# Patient Record
Sex: MALE | Race: WHITE
[De-identification: names, ages, dates, MRNs, and addresses within clinical notes are randomized per-mention and may not be internally consistent; named-entity substitution may affect disease eponyms.]

---

## 2017-03-06 ENCOUNTER — MYAURORA ACCOUNT LINK (OUTPATIENT)
Dept: OTHER | Age: 77
End: 2017-03-06

## 2017-03-06 ENCOUNTER — PRIOR ORIGINAL RECORDS (OUTPATIENT)
Dept: OTHER | Age: 77
End: 2017-03-06

## 2017-05-31 ENCOUNTER — PRIOR ORIGINAL RECORDS (OUTPATIENT)
Dept: OTHER | Age: 77
End: 2017-05-31

## 2017-06-29 ENCOUNTER — PRIOR ORIGINAL RECORDS (OUTPATIENT)
Dept: OTHER | Age: 77
End: 2017-06-29

## 2017-06-30 ENCOUNTER — LAB ENCOUNTER (OUTPATIENT)
Dept: LAB | Facility: HOSPITAL | Age: 77
End: 2017-06-30
Attending: INTERNAL MEDICINE
Payer: MEDICARE

## 2017-06-30 ENCOUNTER — PRIOR ORIGINAL RECORDS (OUTPATIENT)
Dept: OTHER | Age: 77
End: 2017-06-30

## 2017-06-30 DIAGNOSIS — E78.2 MIXED HYPERLIPIDEMIA: Primary | ICD-10-CM

## 2017-06-30 LAB
ALBUMIN SERPL BCP-MCNC: 4.2 G/DL (ref 3.5–4.8)
ALBUMIN/GLOB SERPL: 1.6 {RATIO} (ref 1–2)
ALP SERPL-CCNC: 56 U/L (ref 32–100)
ALT SERPL-CCNC: 20 U/L (ref 17–63)
ANION GAP SERPL CALC-SCNC: 8 MMOL/L (ref 0–18)
AST SERPL-CCNC: 24 U/L (ref 15–41)
BASOPHILS # BLD: 0 K/UL (ref 0–0.2)
BASOPHILS NFR BLD: 1 %
BILIRUB SERPL-MCNC: 1 MG/DL (ref 0.3–1.2)
BUN SERPL-MCNC: 35 MG/DL (ref 8–20)
BUN/CREAT SERPL: 26.5 (ref 10–20)
CALCIUM SERPL-MCNC: 9.6 MG/DL (ref 8.5–10.5)
CHLORIDE SERPL-SCNC: 107 MMOL/L (ref 95–110)
CHOLEST SERPL-MCNC: 191 MG/DL (ref 110–200)
CO2 SERPL-SCNC: 26 MMOL/L (ref 22–32)
CREAT SERPL-MCNC: 1.32 MG/DL (ref 0.5–1.5)
EOSINOPHIL # BLD: 0.1 K/UL (ref 0–0.7)
EOSINOPHIL NFR BLD: 3 %
ERYTHROCYTE [DISTWIDTH] IN BLOOD BY AUTOMATED COUNT: 13 % (ref 11–15)
GLOBULIN PLAS-MCNC: 2.7 G/DL (ref 2.5–3.7)
GLUCOSE SERPL-MCNC: 105 MG/DL (ref 70–99)
HCT VFR BLD AUTO: 41 % (ref 41–52)
HDLC SERPL-MCNC: 87 MG/DL
HGB BLD-MCNC: 14 G/DL (ref 13.5–17.5)
LDLC SERPL CALC-MCNC: 94 MG/DL (ref 0–99)
LYMPHOCYTES # BLD: 0.6 K/UL (ref 1–4)
LYMPHOCYTES NFR BLD: 16 %
MCH RBC QN AUTO: 33.3 PG (ref 27–32)
MCHC RBC AUTO-ENTMCNC: 34.1 G/DL (ref 32–37)
MCV RBC AUTO: 97.8 FL (ref 80–100)
MONOCYTES # BLD: 0.5 K/UL (ref 0–1)
MONOCYTES NFR BLD: 14 %
NEUTROPHILS # BLD AUTO: 2.5 K/UL (ref 1.8–7.7)
NEUTROPHILS NFR BLD: 66 %
NONHDLC SERPL-MCNC: 104 MG/DL
OSMOLALITY UR CALC.SUM OF ELEC: 300 MOSM/KG (ref 275–295)
PLATELET # BLD AUTO: 159 K/UL (ref 140–400)
PMV BLD AUTO: 9.2 FL (ref 7.4–10.3)
POTASSIUM SERPL-SCNC: 4.9 MMOL/L (ref 3.3–5.1)
PROT SERPL-MCNC: 6.9 G/DL (ref 5.9–8.4)
RBC # BLD AUTO: 4.2 M/UL (ref 4.5–5.9)
SODIUM SERPL-SCNC: 141 MMOL/L (ref 136–144)
TRIGL SERPL-MCNC: 52 MG/DL (ref 1–149)
WBC # BLD AUTO: 3.9 K/UL (ref 4–11)

## 2017-06-30 PROCEDURE — 85025 COMPLETE CBC W/AUTO DIFF WBC: CPT

## 2017-06-30 PROCEDURE — 80053 COMPREHEN METABOLIC PANEL: CPT

## 2017-06-30 PROCEDURE — 36415 COLL VENOUS BLD VENIPUNCTURE: CPT

## 2017-06-30 PROCEDURE — 80061 LIPID PANEL: CPT

## 2017-07-07 LAB
ALBUMIN: 4.2 G/DL
ALKALINE PHOSPHATATE(ALK PHOS): 56 IU/L
ALT (SGPT): 20 U/L
AST (SGOT): 24 U/L
BILIRUBIN TOTAL: 1 MG/DL
BUN: 35 MG/DL
CALCIUM: 9.6 MG/DL
CHLORIDE: 107 MEQ/L
CHOLESTEROL, TOTAL: 191 MG/DL
CREATININE, SERUM: 1.32 MG/DL
GLOBULIN: 2.7 G/DL
GLUCOSE: 105 MG/DL
GLUCOSE: 105 MG/DL
HDL CHOLESTEROL: 87 MG/DL
HEMATOCRIT: 41 %
HEMOGLOBIN: 14 G/DL
LDL CHOLESTEROL: 94 MG/DL
PLATELETS: 159 K/UL
POTASSIUM, SERUM: 4.9 MEQ/L
PROTEIN, TOTAL: 6.9 G/DL
RED BLOOD COUNT: 4.2 X 10-6/U
SGOT (AST): 24 IU/L
SGPT (ALT): 20 IU/L
SODIUM: 141 MEQ/L
TRIGLYCERIDES: 52 MG/DL
WHITE BLOOD COUNT: 3.9 X 10-3/U

## 2017-10-02 ENCOUNTER — OFFICE VISIT (OUTPATIENT)
Dept: OPTOMETRY | Facility: CLINIC | Age: 77
End: 2017-10-02

## 2017-10-02 DIAGNOSIS — H52.4 MYOPIA WITH PRESBYOPIA OF BOTH EYES: Primary | ICD-10-CM

## 2017-10-02 DIAGNOSIS — H52.13 MYOPIA WITH PRESBYOPIA OF BOTH EYES: Primary | ICD-10-CM

## 2017-10-02 PROCEDURE — 99211 OFF/OP EST MAY X REQ PHY/QHP: CPT | Performed by: OPTOMETRIST

## 2017-10-02 PROCEDURE — 92015 DETERMINE REFRACTIVE STATE: CPT | Performed by: OPTOMETRIST

## 2017-10-02 RX ORDER — APIXABAN 5 MG/1
5 TABLET, FILM COATED ORAL 2 TIMES DAILY
Refills: 5 | COMMUNITY
Start: 2017-09-17

## 2017-10-02 NOTE — ASSESSMENT & PLAN NOTE
New glasses RX given to update as needed. Patient will see TERRY for his annual eye exam in December.  He wants to get new glasses before his DMV test.

## 2017-10-02 NOTE — PATIENT INSTRUCTIONS
Myopia with presbyopia of both eyes  New glasses RX given to update as needed. Patient will see TERRY for his annual eye exam in December.  He wants to get new glasses before his DMV test.

## 2017-10-02 NOTE — PROGRESS NOTES
Lazaro Berger is a 69year old male. HPI:     HPI     Patient is in for a refraction only. He is due to see RJM  In  for his annual eye exam but wants to make sure he can pass his driving test and if not wants new glasses.  Patient gets tired re by mouth. Disp:  Rfl:    tamsulosin HCl (FLOMAX) 0.4 MG Oral Cap Take 0.4 mg by mouth daily. Disp:  Rfl:    aspirin (ASPIR-81) 81 MG Oral Tab EC Take 81 mg by mouth daily.    Disp:  Rfl:        Allergies:    Meperidine Hcl  [De*    Swelling  Penicillin G

## 2017-10-02 NOTE — PROGRESS NOTES
Jess Wolfe is a 69year old male. HPI:     HPI     Patient is in for a refraction only. He is due to see RJM  In  for his annual eye exam but wants to make sure he can pass his driving test and if not wants new glasses.  Patient gets tired re by mouth. Disp:  Rfl:    tamsulosin HCl (FLOMAX) 0.4 MG Oral Cap Take 0.4 mg by mouth daily. Disp:  Rfl:    aspirin (ASPIR-81) 81 MG Oral Tab EC Take 81 mg by mouth daily.    Disp:  Rfl:        Allergies:    Meperidine Hcl  [De*    Swelling  Penicillin G

## 2017-10-03 RX ORDER — HYDROCHLOROTHIAZIDE 25 MG/1
TABLET ORAL
Qty: 90 TABLET | Refills: 0 | Status: SHIPPED | OUTPATIENT
Start: 2017-10-03 | End: 2017-11-01

## 2017-10-03 RX ORDER — LISINOPRIL 10 MG/1
TABLET ORAL
Qty: 90 TABLET | Refills: 0 | Status: SHIPPED | OUTPATIENT
Start: 2017-10-03 | End: 2017-11-01 | Stop reason: DRUGHIGH

## 2017-10-03 RX ORDER — ATORVASTATIN CALCIUM 20 MG/1
TABLET, FILM COATED ORAL
Qty: 90 TABLET | Refills: 0 | Status: SHIPPED | OUTPATIENT
Start: 2017-10-03 | End: 2017-11-01

## 2017-10-31 PROBLEM — N18.30 CHRONIC KIDNEY DISEASE, STAGE 3 (HCC): Status: ACTIVE | Noted: 2017-10-31

## 2017-10-31 PROBLEM — I70.0 AORTIC ATHEROSCLEROSIS (HCC): Status: ACTIVE | Noted: 2017-10-31

## 2017-10-31 PROBLEM — I70.0 AORTIC ATHEROSCLEROSIS: Status: ACTIVE | Noted: 2017-10-31

## 2017-11-01 ENCOUNTER — OFFICE VISIT (OUTPATIENT)
Dept: INTERNAL MEDICINE CLINIC | Facility: CLINIC | Age: 77
End: 2017-11-01

## 2017-11-01 VITALS
TEMPERATURE: 99 F | SYSTOLIC BLOOD PRESSURE: 146 MMHG | HEIGHT: 69 IN | BODY MASS INDEX: 31.31 KG/M2 | DIASTOLIC BLOOD PRESSURE: 72 MMHG | WEIGHT: 211.38 LBS | HEART RATE: 96 BPM

## 2017-11-01 DIAGNOSIS — Z00.00 ENCOUNTER FOR ANNUAL HEALTH EXAMINATION: ICD-10-CM

## 2017-11-01 DIAGNOSIS — E78.00 HYPERCHOLESTEROLEMIA: ICD-10-CM

## 2017-11-01 DIAGNOSIS — N40.0 BENIGN PROSTATIC HYPERPLASIA WITHOUT LOWER URINARY TRACT SYMPTOMS: ICD-10-CM

## 2017-11-01 DIAGNOSIS — N18.30 CHRONIC KIDNEY DISEASE, STAGE 3 (HCC): ICD-10-CM

## 2017-11-01 DIAGNOSIS — I25.10 ASHD (ARTERIOSCLEROTIC HEART DISEASE): ICD-10-CM

## 2017-11-01 DIAGNOSIS — I70.0 AORTIC ATHEROSCLEROSIS (HCC): ICD-10-CM

## 2017-11-01 DIAGNOSIS — Z00.00 ANNUAL PHYSICAL EXAM: Primary | ICD-10-CM

## 2017-11-01 DIAGNOSIS — I10 ESSENTIAL HYPERTENSION: ICD-10-CM

## 2017-11-01 DIAGNOSIS — I44.1 SECOND DEGREE AV BLOCK: ICD-10-CM

## 2017-11-01 DIAGNOSIS — I48.0 PAROXYSMAL ATRIAL FIBRILLATION (HCC): ICD-10-CM

## 2017-11-01 PROBLEM — H52.4 MYOPIA WITH PRESBYOPIA OF BOTH EYES: Status: RESOLVED | Noted: 2017-10-02 | Resolved: 2017-11-01

## 2017-11-01 PROBLEM — H52.13 MYOPIA WITH PRESBYOPIA OF BOTH EYES: Status: RESOLVED | Noted: 2017-10-02 | Resolved: 2017-11-01

## 2017-11-01 PROCEDURE — G0009 ADMIN PNEUMOCOCCAL VACCINE: HCPCS | Performed by: INTERNAL MEDICINE

## 2017-11-01 PROCEDURE — 90732 PPSV23 VACC 2 YRS+ SUBQ/IM: CPT | Performed by: INTERNAL MEDICINE

## 2017-11-01 PROCEDURE — G0439 PPPS, SUBSEQ VISIT: HCPCS | Performed by: INTERNAL MEDICINE

## 2017-11-01 RX ORDER — LISINOPRIL 10 MG/1
10 TABLET ORAL
Qty: 90 TABLET | Refills: 2 | Status: CANCELLED | OUTPATIENT
Start: 2017-11-01

## 2017-11-01 RX ORDER — ATORVASTATIN CALCIUM 20 MG/1
20 TABLET, FILM COATED ORAL
Qty: 90 TABLET | Refills: 3 | Status: SHIPPED | OUTPATIENT
Start: 2017-11-01 | End: 2018-11-17

## 2017-11-01 RX ORDER — HYDROCHLOROTHIAZIDE 25 MG/1
25 TABLET ORAL
Qty: 90 TABLET | Refills: 3 | Status: SHIPPED | OUTPATIENT
Start: 2017-11-01 | End: 2018-07-16

## 2017-11-01 RX ORDER — LISINOPRIL 20 MG/1
20 TABLET ORAL DAILY
Qty: 90 TABLET | Refills: 3 | Status: SHIPPED | OUTPATIENT
Start: 2017-11-01 | End: 2018-11-17

## 2017-11-01 NOTE — PATIENT INSTRUCTIONS
Please increase lisinopril to 20 mg daily. Please continue your other medications. You received a Pneumovax today. Please get a high-dose flu shot soon, and a Zostavax soon as well.   Continue to eat healthy and exercise regularly, with hopefully some we screening covered service except at the Welcome to Medicare Visit    Abdominal aortic aneurysm screening (once between ages 73-68)  No results found for this or any previous visit.  Limited to patients who meet one of the following criteria:   • Men who are (PNEUMOVAX)    Please get once after your 65th birthday    Hepatitis B for Moderate/High Risk No orders found for this or any previous visit.  Medium/high risk factors:   End-stage renal disease   Hemophiliacs who received Factor VIII or IX concentrates   C

## 2017-11-01 NOTE — PROGRESS NOTES
HPI:   Jorge Mccullough is a 69year old male who presents this afternoon for a Medicare Subsequent Annual Wellness visit (Pt already had Initial Annual Wellness). He feels well today, and he has no specific issues for discussion.   He continues to follow Results  Component Value Date   WBC 3.9 (L) 06/30/2017   HGB 14.0 06/30/2017    06/30/2017        ALLERGIES:   He is allergic to meperidine hcl  [demerol] and penicillin g.     CURRENT MEDICATIONS:     Outpatient Prescriptions Marked as Taking for th or rectal bleeding  : Chronic urinary frequency which he attributes to fluid intake.   No dysuria or hematuria  MUSCULOSKELETAL: No leg swelling  NEURO: No headaches      EXAM:   /72   Pulse 96   Temp 98.7 °F (37.1 °C) (Oral)   Ht 5' 9\" (1.753 m) 20/40   Both Eyes Visual Acuity: Corrected Both Eyes Chart Acuity: 20/30   Able To Tolerate Visual Acuity: Yes        EXAM:   GENERAL: Pleasant male appearing well in no distress  /72   Pulse 96   Temp 98.7 °F (37.1 °C) (Oral)   Ht 5' 9\" (1.753 m) weight loss. Return visit in 1 month for blood pressure check.     ASHD (arteriosclerotic heart disease)  Asymptomatic, with regular Cardiology follow-up    Paroxysmal atrial fibrillation (HCC)  On Eliquis, with Cardiology follow-up    Second degree AV blo current health state?: Good    How do you maintain positive mental well-being?: Social Interaction; Visiting Friends; Visiting Family    If you are a male age 38-65 or a female age 47-67, do you take aspirin?: No    Have you had any immunizations at another SERVICES  INDICATIONS AND SCHEDULE Internal Lab or Procedure External Lab or Procedure   Diabetes Screening      HbgA1C   Annually GLYCOHEMOGLOBIN (HgA1c) (L) (%)   Date Value   05/07/2013 5.6       No flowsheet data found.     Fasting Blood Sugar (FSB)Neisha digoxin diuretics, anticonvulsants.)    Potassium  Annually Potassium (mmol/L)   Date Value   06/30/2017 4.9     POTASSIUM (P) (mmol/L)   Date Value   09/11/2015 4.5    No flowsheet data found.     Creatinine  Annually Creatinine (mg/dL)   Date Value   06/3

## 2017-11-05 ENCOUNTER — LAB ENCOUNTER (OUTPATIENT)
Dept: LAB | Facility: HOSPITAL | Age: 77
End: 2017-11-05
Attending: UROLOGY
Payer: MEDICARE

## 2017-11-05 DIAGNOSIS — R97.20 ELEVATED PROSTATE SPECIFIC ANTIGEN (PSA): Primary | ICD-10-CM

## 2017-11-05 PROCEDURE — 84153 ASSAY OF PSA TOTAL: CPT

## 2017-11-05 PROCEDURE — 36415 COLL VENOUS BLD VENIPUNCTURE: CPT

## 2017-12-01 ENCOUNTER — OFFICE VISIT (OUTPATIENT)
Dept: INTERNAL MEDICINE CLINIC | Facility: CLINIC | Age: 77
End: 2017-12-01

## 2017-12-01 VITALS
DIASTOLIC BLOOD PRESSURE: 58 MMHG | BODY MASS INDEX: 31.7 KG/M2 | HEIGHT: 69 IN | SYSTOLIC BLOOD PRESSURE: 138 MMHG | HEART RATE: 66 BPM | WEIGHT: 214 LBS

## 2017-12-01 DIAGNOSIS — I10 ESSENTIAL HYPERTENSION: Primary | ICD-10-CM

## 2017-12-01 PROCEDURE — G0463 HOSPITAL OUTPT CLINIC VISIT: HCPCS | Performed by: INTERNAL MEDICINE

## 2017-12-01 PROCEDURE — 99213 OFFICE O/P EST LOW 20 MIN: CPT | Performed by: INTERNAL MEDICINE

## 2017-12-06 ENCOUNTER — OFFICE VISIT (OUTPATIENT)
Dept: OPHTHALMOLOGY | Facility: CLINIC | Age: 77
End: 2017-12-06

## 2017-12-06 DIAGNOSIS — H40.003 GLAUCOMA SUSPECT, BILATERAL: Primary | ICD-10-CM

## 2017-12-06 DIAGNOSIS — H25.13 AGE-RELATED NUCLEAR CATARACT OF BOTH EYES: ICD-10-CM

## 2017-12-06 DIAGNOSIS — H43.393 VITREOUS FLOATERS OF BOTH EYES: ICD-10-CM

## 2017-12-06 PROCEDURE — 92014 COMPRE OPH EXAM EST PT 1/>: CPT | Performed by: OPHTHALMOLOGY

## 2017-12-06 PROCEDURE — 92250 FUNDUS PHOTOGRAPHY W/I&R: CPT | Performed by: OPHTHALMOLOGY

## 2017-12-06 NOTE — ASSESSMENT & PLAN NOTE
Discussed with patient that he is a glaucoma suspect based on increased cupping of the optic nerves in both eyes. Retinal photos taken today to document optic nerves. Glaucoma diagnostic testing ordered.   Will not start medication, but will continue to o

## 2017-12-06 NOTE — ASSESSMENT & PLAN NOTE
Discussed early cataracts with patient. No treatment recommended at this time. Continue same glasses.

## 2017-12-06 NOTE — PROGRESS NOTES
Lazaro Berger is a 69year old male. HPI:     HPI     Patient is here for an eye exam and photos. Patient saw Dr. Bernarda Quesada on 10/2/17 and got new glasses.   He is happy with his vision from the new glasses, but notes that it takes longer to focus at [facility] by mouth daily. Disp: 90 tablet Rfl: 3   ELIQUIS 5 MG Oral Tab 5 mg 2 (two) times daily. Disp:  Rfl: 5   finasteride 5 MG Oral Tab Take 5 mg by mouth daily. Disp:  Rfl:    Vitamin D3 (VITAMIN D3) 1000 UNITS Oral Tab Take  by mouth.  Disp:  Rfl:    sher glaucoma suspect based on increased cupping of the optic nerves in both eyes. Retinal photos taken today to document optic nerves. Glaucoma diagnostic testing ordered. Will not start medication, but will continue to observe.   Patient verbalized Prince Callahan

## 2017-12-06 NOTE — PATIENT INSTRUCTIONS
Glaucoma suspect, bilateral  Discussed with patient that he is a glaucoma suspect based on increased cupping of the optic nerves in both eyes. Retinal photos taken today to document optic nerves. Glaucoma diagnostic testing ordered.   Will not start medic

## 2017-12-19 ENCOUNTER — PRIOR ORIGINAL RECORDS (OUTPATIENT)
Dept: OTHER | Age: 77
End: 2017-12-19

## 2017-12-19 ENCOUNTER — LAB ENCOUNTER (OUTPATIENT)
Dept: LAB | Facility: HOSPITAL | Age: 77
End: 2017-12-19
Attending: INTERNAL MEDICINE
Payer: MEDICARE

## 2017-12-19 DIAGNOSIS — I10 ESSENTIAL HYPERTENSION, MALIGNANT: Primary | ICD-10-CM

## 2017-12-19 PROCEDURE — 80048 BASIC METABOLIC PNL TOTAL CA: CPT

## 2017-12-19 PROCEDURE — 85025 COMPLETE CBC W/AUTO DIFF WBC: CPT

## 2017-12-19 PROCEDURE — 36415 COLL VENOUS BLD VENIPUNCTURE: CPT

## 2017-12-20 ENCOUNTER — OFFICE VISIT (OUTPATIENT)
Dept: OPHTHALMOLOGY | Facility: CLINIC | Age: 77
End: 2017-12-20

## 2017-12-20 DIAGNOSIS — H40.003 GLAUCOMA SUSPECT, BILATERAL: ICD-10-CM

## 2017-12-20 LAB
BUN: 32 MG/DL
CALCIUM: 9.9 MG/DL
CHLORIDE: 105 MEQ/L
CREATININE, SERUM: 1.3 MG/DL
GLUCOSE: 90 MG/DL
HEMATOCRIT: 41.8 %
HEMOGLOBIN: 14.1 G/DL
PLATELETS: 163 K/UL
POTASSIUM, SERUM: 4.6 MEQ/L
RED BLOOD COUNT: 4.25 X 10-6/U
SODIUM: 143 MEQ/L
WHITE BLOOD COUNT: 3.9 X 10-3/U

## 2017-12-20 PROCEDURE — 92133 CPTRZD OPH DX IMG PST SGM ON: CPT | Performed by: OPHTHALMOLOGY

## 2017-12-20 PROCEDURE — 92083 EXTENDED VISUAL FIELD XM: CPT | Performed by: OPHTHALMOLOGY

## 2017-12-20 RX ORDER — LATANOPROST 50 UG/ML
1 SOLUTION/ DROPS OPHTHALMIC NIGHTLY
Qty: 1 BOTTLE | Refills: 11 | Status: SHIPPED | OUTPATIENT
Start: 2017-12-20 | End: 2018-11-12

## 2017-12-20 NOTE — PROGRESS NOTES
Donna Ibarra is a 69year old male.     HPI:     HPI     Patient is here for a VF and OCT with no MD.      Last edited by Agnieszka Fisher on 12/20/2017 11:08 AM. (History)        Patient History:  Past Medical History:   Diagnosis Date   • Aortic atherosc UNITS Oral Tab Take  by mouth. Disp:  Rfl:    tamsulosin HCl (FLOMAX) 0.4 MG Oral Cap Take 0.4 mg by mouth daily.    Disp:  Rfl:        Allergies:    Meperidine Hcl  [De*    Swelling  Penicillin G            Rash    ROS:       PHYSICAL EXAM:      Not record

## 2017-12-20 NOTE — TELEPHONE ENCOUNTER
KK spoke to pt to go over test results. Information mailed to pt. Pt will start Latanoprost OS qhs and return on April 10 for IOP check.

## 2017-12-20 NOTE — TELEPHONE ENCOUNTER
LM for pt to call me back to go over new Dx of glaucoma OS. Pt will start Latanoprost OS qhs and return in April 2018 for IOP check. Waiting for call back. Latanoprost drops sent to TERRY to sign off on.

## 2018-01-03 ENCOUNTER — MYAURORA ACCOUNT LINK (OUTPATIENT)
Dept: OTHER | Age: 78
End: 2018-01-03

## 2018-03-12 ENCOUNTER — MYAURORA ACCOUNT LINK (OUTPATIENT)
Dept: OTHER | Age: 78
End: 2018-03-12

## 2018-03-12 ENCOUNTER — PRIOR ORIGINAL RECORDS (OUTPATIENT)
Dept: OTHER | Age: 78
End: 2018-03-12

## 2018-04-10 ENCOUNTER — OFFICE VISIT (OUTPATIENT)
Dept: OPHTHALMOLOGY | Facility: CLINIC | Age: 78
End: 2018-04-10

## 2018-04-10 DIAGNOSIS — H40.1121 PRIMARY OPEN-ANGLE GLAUCOMA, LEFT EYE, MILD STAGE: Primary | ICD-10-CM

## 2018-04-10 PROCEDURE — 99213 OFFICE O/P EST LOW 20 MIN: CPT | Performed by: OPHTHALMOLOGY

## 2018-04-10 PROCEDURE — G0463 HOSPITAL OUTPT CLINIC VISIT: HCPCS | Performed by: OPHTHALMOLOGY

## 2018-04-10 NOTE — ASSESSMENT & PLAN NOTE
IOP is improved. Continue Latanoprost at bedtime in the left eye only. Will have patient back in 4 months for a pressure check.

## 2018-04-10 NOTE — PROGRESS NOTES
Socorro Camacho is a 69year old male. HPI:     HPI     Pt is here for an IOP check since he started Latanoprost qhs OS in 12/2017. Pt has been very compliant with his eye drops. Patient denies any side effects from the drops.      Last edited by Caroline Lynn, hydrochlorothiazide 25 MG Oral Tab Take 1 tablet (25 mg total) by mouth once daily. Disp: 90 tablet Rfl: 3   lisinopril 20 MG Oral Tab Take 1 tablet (20 mg total) by mouth daily. Disp: 90 tablet Rfl: 3   ELIQUIS 5 MG Oral Tab 5 mg 2 (two) times daily. No orders of the defined types were placed in this encounter. Meds This Visit:    No prescriptions requested or ordered in this encounter     Follow up instructions:  Return in about 4 months (around 8/10/2018) for IOP check.     4/10/2018  Scr

## 2018-06-26 ENCOUNTER — PRIOR ORIGINAL RECORDS (OUTPATIENT)
Dept: OTHER | Age: 78
End: 2018-06-26

## 2018-07-12 ENCOUNTER — LAB ENCOUNTER (OUTPATIENT)
Dept: LAB | Facility: HOSPITAL | Age: 78
End: 2018-07-12
Attending: INTERNAL MEDICINE
Payer: MEDICARE

## 2018-07-12 ENCOUNTER — PRIOR ORIGINAL RECORDS (OUTPATIENT)
Dept: OTHER | Age: 78
End: 2018-07-12

## 2018-07-12 DIAGNOSIS — I10 HYPERTENSION: ICD-10-CM

## 2018-07-12 DIAGNOSIS — E78.2 MIXED HYPERLIPIDEMIA: Primary | ICD-10-CM

## 2018-07-12 LAB
ALBUMIN SERPL BCP-MCNC: 4 G/DL (ref 3.5–4.8)
ALBUMIN/GLOB SERPL: 1.4 {RATIO} (ref 1–2)
ALP SERPL-CCNC: 53 U/L (ref 32–100)
ALT SERPL-CCNC: 19 U/L (ref 17–63)
ANION GAP SERPL CALC-SCNC: 9 MMOL/L (ref 0–18)
AST SERPL-CCNC: 23 U/L (ref 15–41)
BASOPHILS # BLD: 0 K/UL (ref 0–0.2)
BASOPHILS NFR BLD: 1 %
BILIRUB SERPL-MCNC: 1.1 MG/DL (ref 0.3–1.2)
BUN SERPL-MCNC: 38 MG/DL (ref 8–20)
BUN/CREAT SERPL: 23.9 (ref 10–20)
CALCIUM SERPL-MCNC: 9.7 MG/DL (ref 8.5–10.5)
CHLORIDE SERPL-SCNC: 106 MMOL/L (ref 95–110)
CHOLEST SERPL-MCNC: 166 MG/DL (ref 110–200)
CO2 SERPL-SCNC: 25 MMOL/L (ref 22–32)
CREAT SERPL-MCNC: 1.59 MG/DL (ref 0.5–1.5)
EOSINOPHIL # BLD: 0.2 K/UL (ref 0–0.7)
EOSINOPHIL NFR BLD: 5 %
ERYTHROCYTE [DISTWIDTH] IN BLOOD BY AUTOMATED COUNT: 12.3 % (ref 11–15)
GLOBULIN PLAS-MCNC: 2.8 G/DL (ref 2.5–3.7)
GLUCOSE SERPL-MCNC: 116 MG/DL (ref 70–99)
HCT VFR BLD AUTO: 41.6 % (ref 41–52)
HDLC SERPL-MCNC: 75 MG/DL
HGB BLD-MCNC: 13.9 G/DL (ref 13.5–17.5)
LDLC SERPL CALC-MCNC: 77 MG/DL (ref 0–99)
LYMPHOCYTES # BLD: 1 K/UL (ref 1–4)
LYMPHOCYTES NFR BLD: 26 %
MCH RBC QN AUTO: 33 PG (ref 27–32)
MCHC RBC AUTO-ENTMCNC: 33.5 G/DL (ref 32–37)
MCV RBC AUTO: 98.4 FL (ref 80–100)
MONOCYTES # BLD: 0.6 K/UL (ref 0–1)
MONOCYTES NFR BLD: 16 %
NEUTROPHILS # BLD AUTO: 2 K/UL (ref 1.8–7.7)
NEUTROPHILS NFR BLD: 53 %
NONHDLC SERPL-MCNC: 91 MG/DL
OSMOLALITY UR CALC.SUM OF ELEC: 300 MOSM/KG (ref 275–295)
PATIENT FASTING: YES
PLATELET # BLD AUTO: 176 K/UL (ref 140–400)
PMV BLD AUTO: 9.6 FL (ref 7.4–10.3)
POTASSIUM SERPL-SCNC: 5.4 MMOL/L (ref 3.3–5.1)
PROT SERPL-MCNC: 6.8 G/DL (ref 5.9–8.4)
RBC # BLD AUTO: 4.23 M/UL (ref 4.5–5.9)
SODIUM SERPL-SCNC: 140 MMOL/L (ref 136–144)
TRIGL SERPL-MCNC: 69 MG/DL (ref 1–149)
WBC # BLD AUTO: 3.8 K/UL (ref 4–11)

## 2018-07-12 PROCEDURE — 85025 COMPLETE CBC W/AUTO DIFF WBC: CPT

## 2018-07-12 PROCEDURE — 80053 COMPREHEN METABOLIC PANEL: CPT

## 2018-07-12 PROCEDURE — 80061 LIPID PANEL: CPT

## 2018-07-12 PROCEDURE — 36415 COLL VENOUS BLD VENIPUNCTURE: CPT

## 2018-07-13 ENCOUNTER — TELEPHONE (OUTPATIENT)
Dept: INTERNAL MEDICINE CLINIC | Facility: CLINIC | Age: 78
End: 2018-07-13

## 2018-07-13 ENCOUNTER — PRIOR ORIGINAL RECORDS (OUTPATIENT)
Dept: OTHER | Age: 78
End: 2018-07-13

## 2018-07-13 LAB
ALBUMIN: 4 G/DL
ALKALINE PHOSPHATATE(ALK PHOS): 53 IU/L
ALT (SGPT): 19 U/L
AST (SGOT): 23 U/L
BILIRUBIN TOTAL: 1.1 MG/DL
BUN: 38 MG/DL
CALCIUM: 9.7 MG/DL
CHLORIDE: 106 MEQ/L
CHOLESTEROL, TOTAL: 166 MG/DL
CREATININE, SERUM: 1.59 MG/DL
GLOBULIN: 2.8 G/DL
GLUCOSE: 116 MG/DL
GLUCOSE: 116 MG/DL
HDL CHOLESTEROL: 75 MG/DL
HEMATOCRIT: 41.6 %
HEMOGLOBIN: 13.9 G/DL
LDL CHOLESTEROL: 77 MG/DL
PLATELETS: 176 K/UL
POTASSIUM, SERUM: 5.4 MEQ/L
PROTEIN, TOTAL: 6.8 G/DL
RED BLOOD COUNT: 4.23 X 10-6/U
SGOT (AST): 23 IU/L
SGPT (ALT): 19 IU/L
SODIUM: 140 MEQ/L
TRIGLYCERIDES: 69 MG/DL
WHITE BLOOD COUNT: 3.8 X 10-3/U

## 2018-07-13 NOTE — TELEPHONE ENCOUNTER
Results noted. Please call patient. He should schedule an appointment with me as I have not seen him since December.

## 2018-07-13 NOTE — TELEPHONE ENCOUNTER
Landy/Dr Boyer's office/Cardiology said pt had abnormal lab results  Requesting Dr Dilip Snyder check results in [address] epic

## 2018-07-14 NOTE — TELEPHONE ENCOUNTER
Appointment scheduled for Monday 7/16/18 1pm, Dr Mario SPENCER, patient did ask if he should continue his medications, not on potassium, did advise not to stop unless contacted by doctor to do so, verbalized understanding.     Advised patient of Dr Robert Bansal

## 2018-07-14 NOTE — TELEPHONE ENCOUNTER
Recommend he continue current medications for now.   Will address medications further at his upcoming visit

## 2018-07-16 ENCOUNTER — OFFICE VISIT (OUTPATIENT)
Dept: INTERNAL MEDICINE CLINIC | Facility: CLINIC | Age: 78
End: 2018-07-16

## 2018-07-16 VITALS
BODY MASS INDEX: 32.29 KG/M2 | DIASTOLIC BLOOD PRESSURE: 56 MMHG | HEIGHT: 69 IN | TEMPERATURE: 98 F | WEIGHT: 218 LBS | SYSTOLIC BLOOD PRESSURE: 124 MMHG | HEART RATE: 71 BPM

## 2018-07-16 DIAGNOSIS — N18.30 CHRONIC KIDNEY DISEASE, STAGE 3 (HCC): Primary | ICD-10-CM

## 2018-07-16 PROCEDURE — G0463 HOSPITAL OUTPT CLINIC VISIT: HCPCS | Performed by: INTERNAL MEDICINE

## 2018-07-16 PROCEDURE — 99213 OFFICE O/P EST LOW 20 MIN: CPT | Performed by: INTERNAL MEDICINE

## 2018-07-16 NOTE — PROGRESS NOTES
Devorah Ambrose is a 69year old male. Patient presents with: Follow - Up  Lab Results: 7/12 [facility]    HPI:   Mr. Norma Dickerson presents this afternoon to discuss recent abnormal test results. Earlier this month he had blood tests ordered routinely by Dr. Megan Roberts. disease, stage 3 ([facility] [address]    • Essential hypertension    • Glaucoma 12/20/2017    Started Latanoprost OS qhs due to OCT thinning OS   • Hypercholesterolemia    • Paroxysmal atrial fibrillation (HCC)    • Second degree AV block     Status post pacemaker placem

## 2018-07-16 NOTE — PATIENT INSTRUCTIONS
Please stop hydrochlorothiazide. Schedule a kidney ultrasound. Repeat blood tests in 2 weeks. Return visit in 1 month.

## 2018-07-17 ENCOUNTER — MYAURORA ACCOUNT LINK (OUTPATIENT)
Dept: OTHER | Age: 78
End: 2018-07-17

## 2018-07-18 ENCOUNTER — MYAURORA ACCOUNT LINK (OUTPATIENT)
Dept: OTHER | Age: 78
End: 2018-07-18

## 2018-07-27 ENCOUNTER — HOSPITAL ENCOUNTER (OUTPATIENT)
Dept: ULTRASOUND IMAGING | Facility: HOSPITAL | Age: 78
Discharge: HOME | End: 2018-07-27
Attending: INTERNAL MEDICINE
Payer: MEDICARE

## 2018-07-27 ENCOUNTER — PRIOR ORIGINAL RECORDS (OUTPATIENT)
Dept: OTHER | Age: 78
End: 2018-07-27

## 2018-07-27 DIAGNOSIS — N18.30 CHRONIC KIDNEY DISEASE, STAGE 3 (HCC): ICD-10-CM

## 2018-07-27 PROCEDURE — 76775 US EXAM ABDO BACK WALL LIM: CPT | Performed by: INTERNAL MEDICINE

## 2018-07-30 ENCOUNTER — OFFICE VISIT (OUTPATIENT)
Dept: INTERNAL MEDICINE CLINIC | Facility: CLINIC | Age: 78
End: 2018-07-30
Payer: MEDICARE

## 2018-07-30 ENCOUNTER — APPOINTMENT (OUTPATIENT)
Dept: LAB | Facility: HOSPITAL | Age: 78
End: 2018-07-30
Attending: INTERNAL MEDICINE
Payer: MEDICARE

## 2018-07-30 VITALS
WEIGHT: 221 LBS | RESPIRATION RATE: 18 BRPM | HEART RATE: 70 BPM | HEIGHT: 69 IN | BODY MASS INDEX: 32.73 KG/M2 | SYSTOLIC BLOOD PRESSURE: 132 MMHG | DIASTOLIC BLOOD PRESSURE: 62 MMHG

## 2018-07-30 DIAGNOSIS — I10 ESSENTIAL HYPERTENSION: Primary | ICD-10-CM

## 2018-07-30 DIAGNOSIS — N18.30 CHRONIC KIDNEY DISEASE, STAGE 3 (HCC): ICD-10-CM

## 2018-07-30 LAB
ANION GAP SERPL CALC-SCNC: 6 MMOL/L (ref 0–18)
BUN SERPL-MCNC: 34 MG/DL (ref 8–20)
BUN/CREAT SERPL: 22.4 (ref 10–20)
CALCIUM SERPL-MCNC: 9.5 MG/DL (ref 8.5–10.5)
CHLORIDE SERPL-SCNC: 107 MMOL/L (ref 95–110)
CO2 SERPL-SCNC: 26 MMOL/L (ref 22–32)
CREAT SERPL-MCNC: 1.52 MG/DL (ref 0.5–1.5)
GLUCOSE SERPL-MCNC: 105 MG/DL (ref 70–99)
OSMOLALITY UR CALC.SUM OF ELEC: 296 MOSM/KG (ref 275–295)
POTASSIUM SERPL-SCNC: 5 MMOL/L (ref 3.3–5.1)
SODIUM SERPL-SCNC: 139 MMOL/L (ref 136–144)

## 2018-07-30 PROCEDURE — 80048 BASIC METABOLIC PNL TOTAL CA: CPT

## 2018-07-30 PROCEDURE — G0463 HOSPITAL OUTPT CLINIC VISIT: HCPCS | Performed by: INTERNAL MEDICINE

## 2018-07-30 PROCEDURE — 99213 OFFICE O/P EST LOW 20 MIN: CPT | Performed by: INTERNAL MEDICINE

## 2018-07-30 PROCEDURE — 36415 COLL VENOUS BLD VENIPUNCTURE: CPT

## 2018-07-30 NOTE — PATIENT INSTRUCTIONS
Await results of today's blood tests. Continue current medication, remaining off HCTZ. Return visit in 3 months.

## 2018-07-30 NOTE — PROGRESS NOTES
Fidel Judge is a 69year old male. Patient presents with:  Hypertension    HPI:   Mr. Garfield Bo presents this afternoon for follow-up. At his visit 2 weeks ago, because of worsening renal function, HCTZ was stopped.   Subsequent renal ultrasound normal exc Status post pacemaker placement 5-14     Past Surgical History:  1968: APPENDECTOMY  May 2014: CARDIAC PACEMAKER PLACEMENT  Childhood: TONSILLECTOMY   Social History:  Smoking status: Former Smoker

## 2018-08-21 ENCOUNTER — OFFICE VISIT (OUTPATIENT)
Dept: OPHTHALMOLOGY | Facility: CLINIC | Age: 78
End: 2018-08-21
Payer: MEDICARE

## 2018-08-21 DIAGNOSIS — H40.1121 PRIMARY OPEN-ANGLE GLAUCOMA, LEFT EYE, MILD STAGE: Primary | ICD-10-CM

## 2018-08-21 PROCEDURE — 99213 OFFICE O/P EST LOW 20 MIN: CPT | Performed by: OPHTHALMOLOGY

## 2018-08-21 PROCEDURE — G0463 HOSPITAL OUTPT CLINIC VISIT: HCPCS | Performed by: OPHTHALMOLOGY

## 2018-08-21 NOTE — ASSESSMENT & PLAN NOTE
IOP is improved. Continue Latanoprost at bedtime in the left eye only. Will have patient back in 4 months for a visual field, OCT and dilation with refraction. If there is no improvement with refraction, to consider right cataract surgery.

## 2018-08-21 NOTE — PATIENT INSTRUCTIONS
Primary open-angle glaucoma, left eye, mild stage  IOP is improved. Continue Latanoprost at bedtime in the left eye only. Will have patient back in 4 months for a visual field, OCT and dilation with refraction.     If there is no improvement with refracti

## 2018-08-21 NOTE — PROGRESS NOTES
Summer Davis is a 69year old male. HPI:     HPI     Here for an IOP check. Pt has been using Latanoprost OS qhs as directed. Pt complains of blurred vision OD at distance and near over the past few months.      Last edited by Genaro Perry O.T. 0.005 % Ophthalmic Solution Place 1 drop into the left eye nightly. Instill 1 drop by ophthalmic route every night into left eye Disp: 1 Bottle Rfl: 11   atorvastatin 20 MG Oral Tab Take 1 tablet (20 mg total) by mouth once daily.  Disp: 90 tablet Rfl: 3 mild stage  IOP is improved. Continue Latanoprost at bedtime in the left eye only. Will have patient back in 4 months for a visual field, OCT and dilation with refraction. If there is no improvement with refraction, to consider right cataract surgery.

## 2018-09-26 ENCOUNTER — PATIENT MESSAGE (OUTPATIENT)
Dept: INTERNAL MEDICINE CLINIC | Facility: CLINIC | Age: 78
End: 2018-09-26

## 2018-09-26 ENCOUNTER — TELEPHONE (OUTPATIENT)
Dept: OTHER | Age: 78
End: 2018-09-26

## 2018-09-27 NOTE — TELEPHONE ENCOUNTER
From: Rachel Gardner  To: Viktoriya Cancino MD  Sent: 9/26/2018 5:43 PM CDT  Subject: Visit Follow-up Question    Hi, Dr. Zeynep Varghese:  Several weeks ago, after an office consult related to my c/o fatigue and test numbers related to my Stage 3 Kidney Disease, you

## 2018-09-27 NOTE — TELEPHONE ENCOUNTER
----- Message -----     From: Donna Ibarra     Sent: 9/26/2018  5:43 PM CDT       To: Venus Judge MD  Subject: Visit Follow-up Question    Hi, Dr. Otis Mixon:  Several weeks ago, after an office consult related to my c/o fatigue and test numbers related

## 2018-09-27 NOTE — TELEPHONE ENCOUNTER
appt made for Friday 9/28/18. Patient called back. He confirmed his symptoms. No edema when laying down, however starting noticing lower extremity edema later in the day. Denied chest pains. Has some shortness of breath with increase activity.  No sob at

## 2018-09-28 ENCOUNTER — APPOINTMENT (OUTPATIENT)
Dept: LAB | Facility: HOSPITAL | Age: 78
End: 2018-09-28
Attending: INTERNAL MEDICINE
Payer: MEDICARE

## 2018-09-28 ENCOUNTER — OFFICE VISIT (OUTPATIENT)
Dept: INTERNAL MEDICINE CLINIC | Facility: CLINIC | Age: 78
End: 2018-09-28
Payer: MEDICARE

## 2018-09-28 VITALS
HEART RATE: 70 BPM | SYSTOLIC BLOOD PRESSURE: 136 MMHG | BODY MASS INDEX: 33.33 KG/M2 | DIASTOLIC BLOOD PRESSURE: 62 MMHG | HEIGHT: 69 IN | WEIGHT: 225 LBS

## 2018-09-28 DIAGNOSIS — R60.9 EDEMA, UNSPECIFIED TYPE: ICD-10-CM

## 2018-09-28 DIAGNOSIS — I10 ESSENTIAL HYPERTENSION: ICD-10-CM

## 2018-09-28 DIAGNOSIS — R60.9 EDEMA, UNSPECIFIED TYPE: Primary | ICD-10-CM

## 2018-09-28 PROCEDURE — G0463 HOSPITAL OUTPT CLINIC VISIT: HCPCS | Performed by: INTERNAL MEDICINE

## 2018-09-28 PROCEDURE — 80048 BASIC METABOLIC PNL TOTAL CA: CPT

## 2018-09-28 PROCEDURE — 36415 COLL VENOUS BLD VENIPUNCTURE: CPT

## 2018-09-28 PROCEDURE — 99213 OFFICE O/P EST LOW 20 MIN: CPT | Performed by: INTERNAL MEDICINE

## 2018-09-28 PROCEDURE — 84443 ASSAY THYROID STIM HORMONE: CPT

## 2018-09-28 NOTE — PROGRESS NOTES
Jess Wolfe is a 69year old male. Patient presents with:  Leg Swelling: C/o bilateral leg swelling but more swelling in the right leg for about a week  Hypertension: Stts he's noticed his BP has been elevated.   BP around 140s/80s    HPI:   Dr. Dede thacker G            RASH   Past Medical History:   Diagnosis Date   • Aortic atherosclerosis (HCC)     CXR 5-14   • ASHD (arteriosclerotic heart disease)     Cardiac CT angiography 9-15 with 40-60% mid LAD stenosis   • BPH (benign prostatic hyperplasia)    •  may again worsen. Continue current medications for now. Check BMP and TSH with reflex T4 today. Order sent. Return visit in 1 month for recheck. - BASIC METABOLIC PANEL (8); Future  - TSH W REFLEX TO FREE T4; Future    2.  Essential hypertension  Adequ

## 2018-09-28 NOTE — PATIENT INSTRUCTIONS
Please try to limit dietary salt and sodium. Elevate your legs when seated and lying down. Continue current medications. Await results of today's blood tests. Return visit in 1 month.

## 2018-10-30 ENCOUNTER — MYAURORA ACCOUNT LINK (OUTPATIENT)
Dept: OTHER | Age: 78
End: 2018-10-30

## 2018-10-31 ENCOUNTER — OFFICE VISIT (OUTPATIENT)
Dept: INTERNAL MEDICINE CLINIC | Facility: CLINIC | Age: 78
End: 2018-10-31
Payer: MEDICARE

## 2018-10-31 VITALS
DIASTOLIC BLOOD PRESSURE: 62 MMHG | WEIGHT: 225 LBS | HEART RATE: 70 BPM | SYSTOLIC BLOOD PRESSURE: 132 MMHG | BODY MASS INDEX: 33.33 KG/M2 | HEIGHT: 69 IN

## 2018-10-31 DIAGNOSIS — R60.9 EDEMA, UNSPECIFIED TYPE: Primary | ICD-10-CM

## 2018-10-31 DIAGNOSIS — I10 ESSENTIAL HYPERTENSION: ICD-10-CM

## 2018-10-31 PROCEDURE — 99213 OFFICE O/P EST LOW 20 MIN: CPT | Performed by: INTERNAL MEDICINE

## 2018-10-31 PROCEDURE — G0463 HOSPITAL OUTPT CLINIC VISIT: HCPCS | Performed by: INTERNAL MEDICINE

## 2018-10-31 NOTE — PATIENT INSTRUCTIONS
Please continue to limit dietary sodium and elevate your legs while seated. Continue current medications. Return visit in 3 months.

## 2018-10-31 NOTE — PROGRESS NOTES
Zahida Carrera is a 69year old male. Patient presents with:  Edema: pt is here to follow up with PCP regarding swelling and test results    HPI:   Dr. Karol Poe presents this morning for follow-up of edema.       At his visit 1 month ago, renal function had i thinning OS   • Hypercholesterolemia    • Left ventricular hypertrophy     Echo 7-18 with mild LVH, normal LV function, EF 55-60%, mild MR, severe LAE, mild TR with peak pulmonary artery pressure 38 mmHg   • Paroxysmal atrial fibrillation (HCC)    • Pulmon

## 2018-11-12 RX ORDER — LATANOPROST 50 UG/ML
SOLUTION/ DROPS OPHTHALMIC
Qty: 3 BOTTLE | Refills: 3 | Status: SHIPPED | OUTPATIENT
Start: 2018-11-12 | End: 2018-12-13

## 2018-11-18 RX ORDER — ATORVASTATIN CALCIUM 20 MG/1
TABLET, FILM COATED ORAL
Qty: 90 TABLET | Refills: 0 | Status: SHIPPED | OUTPATIENT
Start: 2018-11-18 | End: 2019-02-19

## 2018-11-18 RX ORDER — LISINOPRIL 20 MG/1
TABLET ORAL
Qty: 90 TABLET | Refills: 0 | Status: SHIPPED | OUTPATIENT
Start: 2018-11-18 | End: 2018-11-20

## 2018-11-20 RX ORDER — LISINOPRIL 20 MG/1
TABLET ORAL
Qty: 90 TABLET | Refills: 1 | Status: SHIPPED | OUTPATIENT
Start: 2018-11-20 | End: 2019-07-30

## 2018-12-13 ENCOUNTER — OFFICE VISIT (OUTPATIENT)
Dept: OPHTHALMOLOGY | Facility: CLINIC | Age: 78
End: 2018-12-13
Payer: MEDICARE

## 2018-12-13 DIAGNOSIS — H40.1111 PRIMARY OPEN ANGLE GLAUCOMA (POAG) OF RIGHT EYE, MILD STAGE: ICD-10-CM

## 2018-12-13 DIAGNOSIS — H40.1121 PRIMARY OPEN-ANGLE GLAUCOMA, LEFT EYE, MILD STAGE: Primary | ICD-10-CM

## 2018-12-13 DIAGNOSIS — H43.393 VITREOUS FLOATERS OF BOTH EYES: ICD-10-CM

## 2018-12-13 DIAGNOSIS — H25.13 AGE-RELATED NUCLEAR CATARACT OF BOTH EYES: ICD-10-CM

## 2018-12-13 PROCEDURE — 92083 EXTENDED VISUAL FIELD XM: CPT | Performed by: OPHTHALMOLOGY

## 2018-12-13 PROCEDURE — 92014 COMPRE OPH EXAM EST PT 1/>: CPT | Performed by: OPHTHALMOLOGY

## 2018-12-13 PROCEDURE — 92133 CPTRZD OPH DX IMG PST SGM ON: CPT | Performed by: OPHTHALMOLOGY

## 2018-12-13 PROCEDURE — 92015 DETERMINE REFRACTIVE STATE: CPT | Performed by: OPHTHALMOLOGY

## 2018-12-13 RX ORDER — LATANOPROST 50 UG/ML
SOLUTION/ DROPS OPHTHALMIC
Qty: 3 BOTTLE | Refills: 3 | Status: SHIPPED | OUTPATIENT
Start: 2018-12-13 | End: 2019-09-09

## 2018-12-13 NOTE — ASSESSMENT & PLAN NOTE
Visual field in the right eye is normal, but the OCT shows some thinning of retinal nerve fiber in the right eye. New diagnosis of glaucoma in the right eye based on thinning of the optic nerve superiorly on OCT.     START Latanoprost at bedtime in the rig

## 2018-12-13 NOTE — PATIENT INSTRUCTIONS
Primary open-angle glaucoma, left eye, mild stage  Visual field and OCT are both stable in the left eye. IOP is stable. Continue Latanoprost at bedtime in the left eye, but add Latanoprost at bedtime to the right eye.   Therefore, will be using Latanopro

## 2018-12-13 NOTE — ASSESSMENT & PLAN NOTE
Visual field and OCT are both stable in the left eye. IOP is stable. Continue Latanoprost at bedtime in the left eye, but add Latanoprost at bedtime to the right eye. Therefore, will be using Latanoprost at bedtime in both eyes.

## 2018-12-13 NOTE — PROGRESS NOTES
Kristine Tiwari is a 69year old male. HPI:     HPI     Here for a VF, OCT and EE with refraction. Pt has been using Latanoprost OS qhs as directed.  Pt complains of blurred vision at distance and near with his glasses and would like an updated glasses R Alcohol use:  Yes      Alcohol/week: 0.0 oz      Comment: 2 glasses of wine most days    Drug use: No      Medications:    Current Outpatient Medications:  latanoprost 0.005 % Ophthalmic Solution INSTILL 1 DROP INTO BOTH EYES NIGHTLY Disp: 3 Bottle Rfl: 3 Cooley ring Vitreous floaters          Fundus Exam       Right Left    Disc Sloping margin, Temporal crescent Sloping margin, Temporal crescent    C/D Ratio 0.7 0.6    Macula Normal Normal    Vessels Normal Normal    Periphery Normal Normal            Refra Latanoprost at bedtime in the right eye. Therefore, will be on Latanoprost at bedtime in both eyes. No orders of the defined types were placed in this encounter.       Meds This Visit:  Requested Prescriptions     Signed Prescriptions Disp Refills

## 2018-12-13 NOTE — ASSESSMENT & PLAN NOTE
Discussed cataracts in detail with patient. Discussed that cataracts are advanced enough to consider cataract surgery, but it would be patient's choice.      Options:  1.) Continue with same glasses  2.) Update glasses  3.) Refer to [address] [place] [address] ophthalmology fo

## 2018-12-19 ENCOUNTER — PRIOR ORIGINAL RECORDS (OUTPATIENT)
Dept: OTHER | Age: 78
End: 2018-12-19

## 2018-12-19 ENCOUNTER — LAB ENCOUNTER (OUTPATIENT)
Dept: LAB | Facility: HOSPITAL | Age: 78
End: 2018-12-19
Attending: INTERNAL MEDICINE
Payer: MEDICARE

## 2018-12-19 DIAGNOSIS — I10 ESSENTIAL HYPERTENSION, MALIGNANT: Primary | ICD-10-CM

## 2018-12-19 DIAGNOSIS — I50.1 LEFT HEART FAILURE (HCC): ICD-10-CM

## 2018-12-19 PROCEDURE — 85025 COMPLETE CBC W/AUTO DIFF WBC: CPT

## 2018-12-19 PROCEDURE — 80053 COMPREHEN METABOLIC PANEL: CPT

## 2018-12-19 PROCEDURE — 36415 COLL VENOUS BLD VENIPUNCTURE: CPT

## 2018-12-20 LAB
ALBUMIN: 3.8 G/DL
ALKALINE PHOSPHATATE(ALK PHOS): 66 IU/L
BILIRUBIN TOTAL: 0.9 MG/DL
BUN: 25 MG/DL
CALCIUM: 10 MG/DL
CHLORIDE: 106 MEQ/L
CREATININE, SERUM: 1.44 MG/DL
GLOBULIN: 2.8 G/DL
GLUCOSE: 102 MG/DL
HEMATOCRIT: 42.9 %
HEMOGLOBIN: 14.3 G/DL
PLATELETS: 204 K/UL
POTASSIUM, SERUM: 4.8 MEQ/L
PROTEIN, TOTAL: 6.6 G/DL
RED BLOOD COUNT: 4.45 X 10-6/U
SGOT (AST): 21 IU/L
SGPT (ALT): 16 IU/L
SODIUM: 141 MEQ/L
WHITE BLOOD COUNT: 4.8 X 10-3/U

## 2018-12-21 ENCOUNTER — LAB ENCOUNTER (OUTPATIENT)
Dept: LAB | Facility: HOSPITAL | Age: 78
End: 2018-12-21
Attending: UROLOGY
Payer: MEDICARE

## 2018-12-21 DIAGNOSIS — N13.8 BPH WITH URINARY OBSTRUCTION: ICD-10-CM

## 2018-12-21 DIAGNOSIS — N40.1 BPH WITH URINARY OBSTRUCTION: ICD-10-CM

## 2018-12-21 DIAGNOSIS — N40.0 BENIGN ENLARGEMENT OF PROSTATE: Primary | ICD-10-CM

## 2018-12-21 PROCEDURE — 36415 COLL VENOUS BLD VENIPUNCTURE: CPT

## 2019-02-06 ENCOUNTER — OFFICE VISIT (OUTPATIENT)
Dept: INTERNAL MEDICINE CLINIC | Facility: CLINIC | Age: 79
End: 2019-02-06
Payer: MEDICARE

## 2019-02-06 VITALS
SYSTOLIC BLOOD PRESSURE: 152 MMHG | DIASTOLIC BLOOD PRESSURE: 66 MMHG | HEART RATE: 71 BPM | HEIGHT: 69 IN | WEIGHT: 219 LBS | BODY MASS INDEX: 32.44 KG/M2

## 2019-02-06 DIAGNOSIS — I10 ESSENTIAL HYPERTENSION: Primary | ICD-10-CM

## 2019-02-06 PROCEDURE — G0463 HOSPITAL OUTPT CLINIC VISIT: HCPCS | Performed by: INTERNAL MEDICINE

## 2019-02-06 PROCEDURE — 99213 OFFICE O/P EST LOW 20 MIN: CPT | Performed by: INTERNAL MEDICINE

## 2019-02-06 RX ORDER — AMLODIPINE BESYLATE 2.5 MG/1
2.5 TABLET ORAL DAILY
Qty: 30 TABLET | Refills: 5 | Status: SHIPPED | OUTPATIENT
Start: 2019-02-06 | End: 2019-06-04 | Stop reason: ALTCHOICE

## 2019-02-06 NOTE — PROGRESS NOTES
Gale Colvni is a 67year old male. Patient presents with:  Hypercholesterolemia  Hypertension    HPI:   Dr. Jennifer Joya presents this morning for follow-up of hypertension. Lately he has been feeling well. BP checks at home average 150/90. Diet healthy. 7-18 with mild LVH, normal LV function, EF 55-60%, mild MR, severe LAE, mild TR with peak pulmonary artery pressure 38 mmHg   • Paroxysmal atrial fibrillation (HCC)    • Pulmonary hypertension ([address]     Echo 7-18 with peak pulmonary artery pressure 38 mmHg

## 2019-02-19 RX ORDER — ATORVASTATIN CALCIUM 20 MG/1
TABLET, FILM COATED ORAL
Qty: 90 TABLET | Refills: 3 | Status: SHIPPED | OUTPATIENT
Start: 2019-02-19 | End: 2020-03-18

## 2019-02-28 VITALS
RESPIRATION RATE: 18 BRPM | HEART RATE: 87 BPM | BODY MASS INDEX: 32.58 KG/M2 | WEIGHT: 220 LBS | DIASTOLIC BLOOD PRESSURE: 68 MMHG | OXYGEN SATURATION: 98 % | HEIGHT: 69 IN | SYSTOLIC BLOOD PRESSURE: 148 MMHG

## 2019-02-28 VITALS
RESPIRATION RATE: 18 BRPM | WEIGHT: 217 LBS | BODY MASS INDEX: 32.14 KG/M2 | SYSTOLIC BLOOD PRESSURE: 136 MMHG | HEIGHT: 69 IN | HEART RATE: 70 BPM | DIASTOLIC BLOOD PRESSURE: 70 MMHG

## 2019-02-28 VITALS
OXYGEN SATURATION: 96 % | DIASTOLIC BLOOD PRESSURE: 76 MMHG | HEART RATE: 92 BPM | BODY MASS INDEX: 31.7 KG/M2 | SYSTOLIC BLOOD PRESSURE: 130 MMHG | WEIGHT: 214 LBS | RESPIRATION RATE: 16 BRPM | HEIGHT: 69 IN

## 2019-03-01 VITALS
WEIGHT: 214 LBS | SYSTOLIC BLOOD PRESSURE: 150 MMHG | HEART RATE: 76 BPM | DIASTOLIC BLOOD PRESSURE: 70 MMHG | RESPIRATION RATE: 16 BRPM

## 2019-03-13 ENCOUNTER — OFFICE VISIT (OUTPATIENT)
Dept: INTERNAL MEDICINE CLINIC | Facility: CLINIC | Age: 79
End: 2019-03-13
Payer: MEDICARE

## 2019-03-13 VITALS
SYSTOLIC BLOOD PRESSURE: 156 MMHG | RESPIRATION RATE: 18 BRPM | HEART RATE: 79 BPM | BODY MASS INDEX: 32.73 KG/M2 | DIASTOLIC BLOOD PRESSURE: 66 MMHG | WEIGHT: 221 LBS | HEIGHT: 69 IN

## 2019-03-13 DIAGNOSIS — I10 ESSENTIAL HYPERTENSION: Primary | ICD-10-CM

## 2019-03-13 PROCEDURE — 99213 OFFICE O/P EST LOW 20 MIN: CPT | Performed by: INTERNAL MEDICINE

## 2019-03-13 PROCEDURE — G0463 HOSPITAL OUTPT CLINIC VISIT: HCPCS | Performed by: INTERNAL MEDICINE

## 2019-03-13 RX ORDER — AMLODIPINE BESYLATE 5 MG/1
5 TABLET ORAL DAILY
Qty: 30 TABLET | Refills: 3 | Status: SHIPPED | OUTPATIENT
Start: 2019-03-13 | End: 2019-06-04

## 2019-03-13 NOTE — PROGRESS NOTES
Sabine Tovar is a 67year old male. Patient presents with:  Hypertension    HPI:   Dr. Xiomara Kapoor presents this morning for follow-up of hypertension. At his last visit 1 month ago, low-dose amlodipine was started.   Diuretics previously caused worsening rufino Paroxysmal atrial fibrillation (HCC)    • Pulmonary hypertension ([address]     Echo 7-18 with peak pulmonary artery pressure 38 mmHg   • Second degree AV block     Status post pacemaker placement 5-14     Past Surgical History:   Procedure Laterality Date   •

## 2019-03-18 ENCOUNTER — ANCILLARY PROCEDURE (OUTPATIENT)
Dept: CARDIOLOGY | Age: 79
End: 2019-03-18
Attending: INTERNAL MEDICINE

## 2019-03-18 VITALS
RESPIRATION RATE: 16 BRPM | SYSTOLIC BLOOD PRESSURE: 160 MMHG | DIASTOLIC BLOOD PRESSURE: 68 MMHG | BODY MASS INDEX: 32.34 KG/M2 | HEART RATE: 62 BPM | WEIGHT: 219 LBS

## 2019-03-18 DIAGNOSIS — I25.10 CAD (CORONARY ARTERY DISEASE): ICD-10-CM

## 2019-03-18 PROCEDURE — 93280 PM DEVICE PROGR EVAL DUAL: CPT | Performed by: INTERNAL MEDICINE

## 2019-03-18 RX ORDER — ATORVASTATIN CALCIUM 20 MG/1
20 TABLET, FILM COATED ORAL DAILY
COMMUNITY
Start: 2014-05-08

## 2019-03-18 RX ORDER — TAMSULOSIN HYDROCHLORIDE 0.4 MG/1
0.4 CAPSULE ORAL
COMMUNITY
Start: 2008-01-29

## 2019-03-18 RX ORDER — FINASTERIDE 5 MG/1
5 TABLET, FILM COATED ORAL DAILY
COMMUNITY
Start: 2014-05-08

## 2019-03-18 RX ORDER — AMLODIPINE BESYLATE 5 MG/1
5 TABLET ORAL
COMMUNITY
Start: 2019-03-13 | End: 2020-03-07

## 2019-03-18 RX ORDER — LATANOPROST 50 UG/ML
SOLUTION/ DROPS OPHTHALMIC
COMMUNITY
Start: 2018-03-12

## 2019-03-18 RX ORDER — LISINOPRIL 20 MG/1
20 TABLET ORAL DAILY
COMMUNITY
Start: 2018-03-12

## 2019-03-18 RX ORDER — MELATONIN
25 DAILY
COMMUNITY
Start: 2014-04-21

## 2019-04-04 RX ORDER — HYDROCHLOROTHIAZIDE 25 MG/1
TABLET ORAL
COMMUNITY
End: 2019-07-02

## 2019-04-10 ENCOUNTER — OFFICE VISIT (OUTPATIENT)
Dept: OPHTHALMOLOGY | Facility: CLINIC | Age: 79
End: 2019-04-10
Payer: MEDICARE

## 2019-04-10 DIAGNOSIS — H40.1131 PRIMARY OPEN ANGLE GLAUCOMA (POAG) OF BOTH EYES, MILD STAGE: Primary | ICD-10-CM

## 2019-04-10 PROCEDURE — G0463 HOSPITAL OUTPT CLINIC VISIT: HCPCS | Performed by: OPHTHALMOLOGY

## 2019-04-10 PROCEDURE — 99213 OFFICE O/P EST LOW 20 MIN: CPT | Performed by: OPHTHALMOLOGY

## 2019-04-10 NOTE — PATIENT INSTRUCTIONS
Primary open angle glaucoma (POAG) of both eyes, mild stage   Intraocular pressure stable, tolerating medications. Will continue same regimen of Latanoprost in both eyes at bedtime. Will have patient back in 4 months for a pressure check.

## 2019-04-10 NOTE — ASSESSMENT & PLAN NOTE
Intraocular pressure stable, tolerating medications. Will continue same regimen of Latanoprost in both eyes at bedtime. Will have patient back in 4 months for a pressure check.

## 2019-04-10 NOTE — PROGRESS NOTES
Sophie Harper is a 67year old male. HPI:     HPI     Patient is here for an IOP check. Patient is taking Latanoprost OU QHS. Patient states vision is stable and has no ocular complaints at this time.      Last edited by Megan Greenberg OT on 4/10/2019 glasses of wine most days    Drug use: No      Medications:    Current Outpatient Medications:  amLODIPine Besylate 5 MG Oral Tab Take 1 tablet (5 mg total) by mouth daily.  Disp: 30 tablet Rfl: 3   ATORVASTATIN 20 MG Oral Tab TAKE 1 TABLET BY MOUTH ONCE DA Refraction     Wearing Rx       Sphere Cylinder Axis Add    Right -4.25 +0.75 180 +2.75    Left -2.50 +0.00 000 +2.75    Type:  Flat top bifocal                 ASSESSMENT/PLAN:     Diagnoses and Plan:     Primary open angle glaucoma (POAG) of both eyes,

## 2019-05-30 ENCOUNTER — PATIENT MESSAGE (OUTPATIENT)
Dept: INTERNAL MEDICINE CLINIC | Facility: CLINIC | Age: 79
End: 2019-05-30

## 2019-05-30 NOTE — TELEPHONE ENCOUNTER
From: Zahida Carrera  To: Jeromy Walker MD  Sent: 5/30/2019 11:29 AM CDT  Subject: Other    To save time at my coming appointment, I last saw you on March 13, at which time you increased my dosage of Amlodipine from 2.5 mg to 5.0 mg, with instructions t

## 2019-06-04 ENCOUNTER — OFFICE VISIT (OUTPATIENT)
Dept: INTERNAL MEDICINE CLINIC | Facility: CLINIC | Age: 79
End: 2019-06-04
Payer: MEDICARE

## 2019-06-04 VITALS
DIASTOLIC BLOOD PRESSURE: 64 MMHG | WEIGHT: 220 LBS | HEART RATE: 69 BPM | BODY MASS INDEX: 32.58 KG/M2 | SYSTOLIC BLOOD PRESSURE: 136 MMHG | HEIGHT: 69 IN

## 2019-06-04 DIAGNOSIS — I10 ESSENTIAL HYPERTENSION: Primary | ICD-10-CM

## 2019-06-04 PROCEDURE — G0463 HOSPITAL OUTPT CLINIC VISIT: HCPCS | Performed by: INTERNAL MEDICINE

## 2019-06-04 PROCEDURE — 99213 OFFICE O/P EST LOW 20 MIN: CPT | Performed by: INTERNAL MEDICINE

## 2019-06-04 RX ORDER — AMLODIPINE BESYLATE 5 MG/1
5 TABLET ORAL DAILY
Qty: 90 TABLET | Refills: 1 | Status: SHIPPED | OUTPATIENT
Start: 2019-06-04 | End: 2019-12-05

## 2019-06-04 NOTE — PROGRESS NOTES
Gale Colvin is a 67year old male. Patient presents with:  Hypertension    HPI:   Dr. Jennifer Joya presents this morning for follow-up of hypertension. At his last visit in March, dose of amlodipine was increased.     His daughter has recently been battling p severe LAE, mild TR with peak pulmonary artery pressure 38 mmHg   • Paroxysmal atrial fibrillation (HCC)    • Pulmonary hypertension (HCC)     Echo 7-18 with peak pulmonary artery pressure 38 mmHg   • Second degree AV block     Status post pacemaker placem

## 2019-06-12 ENCOUNTER — ANCILLARY ORDERS (OUTPATIENT)
Dept: CARDIOLOGY | Age: 79
End: 2019-06-12

## 2019-06-12 ENCOUNTER — ANCILLARY PROCEDURE (OUTPATIENT)
Dept: CARDIOLOGY | Age: 79
End: 2019-06-12
Attending: INTERNAL MEDICINE

## 2019-06-12 DIAGNOSIS — Z95.0 PACEMAKER: ICD-10-CM

## 2019-06-12 PROCEDURE — 93294 REM INTERROG EVL PM/LDLS PM: CPT | Performed by: INTERNAL MEDICINE

## 2019-06-13 ENCOUNTER — TELEPHONE (OUTPATIENT)
Dept: CARDIOLOGY | Age: 79
End: 2019-06-13

## 2019-07-02 ENCOUNTER — OFFICE VISIT (OUTPATIENT)
Dept: CARDIOLOGY | Age: 79
End: 2019-07-02

## 2019-07-02 VITALS
SYSTOLIC BLOOD PRESSURE: 134 MMHG | WEIGHT: 220 LBS | BODY MASS INDEX: 32.58 KG/M2 | HEIGHT: 69 IN | DIASTOLIC BLOOD PRESSURE: 62 MMHG | HEART RATE: 94 BPM

## 2019-07-02 DIAGNOSIS — I48.0 PAROXYSMAL ATRIAL FIBRILLATION (CMD): ICD-10-CM

## 2019-07-02 DIAGNOSIS — E78.5 DYSLIPIDEMIA: ICD-10-CM

## 2019-07-02 DIAGNOSIS — I10 ESSENTIAL HYPERTENSION: ICD-10-CM

## 2019-07-02 DIAGNOSIS — I44.2 THIRD DEGREE ATRIOVENTRICULAR BLOCK (CMD): ICD-10-CM

## 2019-07-02 DIAGNOSIS — I25.10 CORONARY ARTERY DISEASE INVOLVING NATIVE CORONARY ARTERY OF NATIVE HEART WITHOUT ANGINA PECTORIS: Primary | ICD-10-CM

## 2019-07-02 DIAGNOSIS — Z95.0 PACEMAKER: ICD-10-CM

## 2019-07-02 PROCEDURE — 99214 OFFICE O/P EST MOD 30 MIN: CPT | Performed by: INTERNAL MEDICINE

## 2019-07-17 ENCOUNTER — CLINICAL ABSTRACT (OUTPATIENT)
Dept: CARDIOLOGY | Age: 79
End: 2019-07-17

## 2019-07-17 ENCOUNTER — LAB ENCOUNTER (OUTPATIENT)
Dept: LAB | Facility: HOSPITAL | Age: 79
End: 2019-07-17
Attending: INTERNAL MEDICINE
Payer: MEDICARE

## 2019-07-17 DIAGNOSIS — I10 HTN (HYPERTENSION): ICD-10-CM

## 2019-07-17 DIAGNOSIS — I48.0 PAROXYSMAL ATRIAL FIBRILLATION (HCC): ICD-10-CM

## 2019-07-17 DIAGNOSIS — I44.2 THIRD DEGREE ATRIOVENTRICULAR BLOCK (HCC): ICD-10-CM

## 2019-07-17 DIAGNOSIS — Z95.0 PACEMAKER: ICD-10-CM

## 2019-07-17 DIAGNOSIS — E78.5 HYPERLIPEMIA: ICD-10-CM

## 2019-07-17 DIAGNOSIS — I25.10 CORONARY ATHEROSCLEROSIS OF NATIVE CORONARY ARTERY: Primary | ICD-10-CM

## 2019-07-17 LAB
ABSOLUTE IMMATURE GRANULOCYTES (OFFPRE24): NORMAL
ALBUMIN SERPL-MCNC: 3.8 G/DL
ALBUMIN SERPL-MCNC: 3.8 G/DL (ref 3.4–5)
ALBUMIN/GLOB SERPL: 1.1 {RATIO}
ALBUMIN/GLOB SERPL: 1.1 {RATIO} (ref 1–2)
ALP LIVER SERPL-CCNC: 79 U/L (ref 45–117)
ALP SERPL-CCNC: 79 U/L
ALT SERPL-CCNC: 26 U/L
ALT SERPL-CCNC: 26 U/L (ref 16–61)
ANION GAP SERPL CALC-SCNC: 8 MMOL/L (ref 0–18)
ANION GAP SERPL CALC-SCNC: NORMAL MMOL/L
AST SERPL-CCNC: 25 U/L
AST SERPL-CCNC: 25 U/L (ref 15–37)
BASO+EOS+MONOS # BLD: NORMAL 10*3/UL
BASO+EOS+MONOS NFR BLD: NORMAL %
BASOPHILS # BLD AUTO: 0.02 X10(3) UL (ref 0–0.2)
BASOPHILS # BLD: NORMAL 10*3/UL
BASOPHILS NFR BLD AUTO: 0.6 %
BASOPHILS NFR BLD: NORMAL %
BILIRUB SERPL-MCNC: 0.7 MG/DL
BILIRUB SERPL-MCNC: 0.7 MG/DL (ref 0.1–2)
BUN BLD-MCNC: 28 MG/DL (ref 7–18)
BUN SERPL-MCNC: 28 MG/DL
BUN/CREAT SERPL: 22.4
BUN/CREAT SERPL: 22.4 (ref 10–20)
CALCIUM BLD-MCNC: 9.7 MG/DL (ref 8.5–10.1)
CALCIUM SERPL-MCNC: 9.7 MG/DL
CHLORIDE SERPL-SCNC: 108 MMOL/L
CHLORIDE SERPL-SCNC: 108 MMOL/L (ref 98–112)
CHOLEST SERPL-MCNC: 190 MG/DL
CHOLEST SMN-MCNC: 190 MG/DL (ref ?–200)
CHOLEST/HDLC SERPL: 116 {RATIO}
CO2 SERPL-SCNC: 25 MMOL/L (ref 21–32)
CO2 SERPL-SCNC: NORMAL MMOL/L
CREAT BLD-MCNC: 1.25 MG/DL (ref 0.7–1.3)
CREAT SERPL-MCNC: 1.25 MG/DL
DEPRECATED RDW RBC AUTO: 44.8 FL (ref 35.1–46.3)
DIFFERENTIAL METHOD BLD: NORMAL
EOSINOPHIL # BLD AUTO: 0.18 X10(3) UL (ref 0–0.7)
EOSINOPHIL # BLD: NORMAL 10*3/UL
EOSINOPHIL NFR BLD AUTO: 5.1 %
EOSINOPHIL NFR BLD: NORMAL %
ERYTHROCYTE [DISTWIDTH] IN BLOOD BY AUTOMATED COUNT: 12.3 % (ref 11–15)
ERYTHROCYTE [DISTWIDTH] IN BLOOD: NORMAL %
GLOBULIN PLAS-MCNC: 3.5 G/DL (ref 2.8–4.4)
GLOBULIN SER-MCNC: 3.5 G/DL
GLUCOSE BLD-MCNC: 99 MG/DL (ref 70–99)
GLUCOSE SERPL-MCNC: 99 MG/DL
HCT VFR BLD AUTO: 42.6 % (ref 39–53)
HCT VFR BLD CALC: 42.6 %
HDLC SERPL-MCNC: 116 MG/DL (ref 40–59)
HDLC SERPL-MCNC: NORMAL MG/DL
HGB BLD-MCNC: 14.1 G/DL
HGB BLD-MCNC: 14.1 G/DL (ref 13–17.5)
IMM GRANULOCYTES # BLD AUTO: 0.01 X10(3) UL (ref 0–1)
IMM GRANULOCYTES NFR BLD: 0.3 %
IMMATURE GRANULOCYTES (OFFPRE25): NORMAL
LDLC SERPL CALC-MCNC: 60 MG/DL
LDLC SERPL CALC-MCNC: 60 MG/DL (ref ?–100)
LENGTH OF FAST TIME PATIENT: YES H
LENGTH OF FAST TIME PATIENT: YES H
LYMPHOCYTES # BLD AUTO: 0.65 X10(3) UL (ref 1–4)
LYMPHOCYTES # BLD: NORMAL 10*3/UL
LYMPHOCYTES NFR BLD AUTO: 18.5 %
LYMPHOCYTES NFR BLD: NORMAL %
M PROTEIN MFR SERPL ELPH: 7.3 G/DL (ref 6.4–8.2)
MCH RBC QN AUTO: 32.8 PG (ref 26–34)
MCH RBC QN AUTO: NORMAL PG
MCHC RBC AUTO-ENTMCNC: 33.1 G/DL (ref 31–37)
MCHC RBC AUTO-ENTMCNC: NORMAL G/DL
MCV RBC AUTO: 99.1 FL (ref 80–100)
MCV RBC AUTO: NORMAL FL
MONOCYTES # BLD AUTO: 0.62 X10(3) UL (ref 0.1–1)
MONOCYTES # BLD: NORMAL 10*3/UL
MONOCYTES NFR BLD AUTO: 17.7 %
MONOCYTES NFR BLD: NORMAL %
MPV (OFFPRE2): NORMAL
NEUTROPHILS # BLD AUTO: 2.03 X10 (3) UL (ref 1.5–7.7)
NEUTROPHILS # BLD AUTO: 2.03 X10(3) UL (ref 1.5–7.7)
NEUTROPHILS # BLD: NORMAL 10*3/UL
NEUTROPHILS NFR BLD AUTO: 57.8 %
NEUTROPHILS NFR BLD: NORMAL %
NONHDLC SERPL-MCNC: 74 MG/DL
NONHDLC SERPL-MCNC: 74 MG/DL (ref ?–130)
NRBC BLD MANUAL-RTO: NORMAL %
OSMOLALITY SERPL CALC.SUM OF ELEC: 298 MOSM/KG (ref 275–295)
PATIENT FASTING: YES
PATIENT FASTING: YES
PLAT MORPH BLD: NORMAL
PLATELET # BLD AUTO: 170 10(3)UL (ref 150–450)
PLATELET # BLD: 170 10*3/UL
POTASSIUM SERPL-SCNC: 4.8 MMOL/L
POTASSIUM SERPL-SCNC: 4.8 MMOL/L (ref 3.5–5.1)
PROT SERPL-MCNC: 7.3 G/DL
RBC # BLD AUTO: 4.3 X10(6)UL (ref 3.8–5.8)
RBC # BLD: 4.3 10*6/UL
RBC MORPH BLD: NORMAL
SODIUM SERPL-SCNC: 141 MMOL/L
SODIUM SERPL-SCNC: 141 MMOL/L (ref 136–145)
TRIGL SERPL-MCNC: 71 MG/DL
TRIGL SERPL-MCNC: 71 MG/DL (ref 30–149)
VLDLC SERPL CALC-MCNC: 14 MG/DL
VLDLC SERPL CALC-MCNC: 14 MG/DL (ref 0–30)
WBC # BLD AUTO: 3.5 X10(3) UL (ref 4–11)
WBC # BLD: 3.5 10*3/UL
WBC MORPH BLD: NORMAL

## 2019-07-17 PROCEDURE — 85025 COMPLETE CBC W/AUTO DIFF WBC: CPT

## 2019-07-17 PROCEDURE — 80053 COMPREHEN METABOLIC PANEL: CPT

## 2019-07-17 PROCEDURE — 36415 COLL VENOUS BLD VENIPUNCTURE: CPT

## 2019-07-17 PROCEDURE — 80061 LIPID PANEL: CPT

## 2019-07-30 RX ORDER — LISINOPRIL 20 MG/1
TABLET ORAL
Qty: 90 TABLET | Refills: 1 | Status: SHIPPED | OUTPATIENT
Start: 2019-07-30 | End: 2020-01-06

## 2019-08-21 ENCOUNTER — OFFICE VISIT (OUTPATIENT)
Dept: OPHTHALMOLOGY | Facility: CLINIC | Age: 79
End: 2019-08-21
Payer: MEDICARE

## 2019-08-21 DIAGNOSIS — H40.1131 PRIMARY OPEN ANGLE GLAUCOMA (POAG) OF BOTH EYES, MILD STAGE: Primary | ICD-10-CM

## 2019-08-21 PROCEDURE — 99213 OFFICE O/P EST LOW 20 MIN: CPT | Performed by: OPHTHALMOLOGY

## 2019-08-21 PROCEDURE — G0463 HOSPITAL OUTPT CLINIC VISIT: HCPCS | Performed by: OPHTHALMOLOGY

## 2019-08-21 NOTE — PROGRESS NOTES
Bro Trotter is a 67year old male. HPI:     HPI     Pt is here for an IOP check. Pt is taking Latanoprost OU QHS. Pt complains of OD not as sharp as OS for the past 3 months.      Last edited by Mumtaz Choi OT on 8/21/2019  8:42 AM. (History) wine most days    Drug use: No      Medications:    Current Outpatient Medications:  LISINOPRIL 20 MG Oral Tab TAKE 1 TABLET BY MOUTH DAILY Disp: 90 tablet Rfl: 1   amLODIPine Besylate 5 MG Oral Tab Take 1 tablet (5 mg total) by mouth daily.  Disp: 90 table Manifest Refraction    Pt will wait til next visit for refraction                  ASSESSMENT/PLAN:     Diagnoses and Plan:     Primary open angle glaucoma (POAG) of both eyes, mild stage  Intraocular pressure stable, tolerating medications.   Will

## 2019-08-21 NOTE — PATIENT INSTRUCTIONS
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 Will continue same regimen of Latanoprost in both eyes at bedtime. Return in 4 months for VF, OCT and EE/ refraction with Photos.

## 2019-08-21 NOTE — ASSESSMENT & PLAN NOTE
Intraocular pressure stable, tolerating medications. Will continue same regimen of Latanoprost in both eyes at bedtime. Return in 4 months for VF, OCT and EE/ refraction with Photos.

## 2019-09-09 RX ORDER — APIXABAN 5 MG/1
TABLET, FILM COATED ORAL
Qty: 60 TABLET | Refills: 6 | Status: SHIPPED | OUTPATIENT
Start: 2019-09-09 | End: 2020-04-06

## 2019-09-09 RX ORDER — LATANOPROST 50 UG/ML
SOLUTION/ DROPS OPHTHALMIC
Qty: 3 BOTTLE | Refills: 3 | Status: SHIPPED | OUTPATIENT
Start: 2019-09-09 | End: 2020-06-01

## 2019-09-09 NOTE — TELEPHONE ENCOUNTER
LDE: 12/13/18   Last visit: 8/21/19   Due for: VF, OCT, dilated EE and refraction with photos  Upcoming visit: 12/17/19

## 2019-09-12 ENCOUNTER — ANCILLARY PROCEDURE (OUTPATIENT)
Dept: CARDIOLOGY | Age: 79
End: 2019-09-12
Attending: INTERNAL MEDICINE

## 2019-09-12 DIAGNOSIS — Z95.0 CARDIAC PACEMAKER: ICD-10-CM

## 2019-11-25 ENCOUNTER — HOSPITAL ENCOUNTER (OUTPATIENT)
Age: 79
Discharge: HOME | End: 2019-11-25
Attending: FAMILY MEDICINE
Payer: MEDICARE

## 2019-11-25 VITALS
HEART RATE: 70 BPM | SYSTOLIC BLOOD PRESSURE: 168 MMHG | RESPIRATION RATE: 20 BRPM | TEMPERATURE: 99 F | DIASTOLIC BLOOD PRESSURE: 61 MMHG | OXYGEN SATURATION: 97 %

## 2019-11-25 DIAGNOSIS — R03.0 ELEVATED BLOOD PRESSURE READING: ICD-10-CM

## 2019-11-25 DIAGNOSIS — J06.9 VIRAL UPPER RESPIRATORY TRACT INFECTION: Primary | ICD-10-CM

## 2019-11-25 PROCEDURE — 99212 OFFICE O/P EST SF 10 MIN: CPT

## 2019-11-25 PROCEDURE — 99202 OFFICE O/P NEW SF 15 MIN: CPT

## 2019-11-25 PROCEDURE — 87430 STREP A AG IA: CPT

## 2019-11-25 NOTE — ED PROVIDER NOTES
Patient Seen in: [address] [facility]    History   Patient presents with:  Sore Throat    Stated Complaint: cough    HPI    Patient here with sore throat for 2 days. Also has some nasal congestion and mild cough.   No travel,no know Family History   Problem Relation Age of Onset   • Diabetes Father    • Diabetes Paternal [de-identified]    • Hypertension Mother    • Other (CVA) Mother         Age 50   • Colon Cancer Paternal Grandmother    • Other (Lung Cancer) Paternal Grandfather    • M Pt refused cough medicine. Said sx are not so bad. Cont to monitor BP. F/U with PCP in 1 week. F/U sooner if sx worsen.     Disposition and Plan     Clinical Impression:  Viral upper respiratory tract infection  (primary encounter diagnosis)  Elevated blood

## 2019-11-25 NOTE — ED INITIAL ASSESSMENT (HPI)
PATIENT ARRIVED AMBULATORY TO ROOM C/O A SORE THROAT THAT STARTED 2 DAYS AGO. SLIGHT COUGH. SLIGHT NASAL CONGESTION. NO FEVERS. NO N/V/D. NO CHEST PAIN. NO SOB. EASY NON LABORED RESPIRATIONS.

## 2019-12-07 RX ORDER — AMLODIPINE BESYLATE 5 MG/1
5 TABLET ORAL DAILY
Qty: 90 TABLET | Refills: 0 | Status: SHIPPED | OUTPATIENT
Start: 2019-12-07 | End: 2020-03-11

## 2019-12-15 ENCOUNTER — ANCILLARY PROCEDURE (OUTPATIENT)
Dept: CARDIOLOGY | Age: 79
End: 2019-12-15
Attending: INTERNAL MEDICINE

## 2019-12-15 DIAGNOSIS — Z95.0 CARDIAC PACEMAKER IN SITU: ICD-10-CM

## 2019-12-16 PROCEDURE — 93294 REM INTERROG EVL PM/LDLS PM: CPT | Performed by: INTERNAL MEDICINE

## 2019-12-17 ENCOUNTER — OFFICE VISIT (OUTPATIENT)
Dept: OPHTHALMOLOGY | Facility: CLINIC | Age: 79
End: 2019-12-17
Payer: MEDICARE

## 2019-12-17 DIAGNOSIS — H25.13 AGE-RELATED NUCLEAR CATARACT OF BOTH EYES: ICD-10-CM

## 2019-12-17 DIAGNOSIS — H40.1131 PRIMARY OPEN ANGLE GLAUCOMA (POAG) OF BOTH EYES, MILD STAGE: Primary | ICD-10-CM

## 2019-12-17 DIAGNOSIS — H43.393 VITREOUS FLOATERS OF BOTH EYES: ICD-10-CM

## 2019-12-17 PROCEDURE — 92083 EXTENDED VISUAL FIELD XM: CPT | Performed by: OPHTHALMOLOGY

## 2019-12-17 PROCEDURE — 92133 CPTRZD OPH DX IMG PST SGM ON: CPT | Performed by: OPHTHALMOLOGY

## 2019-12-17 PROCEDURE — 92250 FUNDUS PHOTOGRAPHY W/I&R: CPT | Performed by: OPHTHALMOLOGY

## 2019-12-17 PROCEDURE — 92014 COMPRE OPH EXAM EST PT 1/>: CPT | Performed by: OPHTHALMOLOGY

## 2019-12-17 PROCEDURE — 92015 DETERMINE REFRACTIVE STATE: CPT | Performed by: OPHTHALMOLOGY

## 2019-12-17 NOTE — ASSESSMENT & PLAN NOTE
Intraocular pressure stable, tolerating medications. Will continue same regimen of Latanoprost in both eyes at bedtime. VF, OCT done in the office today showing stable results. Photos taken today to document optic nerves.

## 2019-12-17 NOTE — ASSESSMENT & PLAN NOTE
Discussed cataracts in detail with patient. Discussed that cataracts are advanced enough to consider cataract surgery, but it would be patient's choice.      Options:  1.) Continue with same glasses  2.) Update glasses  3.) Refer to [address] [place] [address] ophthalmology fo

## 2019-12-17 NOTE — PATIENT INSTRUCTIONS
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 Will continue same regimen of Latanoprost in both eyes at bedtime. VF, OCT done in the office today showing stable results.    Photos taken

## 2019-12-17 NOTE — PROGRESS NOTES
Godwin Stevens is a 67year old male. HPI:     HPI     Here for a VF, OCT, EE with Refraction and Photos. Pt has been using Latanoprost OU QHS as directed. Pt would like an updated glasses Rx today. Pt denies any eye pain.      Last edited by Lance Estrada Alcohol/week: 0.0 standard drinks      Comment: 2 glasses of wine most days    Drug use: No      Medications:  Current Outpatient Medications   Medication Sig Dispense Refill   • AMLODIPINE BESYLATE 5 MG Oral Tab TAKE 1 TABLET (5 MG TOTAL) BY MOUTH DAILY. sclerosis, Trace Cortical cataract 2+ Nuclear sclerosis, Trace Cortical cataract    Vitreous Cooley ring Vitreous floaters          Fundus Exam       Right Left    Disc Sloping margin, Temporal crescent Sloping margin, Temporal crescent    C/D Ratio 0.7 0.6 about 4 months (around 4/17/2020) for IOP check.     12/17/2019  Scribed by: Romelia Damon MD

## 2020-01-06 RX ORDER — LISINOPRIL 20 MG/1
TABLET ORAL
Qty: 90 TABLET | Refills: 1 | Status: SHIPPED | OUTPATIENT
Start: 2020-01-06 | End: 2020-07-20

## 2020-01-10 ENCOUNTER — OFFICE VISIT (OUTPATIENT)
Dept: INTERNAL MEDICINE CLINIC | Facility: CLINIC | Age: 80
End: 2020-01-10
Payer: MEDICARE

## 2020-01-10 VITALS
WEIGHT: 220.13 LBS | HEIGHT: 69 IN | BODY MASS INDEX: 32.6 KG/M2 | DIASTOLIC BLOOD PRESSURE: 62 MMHG | HEART RATE: 63 BPM | SYSTOLIC BLOOD PRESSURE: 134 MMHG

## 2020-01-10 DIAGNOSIS — I70.0 AORTIC ATHEROSCLEROSIS (HCC): ICD-10-CM

## 2020-01-10 DIAGNOSIS — N18.30 CHRONIC KIDNEY DISEASE, STAGE 3 (HCC): ICD-10-CM

## 2020-01-10 DIAGNOSIS — E78.00 HYPERCHOLESTEROLEMIA: ICD-10-CM

## 2020-01-10 DIAGNOSIS — I48.0 PAROXYSMAL ATRIAL FIBRILLATION (HCC): ICD-10-CM

## 2020-01-10 DIAGNOSIS — I25.10 ASHD (ARTERIOSCLEROTIC HEART DISEASE): ICD-10-CM

## 2020-01-10 DIAGNOSIS — Z00.00 ANNUAL PHYSICAL EXAM: Primary | ICD-10-CM

## 2020-01-10 DIAGNOSIS — I44.1 SECOND DEGREE AV BLOCK: ICD-10-CM

## 2020-01-10 DIAGNOSIS — I10 ESSENTIAL HYPERTENSION: ICD-10-CM

## 2020-01-10 DIAGNOSIS — I27.20 PULMONARY HYPERTENSION (HCC): ICD-10-CM

## 2020-01-10 DIAGNOSIS — Z00.00 ENCOUNTER FOR ANNUAL HEALTH EXAMINATION: ICD-10-CM

## 2020-01-10 PROCEDURE — G0008 ADMIN INFLUENZA VIRUS VAC: HCPCS | Performed by: INTERNAL MEDICINE

## 2020-01-10 PROCEDURE — G0463 HOSPITAL OUTPT CLINIC VISIT: HCPCS | Performed by: INTERNAL MEDICINE

## 2020-01-10 PROCEDURE — 90662 IIV NO PRSV INCREASED AG IM: CPT | Performed by: INTERNAL MEDICINE

## 2020-01-10 PROCEDURE — 99213 OFFICE O/P EST LOW 20 MIN: CPT | Performed by: INTERNAL MEDICINE

## 2020-01-10 PROCEDURE — G0439 PPPS, SUBSEQ VISIT: HCPCS | Performed by: INTERNAL MEDICINE

## 2020-01-10 NOTE — PATIENT INSTRUCTIONS
You received a flu shot today. Please obtain blood tests soon when you can. Continue current medications. Continue healthy diet and regular physical activity. Return visit in 6 months.   [facility]'s SCREENING SCHEDULE   Tests on this list are christine years old and have smoked more than 100 cigarettes in their lifetime   • Anyone with a family history    Colorectal Cancer Screening Covered up to Age 76     Colonoscopy Screen   Covered every 10 years- more often if abnormal There are no preventive care r renal disease   Hemophiliacs who received Factor VIII or IX concentrates   Clients of institutions for the mentally retarded   Persons who live in the same house as a HepB virus carrier   Homosexual men   Illicit injectable drug abusers     Tetanus Toxoid- no

## 2020-01-10 NOTE — PROGRESS NOTES
HPI:   Pavithra Tidwell is a 67year old male who presents this morning for a Medicare Subsequent Annual Wellness visit (Pt already had Initial Annual Wellness). His last Medicare physical was November 2017. Lately he has been feeling well.   No specific quit greater than 12 months ago.   Social History    Tobacco Use      Smoking status: Former Smoker        Packs/day: 0.10        Years: 28.00        Pack years: 2.8        Types: Cigarettes        Quit date: 1998        Years since quittin.0 g. CURRENT MEDICATIONS:   LISINOPRIL 20 MG Oral Tab, TAKE 1 TABLET BY MOUTH DAILY  AMLODIPINE BESYLATE 5 MG Oral Tab, TAKE 1 TABLET (5 MG TOTAL) BY MOUTH DAILY.   latanoprost 0.005 % Ophthalmic Solution, INSTILL ONE DROP IN EACH EYE NIGHTLY  ATORVASTATIN stable lower leg swelling bilaterally  NEURO: No headaches    EXAM:   /62   Pulse 63   Ht 5' 9\" (1.753 m)   Wt 220 lb 1.6 oz (99.8 kg)   BMI 32.50 kg/m²   Estimated body mass index is 32.5 kg/m² as calculated from the following:    Height as of this Pleasant male appearing well in no distress  /62   Pulse 63   Ht 5' 9\" (1.753 m)   Wt 220 lb 1.6 oz (99.8 kg)   BMI 32.50 kg/m²   HEENT: Anicteric, conjunctiva pink, oropharynx normal  NECK: Supple without mass or thyromegaly  NODES: No peripheral a Urology and Dermatology. Annual Medicare physical.  Return visit in 6 months. Essential hypertension  Well-controlled    ASHD (arteriosclerotic heart disease)  Stable and asymptomatic on current medication  -     COMP METABOLIC PANEL (14);  Future  - 07/17/2019 61        EKG - w/ Initial Preventative Physical Exam only, or if medically necessary Electrocardiogram date    Colorectal Cancer Screening      Colonoscopy Screen every 10 years There are no preventive care reminders to display for this patie Medications (ACE/ARB, digoxin diuretics, anticonvulsants.)    Potassium  Annually Potassium (mmol/L)   Date Value   07/17/2019 4.8     POTASSIUM (P) (mmol/L)   Date Value   09/11/2015 4.5    No flowsheet data found.     Creatinine  Annually Creatinine (mg

## 2020-01-14 ENCOUNTER — APPOINTMENT (OUTPATIENT)
Dept: LAB | Facility: HOSPITAL | Age: 80
End: 2020-01-14
Attending: INTERNAL MEDICINE
Payer: MEDICARE

## 2020-01-14 DIAGNOSIS — I25.10 ASHD (ARTERIOSCLEROTIC HEART DISEASE): ICD-10-CM

## 2020-01-14 DIAGNOSIS — E78.00 HYPERCHOLESTEROLEMIA: ICD-10-CM

## 2020-01-14 LAB
ALBUMIN SERPL-MCNC: 3.8 G/DL
ALBUMIN SERPL-MCNC: 3.8 G/DL (ref 3.4–5)
ALBUMIN/GLOB SERPL: 1.2 {RATIO}
ALBUMIN/GLOB SERPL: 1.2 {RATIO} (ref 1–2)
ALP LIVER SERPL-CCNC: 65 U/L (ref 45–117)
ALP SERPL-CCNC: 65 U/L
ALT SERPL-CCNC: 19 U/L (ref 16–61)
ALT SERPL-CCNC: 19 UNITS/L
ANION GAP SERPL CALC-SCNC: 7 MMOL/L
ANION GAP SERPL CALC-SCNC: 7 MMOL/L (ref 0–18)
AST SERPL-CCNC: 20 U/L (ref 15–37)
AST SERPL-CCNC: 20 UNITS/L
BILIRUB SERPL-MCNC: 0.9 MG/DL
BILIRUB SERPL-MCNC: 0.9 MG/DL (ref 0.1–2)
BUN BLD-MCNC: 40 MG/DL (ref 7–18)
BUN SERPL-MCNC: 40 MG/DL
BUN/CREAT SERPL: 29.4
BUN/CREAT SERPL: 29.4 (ref 10–20)
CALCIUM BLD-MCNC: 9.5 MG/DL (ref 8.5–10.1)
CALCIUM SERPL-MCNC: 9.5 MG/DL
CHLORIDE SERPL-SCNC: 108 MMOL/L
CHLORIDE SERPL-SCNC: 108 MMOL/L (ref 98–112)
CHOLEST SERPL-MCNC: 187 MG/DL
CHOLEST SMN-MCNC: 187 MG/DL (ref ?–200)
CO2 SERPL-SCNC: 26 MMOL/L
CO2 SERPL-SCNC: 26 MMOL/L (ref 21–32)
CREAT BLD-MCNC: 1.36 MG/DL (ref 0.7–1.3)
CREAT SERPL-MCNC: 1.36 MG/DL
DEPRECATED RDW RBC AUTO: 44.7 FL (ref 35.1–46.3)
ERYTHROCYTE [DISTWIDTH] IN BLOOD BY AUTOMATED COUNT: 12.1 % (ref 11–15)
GLOBULIN PLAS-MCNC: 3.3 G/DL (ref 2.8–4.4)
GLOBULIN SER-MCNC: 3.3 G/DL
GLUCOSE BLD-MCNC: 91 MG/DL (ref 70–99)
GLUCOSE SERPL-MCNC: 91 MG/DL
HCT VFR BLD AUTO: 43.2 % (ref 39–53)
HCT VFR BLD CALC: 43.2 %
HDLC SERPL-MCNC: 104 MG/DL
HDLC SERPL-MCNC: 104 MG/DL (ref 40–59)
HGB BLD-MCNC: 14.1 G/DL
HGB BLD-MCNC: 14.1 G/DL (ref 13–17.5)
LDLC SERPL CALC-MCNC: 68 MG/DL
LDLC SERPL CALC-MCNC: 68 MG/DL (ref ?–100)
M PROTEIN MFR SERPL ELPH: 7.1 G/DL (ref 6.4–8.2)
MCH RBC QN AUTO: 32.5 PG (ref 26–34)
MCHC RBC AUTO-ENTMCNC: 32.6 G/DL (ref 31–37)
MCV RBC AUTO: 99.5 FL (ref 80–100)
NONHDLC SERPL-MCNC: 83 MG/DL
NONHDLC SERPL-MCNC: 83 MG/DL (ref ?–130)
OSMOLALITY SERPL CALC.SUM OF ELEC: 301 MOSM/KG (ref 275–295)
PATIENT FASTING Y/N/NP: YES
PATIENT FASTING Y/N/NP: YES
PLATELET # BLD AUTO: 169 10(3)UL (ref 150–450)
PLATELET # BLD: 169 K/MCL
POTASSIUM SERPL-SCNC: 4.7 MMOL/L
POTASSIUM SERPL-SCNC: 4.7 MMOL/L (ref 3.5–5.1)
PROT SERPL-MCNC: 7.1 G/DL
RBC # BLD AUTO: 4.34 X10(6)UL (ref 3.8–5.8)
RBC # BLD: 4.34 10*6/UL
SODIUM SERPL-SCNC: 141 MMOL/L
SODIUM SERPL-SCNC: 141 MMOL/L (ref 136–145)
TRIGL SERPL-MCNC: 76 MG/DL
TRIGL SERPL-MCNC: 76 MG/DL (ref 30–149)
VLDLC SERPL CALC-MCNC: 15 MG/DL (ref 0–30)
WBC # BLD AUTO: 3.6 X10(3) UL (ref 4–11)
WBC # BLD: 3.6 K/MCL

## 2020-01-14 PROCEDURE — 80061 LIPID PANEL: CPT

## 2020-01-14 PROCEDURE — 36415 COLL VENOUS BLD VENIPUNCTURE: CPT

## 2020-01-14 PROCEDURE — 85027 COMPLETE CBC AUTOMATED: CPT

## 2020-01-14 PROCEDURE — 80053 COMPREHEN METABOLIC PANEL: CPT

## 2020-03-03 ENCOUNTER — ANCILLARY PROCEDURE (OUTPATIENT)
Dept: CARDIOLOGY | Age: 80
End: 2020-03-03
Attending: INTERNAL MEDICINE

## 2020-03-03 ENCOUNTER — TELEPHONE (OUTPATIENT)
Dept: CARDIOLOGY | Age: 80
End: 2020-03-03

## 2020-03-03 DIAGNOSIS — Z45.018 ENCOUNTER FOR CARE OF PACEMAKER: ICD-10-CM

## 2020-03-11 RX ORDER — AMLODIPINE BESYLATE 5 MG/1
5 TABLET ORAL DAILY
Qty: 90 TABLET | Refills: 1 | Status: SHIPPED | OUTPATIENT
Start: 2020-03-11 | End: 2020-08-28

## 2020-03-11 NOTE — TELEPHONE ENCOUNTER
Please review; protocol failed.     Hypertensive Medications Protocol Failed3/9 9:13 AM   GFR Non- > 50

## 2020-03-18 RX ORDER — ATORVASTATIN CALCIUM 20 MG/1
TABLET, FILM COATED ORAL
Qty: 90 TABLET | Refills: 2 | Status: SHIPPED | OUTPATIENT
Start: 2020-03-18 | End: 2020-08-28

## 2020-03-19 ENCOUNTER — APPOINTMENT (OUTPATIENT)
Dept: CARDIOLOGY | Age: 80
End: 2020-03-19
Attending: INTERNAL MEDICINE

## 2020-04-06 RX ORDER — APIXABAN 5 MG/1
TABLET, FILM COATED ORAL
Qty: 60 TABLET | Refills: 5 | Status: SHIPPED | OUTPATIENT
Start: 2020-04-06 | End: 2020-07-20 | Stop reason: SDUPTHER

## 2020-04-17 ENCOUNTER — TELEPHONE (OUTPATIENT)
Dept: OPHTHALMOLOGY | Facility: CLINIC | Age: 80
End: 2020-04-17

## 2020-04-17 NOTE — TELEPHONE ENCOUNTER
Kaylan Davis from health  follow up study calling regarding the pt for [place] . He states he will contact you when the office opens back open information needed is at office and no access please advise       [email or web address] [email or web address]

## 2020-06-01 RX ORDER — LATANOPROST 50 UG/ML
SOLUTION/ DROPS OPHTHALMIC
Qty: 3 BOTTLE | Refills: 3 | Status: SHIPPED | OUTPATIENT
Start: 2020-06-01 | End: 2021-03-29

## 2020-06-01 NOTE — TELEPHONE ENCOUNTER
LDE: 12/17/19  Last visit: 12/17/19   Due for: IOP check   Upcoming visit: 6/2/2020     Routed to [facility] [place]

## 2020-06-02 ENCOUNTER — OFFICE VISIT (OUTPATIENT)
Dept: OPHTHALMOLOGY | Facility: CLINIC | Age: 80
End: 2020-06-02
Payer: MEDICARE

## 2020-06-02 DIAGNOSIS — H40.1131 PRIMARY OPEN ANGLE GLAUCOMA (POAG) OF BOTH EYES, MILD STAGE: Primary | ICD-10-CM

## 2020-06-02 PROCEDURE — 99213 OFFICE O/P EST LOW 20 MIN: CPT | Performed by: OPHTHALMOLOGY

## 2020-06-02 PROCEDURE — G0463 HOSPITAL OUTPT CLINIC VISIT: HCPCS | Performed by: OPHTHALMOLOGY

## 2020-06-02 NOTE — PROGRESS NOTES
Zahida Carrera is a 79year old male. HPI:     HPI     Patient is here for an IOP check. Patient is taking Latanoprost OU QHS. Patient states his vision is stable.       Last edited by Clifford Pierre OT on 6/2/2020 10:26 AM. 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 Will continue same regimen of Latanoprost in both eyes at bedtime.      Will have patient back in 4 months for a pressure check      No orders of the defin

## 2020-06-02 NOTE — PATIENT INSTRUCTIONS
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 Will continue same regimen of Latanoprost in both eyes at bedtime.      Will have patient back in 4 months for a pressure check

## 2020-06-02 NOTE — ASSESSMENT & PLAN NOTE
Intraocular pressure stable, tolerating medications. Will continue same regimen of Latanoprost in both eyes at bedtime.      Will have patient back in 4 months for a pressure check

## 2020-06-04 ENCOUNTER — APPOINTMENT (OUTPATIENT)
Dept: CARDIOLOGY | Age: 80
End: 2020-06-04
Attending: INTERNAL MEDICINE

## 2020-06-10 ENCOUNTER — ANCILLARY ORDERS (OUTPATIENT)
Dept: CARDIOLOGY | Age: 80
End: 2020-06-10

## 2020-06-10 ENCOUNTER — ANCILLARY PROCEDURE (OUTPATIENT)
Dept: CARDIOLOGY | Age: 80
End: 2020-06-10
Attending: INTERNAL MEDICINE

## 2020-06-10 DIAGNOSIS — Z45.018 ENCOUNTER FOR CARE OF PACEMAKER: ICD-10-CM

## 2020-06-10 PROCEDURE — X1114 CARDIAC DEVICE HOME CHECK - REMOTE UNSCHEDULED: HCPCS | Performed by: INTERNAL MEDICINE

## 2020-06-10 PROCEDURE — 93294 REM INTERROG EVL PM/LDLS PM: CPT | Performed by: INTERNAL MEDICINE

## 2020-07-14 RX ORDER — AMLODIPINE BESYLATE 5 MG/1
5 TABLET ORAL DAILY
COMMUNITY
Start: 2020-03-11 | End: 2021-03-06

## 2020-07-20 ENCOUNTER — ANCILLARY PROCEDURE (OUTPATIENT)
Dept: CARDIOLOGY | Age: 80
End: 2020-07-20
Attending: INTERNAL MEDICINE

## 2020-07-20 ENCOUNTER — OFFICE VISIT (OUTPATIENT)
Dept: CARDIOLOGY | Age: 80
End: 2020-07-20

## 2020-07-20 VITALS
HEIGHT: 69 IN | HEART RATE: 72 BPM | WEIGHT: 224 LBS | RESPIRATION RATE: 14 BRPM | BODY MASS INDEX: 33.18 KG/M2 | DIASTOLIC BLOOD PRESSURE: 60 MMHG | SYSTOLIC BLOOD PRESSURE: 146 MMHG

## 2020-07-20 VITALS
BODY MASS INDEX: 33.08 KG/M2 | DIASTOLIC BLOOD PRESSURE: 60 MMHG | RESPIRATION RATE: 14 BRPM | SYSTOLIC BLOOD PRESSURE: 146 MMHG | HEART RATE: 72 BPM | WEIGHT: 224 LBS

## 2020-07-20 DIAGNOSIS — E78.5 DYSLIPIDEMIA: ICD-10-CM

## 2020-07-20 DIAGNOSIS — I25.10 NONOCCLUSIVE CORONARY ATHEROSCLEROSIS OF NATIVE CORONARY ARTERY: Primary | ICD-10-CM

## 2020-07-20 DIAGNOSIS — I48.0 PAROXYSMAL ATRIAL FIBRILLATION (CMD): ICD-10-CM

## 2020-07-20 DIAGNOSIS — I10 ESSENTIAL HYPERTENSION: ICD-10-CM

## 2020-07-20 DIAGNOSIS — Z95.0 PACEMAKER: ICD-10-CM

## 2020-07-20 DIAGNOSIS — I44.2 THIRD DEGREE ATRIOVENTRICULAR BLOCK (CMD): ICD-10-CM

## 2020-07-20 DIAGNOSIS — I25.10 CAD (CORONARY ARTERY DISEASE): Primary | ICD-10-CM

## 2020-07-20 PROCEDURE — 99214 OFFICE O/P EST MOD 30 MIN: CPT | Performed by: INTERNAL MEDICINE

## 2020-07-20 PROCEDURE — 93280 PM DEVICE PROGR EVAL DUAL: CPT | Performed by: INTERNAL MEDICINE

## 2020-07-20 RX ORDER — LISINOPRIL 20 MG/1
TABLET ORAL
Qty: 90 TABLET | Refills: 0 | Status: SHIPPED | OUTPATIENT
Start: 2020-07-20 | End: 2021-01-28

## 2020-07-20 ASSESSMENT — PATIENT HEALTH QUESTIONNAIRE - PHQ9
SUM OF ALL RESPONSES TO PHQ9 QUESTIONS 1 AND 2: 0
2. FEELING DOWN, DEPRESSED OR HOPELESS: NOT AT ALL
1. LITTLE INTEREST OR PLEASURE IN DOING THINGS: NOT AT ALL
SUM OF ALL RESPONSES TO PHQ9 QUESTIONS 1 AND 2: 0
CLINICAL INTERPRETATION OF PHQ2 SCORE: NO FURTHER SCREENING NEEDED
CLINICAL INTERPRETATION OF PHQ9 SCORE: NO FURTHER SCREENING NEEDED

## 2020-07-22 PROCEDURE — 93280 PM DEVICE PROGR EVAL DUAL: CPT | Performed by: INTERNAL MEDICINE

## 2020-08-01 ENCOUNTER — LAB ENCOUNTER (OUTPATIENT)
Dept: LAB | Facility: HOSPITAL | Age: 80
End: 2020-08-01
Attending: INTERNAL MEDICINE
Payer: MEDICARE

## 2020-08-01 DIAGNOSIS — I44.2 THIRD DEGREE HEART BLOCK (HCC): ICD-10-CM

## 2020-08-01 DIAGNOSIS — E78.5 HYPERLIPEMIA: ICD-10-CM

## 2020-08-01 DIAGNOSIS — I48.0 PAROXYSMAL ATRIAL FIBRILLATION (HCC): ICD-10-CM

## 2020-08-01 DIAGNOSIS — Z95.0 PACEMAKER: ICD-10-CM

## 2020-08-01 DIAGNOSIS — I25.10 CORONARY ATHEROSCLEROSIS OF NATIVE CORONARY ARTERY: ICD-10-CM

## 2020-08-01 DIAGNOSIS — I10 ESSENTIAL HYPERTENSION, MALIGNANT: Primary | ICD-10-CM

## 2020-08-01 LAB
ALBUMIN SERPL-MCNC: 3.9 G/DL
ALBUMIN SERPL-MCNC: 3.9 G/DL (ref 3.4–5)
ALBUMIN/GLOB SERPL: 1.1 {RATIO}
ALBUMIN/GLOB SERPL: 1.1 {RATIO} (ref 1–2)
ALP LIVER SERPL-CCNC: 70 U/L (ref 45–117)
ALP SERPL-CCNC: 70 U/L
ALT SERPL-CCNC: 26 U/L (ref 16–61)
ALT SERPL-CCNC: 26 UNITS/L
ANION GAP SERPL CALC-SCNC: 3 MMOL/L
ANION GAP SERPL CALC-SCNC: 3 MMOL/L (ref 0–18)
AST SERPL-CCNC: 21 U/L (ref 15–37)
AST SERPL-CCNC: 21 UNITS/L
BASOPHILS # BLD AUTO: 0.02 X10(3) UL (ref 0–0.2)
BASOPHILS NFR BLD AUTO: 0.5 %
BILIRUB SERPL-MCNC: 0.8 MG/DL
BILIRUB SERPL-MCNC: 0.8 MG/DL (ref 0.1–2)
BUN BLD-MCNC: 31 MG/DL (ref 7–18)
BUN SERPL-MCNC: 31 MG/DL
BUN/CREAT SERPL: 24.8
BUN/CREAT SERPL: 24.8 (ref 10–20)
CALCIUM BLD-MCNC: 9.4 MG/DL (ref 8.5–10.1)
CALCIUM SERPL-MCNC: 9.4 MG/DL
CHLORIDE SERPL-SCNC: 114 MMOL/L
CHLORIDE SERPL-SCNC: 114 MMOL/L (ref 98–112)
CO2 SERPL-SCNC: 29 MMOL/L
CO2 SERPL-SCNC: 29 MMOL/L (ref 21–32)
CREAT BLD-MCNC: 1.25 MG/DL (ref 0.7–1.3)
CREAT SERPL-MCNC: 1.25 MG/DL
DEPRECATED RDW RBC AUTO: 46.7 FL (ref 35.1–46.3)
EOSINOPHIL # BLD AUTO: 0.14 X10(3) UL (ref 0–0.7)
EOSINOPHIL NFR BLD AUTO: 3.2 %
ERYTHROCYTE [DISTWIDTH] IN BLOOD BY AUTOMATED COUNT: 12.3 % (ref 11–15)
ERYTHROCYTE [DISTWIDTH] IN BLOOD BY AUTOMATED COUNT: 46.7 %
GLOBULIN PLAS-MCNC: 3.5 G/DL (ref 2.8–4.4)
GLOBULIN SER-MCNC: 3.5 G/DL
GLUCOSE BLD-MCNC: 92 MG/DL (ref 70–99)
GLUCOSE SERPL-MCNC: 92 MG/DL
HCT VFR BLD AUTO: 41.6 % (ref 39–53)
HCT VFR BLD CALC: 41.6 %
HGB BLD-MCNC: 13.6 G/DL
HGB BLD-MCNC: 13.6 G/DL (ref 13–17.5)
IMM GRANULOCYTES # BLD AUTO: 0 X10(3) UL (ref 0–1)
IMM GRANULOCYTES NFR BLD: 0 %
LENGTH OF FAST TIME PATIENT: YES H
LYMPHOCYTES # BLD AUTO: 0.7 X10(3) UL (ref 1–4)
LYMPHOCYTES NFR BLD AUTO: 16.2 %
M PROTEIN MFR SERPL ELPH: 7.4 G/DL (ref 6.4–8.2)
MCH RBC QN AUTO: 33.7 PG
MCH RBC QN AUTO: 33.7 PG (ref 26–34)
MCHC RBC AUTO-ENTMCNC: 32.7 G/DL
MCHC RBC AUTO-ENTMCNC: 32.7 G/DL (ref 31–37)
MCV RBC AUTO: 103.2 FL
MCV RBC AUTO: 103.2 FL (ref 80–100)
MONOCYTES # BLD AUTO: 0.6 X10(3) UL (ref 0.1–1)
MONOCYTES NFR BLD AUTO: 13.9 %
NEUTROPHILS # BLD AUTO: 2.87 X10 (3) UL (ref 1.5–7.7)
NEUTROPHILS # BLD AUTO: 2.87 X10(3) UL (ref 1.5–7.7)
NEUTROPHILS NFR BLD AUTO: 66.2 %
OSMOLALITY SERPL CALC.SUM OF ELEC: 308 MOSM/KG (ref 275–295)
PATIENT FASTING Y/N/NP: YES
PLATELET # BLD AUTO: 173 10(3)UL (ref 150–450)
PLATELET # BLD: 173 K/MCL
POTASSIUM SERPL-SCNC: 4.2 MMOL/L
POTASSIUM SERPL-SCNC: 4.2 MMOL/L (ref 3.5–5.1)
PROT SERPL-MCNC: 7.4 G/DL
RBC # BLD AUTO: 4.03 X10(6)UL (ref 3.8–5.8)
RBC # BLD: 4.03 10*6/UL
SODIUM SERPL-SCNC: 146 MMOL/L
SODIUM SERPL-SCNC: 146 MMOL/L (ref 136–145)
WBC # BLD AUTO: 4.3 X10(3) UL (ref 4–11)
WBC # BLD: 4.3 K/MCL

## 2020-08-01 PROCEDURE — 85025 COMPLETE CBC W/AUTO DIFF WBC: CPT

## 2020-08-01 PROCEDURE — 36415 COLL VENOUS BLD VENIPUNCTURE: CPT

## 2020-08-01 PROCEDURE — 80053 COMPREHEN METABOLIC PANEL: CPT

## 2020-08-03 ENCOUNTER — CLINICAL ABSTRACT (OUTPATIENT)
Dept: CARDIOLOGY | Age: 80
End: 2020-08-03

## 2020-08-04 ENCOUNTER — TELEPHONE (OUTPATIENT)
Dept: CARDIOLOGY | Age: 80
End: 2020-08-04

## 2020-08-15 ENCOUNTER — APPOINTMENT (OUTPATIENT)
Dept: GENERAL RADIOLOGY | Age: 80
End: 2020-08-15
Attending: EMERGENCY MEDICINE
Payer: MEDICARE

## 2020-08-15 ENCOUNTER — HOSPITAL ENCOUNTER (OUTPATIENT)
Age: 80
Discharge: HOME | End: 2020-08-15
Attending: EMERGENCY MEDICINE
Payer: MEDICARE

## 2020-08-15 VITALS
RESPIRATION RATE: 18 BRPM | DIASTOLIC BLOOD PRESSURE: 64 MMHG | HEART RATE: 102 BPM | SYSTOLIC BLOOD PRESSURE: 143 MMHG | OXYGEN SATURATION: 98 % | TEMPERATURE: 98 F

## 2020-08-15 DIAGNOSIS — S22.42XA CLOSED FRACTURE OF MULTIPLE RIBS OF LEFT SIDE, INITIAL ENCOUNTER: Primary | ICD-10-CM

## 2020-08-15 DIAGNOSIS — S62.524A CLOSED NONDISPLACED FRACTURE OF DISTAL PHALANX OF RIGHT THUMB, INITIAL ENCOUNTER: ICD-10-CM

## 2020-08-15 PROCEDURE — 71046 X-RAY EXAM CHEST 2 VIEWS: CPT | Performed by: EMERGENCY MEDICINE

## 2020-08-15 PROCEDURE — 26750 TREAT FINGER FRACTURE EACH: CPT

## 2020-08-15 PROCEDURE — 71111 X-RAY EXAM RIBS/CHEST4/> VWS: CPT | Performed by: EMERGENCY MEDICINE

## 2020-08-15 PROCEDURE — 73140 X-RAY EXAM OF FINGER(S): CPT | Performed by: EMERGENCY MEDICINE

## 2020-08-15 PROCEDURE — 73560 X-RAY EXAM OF KNEE 1 OR 2: CPT | Performed by: EMERGENCY MEDICINE

## 2020-08-15 PROCEDURE — 99214 OFFICE O/P EST MOD 30 MIN: CPT

## 2020-08-15 PROCEDURE — 71120 X-RAY EXAM BREASTBONE 2/>VWS: CPT | Performed by: EMERGENCY MEDICINE

## 2020-08-15 RX ORDER — TRAMADOL HYDROCHLORIDE 50 MG/1
TABLET ORAL EVERY 6 HOURS PRN
Qty: 20 TABLET | Refills: 0 | Status: SHIPPED | OUTPATIENT
Start: 2020-08-15 | End: 2020-08-25

## 2020-08-15 NOTE — ED PROVIDER NOTES
Patient Seen in: 5 [facility]      History   Patient presents with:  Fall    Stated Complaint: fall    HPI    80 yo male tripped at the bottom of a staircase and fell down one step.  He was carrying cases of water and fell f Review of Systems    Positive for stated complaint: fall  Other systems are as noted in HPI. Constitutional and vital signs reviewed. All other systems reviewed and negative except as noted above.     Physical Exam     ED Triage Vitals [08/15/20 normal.              ED Course   Labs Reviewed - No data to display               MDM       xrays reviewed. Fracture of left thumb distal phalynx as left 7th and 8th ribs.              Disposition and Plan     Clinical Impression:  Closed fracture of multip

## 2020-08-15 NOTE — ED INITIAL ASSESSMENT (HPI)
PATIENT ARRIVED AMBULATORY TO ROOM WITH SON. PATIENT SUSTAINED A FALL 3 DAYS AGO. PATIENT STATES HE WAS WALKING DOWN STAIRS CARRYING SOFT DRINKS, TRIPPED AND FELL. PATIENT DENIES HITTING HIS HEAD AT THE TIME. PATIENT C/O LEFT KNEE PAIN/SWELLING.  PATIENT AL

## 2020-08-19 ENCOUNTER — TELEPHONE (OUTPATIENT)
Dept: INTERNAL MEDICINE CLINIC | Facility: CLINIC | Age: 80
End: 2020-08-19

## 2020-08-19 NOTE — TELEPHONE ENCOUNTER
Son reports patient seen in [facility] 8/15 after a fall. Reports he hit his knee, chest and ribs with fall. X-ray confirmed broken ribs, his sternum is bruised and continues with knee pain.  Reports left knee has been getting worse, has pain, inflammation, swellin

## 2020-08-20 ENCOUNTER — OFFICE VISIT (OUTPATIENT)
Dept: INTERNAL MEDICINE CLINIC | Facility: CLINIC | Age: 80
End: 2020-08-20
Payer: MEDICARE

## 2020-08-20 VITALS — HEART RATE: 93 BPM | DIASTOLIC BLOOD PRESSURE: 64 MMHG | SYSTOLIC BLOOD PRESSURE: 119 MMHG

## 2020-08-20 DIAGNOSIS — S81.012S LACERATION OF LEFT KNEE, SEQUELA: ICD-10-CM
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
## 2020-08-20 DIAGNOSIS — S70.12XS HEMATOMA OF LEFT THIGH, SEQUELA: ICD-10-CM

## 2020-08-20 PROCEDURE — G0463 HOSPITAL OUTPT CLINIC VISIT: HCPCS | Performed by: INTERNAL MEDICINE

## 2020-08-20 PROCEDURE — 99214 OFFICE O/P EST MOD 30 MIN: CPT | Performed by: INTERNAL MEDICINE

## 2020-08-20 RX ORDER — SULFAMETHOXAZOLE AND TRIMETHOPRIM 800; 160 MG/1; MG/1
1 TABLET ORAL 2 TIMES DAILY
Qty: 14 TABLET | Refills: 0 | Status: SHIPPED | OUTPATIENT
Start: 2020-08-20 | End: 2020-08-27

## 2020-08-20 RX ORDER — AMOXICILLIN 250 MG
1 CAPSULE ORAL DAILY
Qty: 30 TABLET | Refills: 0 | Status: SHIPPED | OUTPATIENT
Start: 2020-08-20 | End: 2020-09-19

## 2020-08-20 RX ORDER — ACETAMINOPHEN AND CODEINE PHOSPHATE 300; 30 MG/1; MG/1
1 TABLET ORAL EVERY 12 HOURS PRN
Qty: 30 TABLET | Refills: 0 | Status: SHIPPED | OUTPATIENT
Start: 2020-08-20 | End: 2020-09-04

## 2020-08-20 NOTE — PROGRESS NOTES
Rachel Gardner is a 79year old male. Patient presents with:  Fall: pt fell one week ago, went to urgent care, c/o L-knee pain, has wound  Medication Question: tramadol is making him dizzy, would like a different pain med.     HPI:   59-year-old gentleman h 50 MG Oral Tab Take 1-2 tablets ( mg total) by mouth every 6 (six) hours as needed for Pain.  (Patient not taking: Reported on 8/20/2020 ) 20 tablet 0      Past Medical History:   Diagnosis Date   • Aortic atherosclerosis ([facility] [address]     CXR 5-14   • ASHD ( Systems   Constitutional: Negative for activity change, appetite change and fever. HENT: Negative for congestion and voice change. Respiratory: Negative for cough and shortness of breath. Cardiovascular: Positive for leg swelling.  Negative for ches him to elevate his legs up as much as possible. I have informed him to measure the diameter of the thigh and the calf every day. If it is getting worse, he should call me or go to the emergency room. For the laceration, he is up-to-date on Tdap.   Provid

## 2020-08-25 ENCOUNTER — LAB ENCOUNTER (OUTPATIENT)
Dept: LAB | Facility: HOSPITAL | Age: 80
End: 2020-08-25
Attending: INTERNAL MEDICINE
Payer: MEDICARE

## 2020-08-25 ENCOUNTER — OFFICE VISIT (OUTPATIENT)
Dept: INTERNAL MEDICINE CLINIC | Facility: CLINIC | Age: 80
End: 2020-08-25
Payer: MEDICARE

## 2020-08-25 VITALS
SYSTOLIC BLOOD PRESSURE: 136 MMHG | HEART RATE: 77 BPM | BODY MASS INDEX: 33 KG/M2 | RESPIRATION RATE: 16 BRPM | DIASTOLIC BLOOD PRESSURE: 73 MMHG | HEIGHT: 69 IN

## 2020-08-25 DIAGNOSIS — S70.11XD HEMATOMA OF RIGHT THIGH, SUBSEQUENT ENCOUNTER: Primary | ICD-10-CM

## 2020-08-25 DIAGNOSIS — S22.42XD CLOSED FRACTURE OF MULTIPLE RIBS OF LEFT SIDE WITH ROUTINE HEALING, SUBSEQUENT ENCOUNTER: ICD-10-CM

## 2020-08-25 DIAGNOSIS — S70.11XD HEMATOMA OF RIGHT THIGH, SUBSEQUENT ENCOUNTER: ICD-10-CM

## 2020-08-25 LAB
DEPRECATED RDW RBC AUTO: 45.7 FL (ref 35.1–46.3)
ERYTHROCYTE [DISTWIDTH] IN BLOOD BY AUTOMATED COUNT: 12.3 % (ref 11–15)
HCT VFR BLD AUTO: 28.7 % (ref 39–53)
HGB BLD-MCNC: 9.4 G/DL (ref 13–17.5)
MCH RBC QN AUTO: 33.3 PG (ref 26–34)
MCHC RBC AUTO-ENTMCNC: 32.8 G/DL (ref 31–37)
MCV RBC AUTO: 101.8 FL (ref 80–100)
PLATELET # BLD AUTO: 316 10(3)UL (ref 150–450)
RBC # BLD AUTO: 2.82 X10(6)UL (ref 3.8–5.8)
WBC # BLD AUTO: 4.9 X10(3) UL (ref 4–11)

## 2020-08-25 PROCEDURE — 85027 COMPLETE CBC AUTOMATED: CPT

## 2020-08-25 PROCEDURE — G0463 HOSPITAL OUTPT CLINIC VISIT: HCPCS | Performed by: INTERNAL MEDICINE

## 2020-08-25 PROCEDURE — 99213 OFFICE O/P EST LOW 20 MIN: CPT | Performed by: INTERNAL MEDICINE

## 2020-08-25 PROCEDURE — 36415 COLL VENOUS BLD VENIPUNCTURE: CPT

## 2020-08-25 NOTE — PATIENT INSTRUCTIONS
Await results of CBC. Stop Bactrim. Stop Tylenol #3 and transition to acetaminophen. You may stop your stool softener as well. Please send me a MyChart message in about 7-10 days about your progress. Medicare physical in 6 months.

## 2020-08-25 NOTE — PROGRESS NOTES
Helder French is a 79year old male. Patient presents with:   Follow - Up: pt f/u after fall, he is f/u Dr. Kaleb Bartlett and was prescribed medication which he has concerned     HPI:   Dr. 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 Of note, earlier this month, prior to his fall, CMP normal except BUN 31 creatinine 1.25 (improved), and CBC normal except .2.   Current Outpatient Medications   Medication Sig Dispense R with peak pulmonary artery pressure 38 mmHg   • Paroxysmal atrial fibrillation (HCC)    • Pulmonary hypertension (HCC)     Echo 7-18 with peak pulmonary artery pressure 38 mmHg   • Second degree AV block     Status post pacemaker placement 5-14     Past Estes transitioning from Tylenol #3 to acetaminophen. May stop stool softener as opioid now stopped. Monitor closely to ensure further healing and resolution. Recommended that he send me a MyChart message in 7-10 days regarding progress.   Return visit in 6 mo

## 2020-08-29 RX ORDER — AMLODIPINE BESYLATE 5 MG/1
5 TABLET ORAL DAILY
Qty: 90 TABLET | Refills: 1 | Status: SHIPPED | OUTPATIENT
Start: 2020-08-29 | End: 2021-02-08

## 2020-08-29 RX ORDER — ATORVASTATIN CALCIUM 20 MG/1
20 TABLET, FILM COATED ORAL DAILY
Qty: 90 TABLET | Refills: 1 | Status: SHIPPED | OUTPATIENT
Start: 2020-08-29 | End: 2021-02-08

## 2020-09-10 ENCOUNTER — TELEPHONE (OUTPATIENT)
Dept: INTERNAL MEDICINE CLINIC | Facility: CLINIC | Age: 80
End: 2020-09-10

## 2020-09-10 NOTE — TELEPHONE ENCOUNTER
I am glad that he is improving, although I which it were happening faster. If he wishes, I can certainly enter an order for physical therapy and he can then schedule it.

## 2020-09-10 NOTE — TELEPHONE ENCOUNTER
Message routed to provider to review patient's message from below and to confirm the plan of care.      Please reply to pool: SAMIRA Wheat                 ----- Message from Omero Ybarra sent at 9/10/2020 10:40 AM CDT -----  Regarding: Other  Contact: 6

## 2020-09-10 NOTE — TELEPHONE ENCOUNTER
Spoke with patient. He will continue to monitor his progress and call us back in approximately two weeks if he wishes to proceed with physical therapy.

## 2020-09-21 ENCOUNTER — ANCILLARY PROCEDURE (OUTPATIENT)
Dept: CARDIOLOGY | Age: 80
End: 2020-09-21
Attending: INTERNAL MEDICINE

## 2020-09-21 DIAGNOSIS — Z95.0 CARDIAC PACEMAKER: ICD-10-CM

## 2020-11-11 ENCOUNTER — OFFICE VISIT (OUTPATIENT)
Dept: OPHTHALMOLOGY | Facility: CLINIC | Age: 80
End: 2020-11-11
Payer: MEDICARE

## 2020-11-11 DIAGNOSIS — H40.1131 PRIMARY OPEN ANGLE GLAUCOMA (POAG) OF BOTH EYES, MILD STAGE: Primary | ICD-10-CM

## 2020-11-11 PROBLEM — H40.1121 PRIMARY OPEN-ANGLE GLAUCOMA, LEFT EYE, MILD STAGE: Status: RESOLVED | Noted: 2018-04-10 | Resolved: 2020-11-11

## 2020-11-11 PROCEDURE — G0463 HOSPITAL OUTPT CLINIC VISIT: HCPCS | Performed by: OPHTHALMOLOGY

## 2020-11-11 PROCEDURE — 99213 OFFICE O/P EST LOW 20 MIN: CPT | Performed by: OPHTHALMOLOGY

## 2020-11-11 NOTE — PATIENT INSTRUCTIONS
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 Will continue same regimen of Latanoprost in both eyes at bedtime.      Will have patient back in 4 months for a visual field, OCT and dilated

## 2020-11-11 NOTE — PROGRESS NOTES
Claudette Zafar is a 79year old male. HPI:     HPI     Here for an IOP check. Pt has been taking Latanoprost OU qhs as directed. Pt states that his vision is stable but occasionally takes his glasses off when looking at the computer to see better. 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   Will continue same regimen of Latanoprost in both eyes

## 2020-11-11 NOTE — ASSESSMENT & PLAN NOTE
Intraocular pressure stable, tolerating medications. Will continue same regimen of Latanoprost in both eyes at bedtime.      Will have patient back in 4 months for a visual field, OCT and dilated eye exam.

## 2020-12-09 ENCOUNTER — MED REC SCAN ONLY (OUTPATIENT)
Dept: INTERNAL MEDICINE CLINIC | Facility: CLINIC | Age: 80
End: 2020-12-09

## 2020-12-28 ENCOUNTER — ANCILLARY PROCEDURE (OUTPATIENT)
Dept: CARDIOLOGY | Age: 80
End: 2020-12-28
Attending: INTERNAL MEDICINE

## 2020-12-28 ENCOUNTER — ANCILLARY ORDERS (OUTPATIENT)
Dept: CARDIOLOGY | Age: 80
End: 2020-12-28

## 2020-12-28 DIAGNOSIS — Z95.0 CARDIAC PACEMAKER: ICD-10-CM

## 2020-12-28 PROCEDURE — X1114 CARDIAC DEVICE HOME CHECK - REMOTE UNSCHEDULED: HCPCS | Performed by: INTERNAL MEDICINE

## 2021-01-28 RX ORDER — LISINOPRIL 20 MG/1
TABLET ORAL
Qty: 90 TABLET | Refills: 0 | Status: SHIPPED | OUTPATIENT
Start: 2021-01-28 | End: 2021-04-13

## 2021-02-01 ENCOUNTER — LAB ENCOUNTER (OUTPATIENT)
Dept: LAB | Facility: HOSPITAL | Age: 81
End: 2021-02-01
Attending: INTERNAL MEDICINE
Payer: MEDICARE

## 2021-02-01 DIAGNOSIS — Z95.0 PACEMAKER: Primary | ICD-10-CM

## 2021-02-01 DIAGNOSIS — I44.2 THIRD DEGREE ATRIOVENTRICULAR BLOCK (HCC): ICD-10-CM

## 2021-02-01 DIAGNOSIS — E78.5 DYSLIPIDEMIA: ICD-10-CM

## 2021-02-01 DIAGNOSIS — I10 ESSENTIAL HYPERTENSION: ICD-10-CM

## 2021-02-01 DIAGNOSIS — I25.10 CORONARY ATHEROSCLEROSIS OF NATIVE CORONARY ARTERY: ICD-10-CM

## 2021-02-01 DIAGNOSIS — I48.0 PAROXYSMAL ATRIAL FIBRILLATION (HCC): ICD-10-CM

## 2021-02-01 LAB
BASOPHILS # BLD AUTO: 0.03 X10(3) UL (ref 0–0.2)
BASOPHILS NFR BLD AUTO: 0.6 %
DEPRECATED RDW RBC AUTO: 49 FL (ref 35.1–46.3)
EOSINOPHIL # BLD AUTO: 0.11 X10(3) UL (ref 0–0.7)
EOSINOPHIL NFR BLD AUTO: 2.3 %
ERYTHROCYTE [DISTWIDTH] IN BLOOD BY AUTOMATED COUNT: 13 % (ref 11–15)
HCT VFR BLD AUTO: 41.1 %
HCT VFR BLD CALC: 41.1 %
HGB BLD-MCNC: 13.3 G/DL
HGB BLD-MCNC: 13.3 G/DL
IMM GRANULOCYTES # BLD AUTO: 0.01 X10(3) UL (ref 0–1)
IMM GRANULOCYTES NFR BLD: 0.2 %
LYMPHOCYTES # BLD AUTO: 0.75 X10(3) UL (ref 1–4)
LYMPHOCYTES NFR BLD AUTO: 15.5 %
MCH RBC QN AUTO: 32.8 PG (ref 26–34)
MCHC RBC AUTO-ENTMCNC: 32.4 G/DL (ref 31–37)
MCV RBC AUTO: 101.5 FL
MONOCYTES # BLD AUTO: 0.6 X10(3) UL (ref 0.1–1)
MONOCYTES NFR BLD AUTO: 12.4 %
NEUTROPHILS # BLD AUTO: 3.34 X10 (3) UL (ref 1.5–7.7)
NEUTROPHILS # BLD AUTO: 3.34 X10(3) UL (ref 1.5–7.7)
NEUTROPHILS NFR BLD AUTO: 69 %
PLATELET # BLD AUTO: 175 10(3)UL (ref 150–450)
PLATELET # BLD: 175 K/MCL
RBC # BLD AUTO: 4.05 X10(6)UL
RBC # BLD: 4.05 10*6/UL
WBC # BLD AUTO: 4.8 X10(3) UL (ref 4–11)
WBC # BLD: 4.8 K/MCL

## 2021-02-01 PROCEDURE — 85025 COMPLETE CBC W/AUTO DIFF WBC: CPT

## 2021-02-01 PROCEDURE — 36415 COLL VENOUS BLD VENIPUNCTURE: CPT

## 2021-02-02 ENCOUNTER — CLINICAL ABSTRACT (OUTPATIENT)
Dept: CARDIOLOGY | Age: 81
End: 2021-02-02

## 2021-02-02 ENCOUNTER — TELEPHONE (OUTPATIENT)
Dept: CARDIOLOGY | Age: 81
End: 2021-02-02

## 2021-02-03 NOTE — TELEPHONE ENCOUNTER
Spoke to Patient. He will call back tomorrow to schedule.  Waiting to see if the Note  wrote will be enough to excuse him from [facility]

## 2021-02-08 RX ORDER — ATORVASTATIN CALCIUM 20 MG/1
20 TABLET, FILM COATED ORAL DAILY
Qty: 90 TABLET | Refills: 0 | Status: SHIPPED | OUTPATIENT
Start: 2021-02-08 | End: 2021-05-03

## 2021-02-08 RX ORDER — AMLODIPINE BESYLATE 5 MG/1
5 TABLET ORAL DAILY
Qty: 90 TABLET | Refills: 0 | Status: SHIPPED | OUTPATIENT
Start: 2021-02-08 | End: 2021-05-03

## 2021-02-09 ENCOUNTER — OFFICE VISIT (OUTPATIENT)
Dept: INTERNAL MEDICINE CLINIC | Facility: CLINIC | Age: 81
End: 2021-02-09
Payer: MEDICARE

## 2021-02-09 VITALS
SYSTOLIC BLOOD PRESSURE: 134 MMHG | HEART RATE: 89 BPM | WEIGHT: 225.69 LBS | BODY MASS INDEX: 33.43 KG/M2 | DIASTOLIC BLOOD PRESSURE: 64 MMHG | HEIGHT: 69 IN

## 2021-02-09 DIAGNOSIS — Z00.00 ENCOUNTER FOR ANNUAL HEALTH EXAMINATION: ICD-10-CM

## 2021-02-09 DIAGNOSIS — I44.1 SECOND DEGREE AV BLOCK: ICD-10-CM

## 2021-02-09 DIAGNOSIS — N18.30 STAGE 3 CHRONIC KIDNEY DISEASE, UNSPECIFIED WHETHER STAGE 3A OR 3B CKD (HCC): ICD-10-CM

## 2021-02-09 DIAGNOSIS — I10 ESSENTIAL HYPERTENSION: ICD-10-CM

## 2021-02-09 DIAGNOSIS — I70.0 AORTIC ATHEROSCLEROSIS (HCC): ICD-10-CM

## 2021-02-09 DIAGNOSIS — I25.10 ASHD (ARTERIOSCLEROTIC HEART DISEASE): ICD-10-CM

## 2021-02-09 DIAGNOSIS — E78.00 HYPERCHOLESTEROLEMIA: ICD-10-CM

## 2021-02-09 DIAGNOSIS — I27.20 PULMONARY HYPERTENSION (HCC): ICD-10-CM

## 2021-02-09 DIAGNOSIS — I48.0 PAROXYSMAL ATRIAL FIBRILLATION (HCC): ICD-10-CM

## 2021-02-09 DIAGNOSIS — Z00.00 ANNUAL PHYSICAL EXAM: Primary | ICD-10-CM

## 2021-02-09 PROCEDURE — G0439 PPPS, SUBSEQ VISIT: HCPCS | Performed by: INTERNAL MEDICINE

## 2021-02-09 PROCEDURE — 99213 OFFICE O/P EST LOW 20 MIN: CPT | Performed by: INTERNAL MEDICINE

## 2021-02-09 NOTE — PATIENT INSTRUCTIONS
Please come in for blood tests soon when you can. Continue current medications. Please get 2 doses of Shingrix vaccine at your pharmacy. Continue healthy diet and stay physically active as much as you can. Return visit in 6 months.   [place] [facility]'s S the following criteria:   • Men who are 73-68 years old and have smoked more than 100 cigarettes in their lifetime   • Anyone with a family history    Colorectal Cancer Screening Covered up to Age 76     Colonoscopy Screen   Covered every 10 years- more of previous visit.  Medium/high risk factors:   End-stage renal disease   Hemophiliacs who received Factor VIII or IX concentrates   Clients of institutions for the mentally retarded   Persons who live in the same house as a HepB virus carrier   Homosexual men

## 2021-02-09 NOTE — PROGRESS NOTES
HPI:   Naomie Schirmer is an [de-identified]year old male who presents this morning for a Medicare Subsequent Annual Wellness visit (Pt already had Initial Annual Wellness). His last Medicare physical was January 2020. He feels well today.   He has completely recov 0-No  Are you on multiple medications?: 1-Yes  Does pain affect your day to day activities?: 1-Yes  Have you had any memory issues?: 0-No  Fall/Risk Scorin  Scoring Interpretation: 4+ At Risk      Cognitive Assessment   He had a completely normal cogni both eyes, mild stage     Essential hypertension    Wt Readings from Last 3 Encounters:  02/09/21 : 225 lb 11.2 oz (102.4 kg)  01/10/20 : 220 lb 1.6 oz (99.8 kg)  06/04/19 : 220 lb (99.8 kg)     Last Cholesterol Labs:   Lab Results   Component Value Date placement (May 2014). His family history includes CVA in his mother; Colon Cancer in his paternal grandmother; Diabetes in his father and paternal aunt;  Hypertension in his mother; Lung Cancer in his paternal grandfather; Macular degeneration in his cou need to ask people to repeat themselves: No   I especially have trouble understanding the speech of women and children: No I have trouble understanding the speaker in a large room such as at a meeting or place of Judaism: No   Many people I talk to seem to 11/07/2017   • Influenza Vaccine, Preserv Free 09/25/2013   • Pneumococcal (Prevnar 13) 10/11/2016   • Pneumovax 23 11/01/2017        ASSESSMENT AND OTHER RELEVANT CHRONIC CONDITIONS:   Fadi Yap is a [de-identified]year old male who presents for a Medicare Asse Daily Walks  How would you describe your daily physical activity?: Light  How would you describe your current health state?: Fair  How do you maintain positive mental well-being?: Puzzles; Visiting Family    This section provided for quick review of chart, who received Factor VIII or IX concentrates   Clients of institutions for the mentally retarded   Persons who live in the same house as a HepB virus carrier   Homosexual men   Illicit injectable drug abusers     Tetanus Toxoid  Only covered with a cut with

## 2021-02-14 ENCOUNTER — LAB ENCOUNTER (OUTPATIENT)
Dept: LAB | Facility: HOSPITAL | Age: 81
End: 2021-02-14
Attending: INTERNAL MEDICINE
Payer: MEDICARE

## 2021-02-14 DIAGNOSIS — E78.00 HYPERCHOLESTEROLEMIA: ICD-10-CM

## 2021-02-14 LAB
ALBUMIN SERPL-MCNC: 4.1 G/DL (ref 3.4–5)
ALBUMIN/GLOB SERPL: 1.2 {RATIO} (ref 1–2)
ALP LIVER SERPL-CCNC: 91 U/L
ALT SERPL-CCNC: 25 U/L
ANION GAP SERPL CALC-SCNC: 3 MMOL/L (ref 0–18)
AST SERPL-CCNC: 21 U/L (ref 15–37)
BILIRUB SERPL-MCNC: 0.8 MG/DL (ref 0.1–2)
BUN BLD-MCNC: 34 MG/DL (ref 7–18)
BUN/CREAT SERPL: 21.5 (ref 10–20)
CALCIUM BLD-MCNC: 9.7 MG/DL (ref 8.5–10.1)
CHLORIDE SERPL-SCNC: 107 MMOL/L (ref 98–112)
CHOLEST SMN-MCNC: 204 MG/DL (ref ?–200)
CO2 SERPL-SCNC: 30 MMOL/L (ref 21–32)
CREAT BLD-MCNC: 1.58 MG/DL
GLOBULIN PLAS-MCNC: 3.5 G/DL (ref 2.8–4.4)
GLUCOSE BLD-MCNC: 105 MG/DL (ref 70–99)
HDLC SERPL-MCNC: 150 MG/DL (ref 40–59)
LDLC SERPL CALC-MCNC: 43 MG/DL (ref ?–100)
M PROTEIN MFR SERPL ELPH: 7.6 G/DL (ref 6.4–8.2)
NONHDLC SERPL-MCNC: 54 MG/DL (ref ?–130)
OSMOLALITY SERPL CALC.SUM OF ELEC: 298 MOSM/KG (ref 275–295)
PATIENT FASTING Y/N/NP: YES
PATIENT FASTING Y/N/NP: YES
POTASSIUM SERPL-SCNC: 5.1 MMOL/L (ref 3.5–5.1)
SODIUM SERPL-SCNC: 140 MMOL/L (ref 136–145)
TRIGL SERPL-MCNC: 54 MG/DL (ref 30–149)
TSI SER-ACNC: 1.2 MIU/ML (ref 0.36–3.74)
VLDLC SERPL CALC-MCNC: 11 MG/DL (ref 0–30)

## 2021-02-14 PROCEDURE — 36415 COLL VENOUS BLD VENIPUNCTURE: CPT

## 2021-02-14 PROCEDURE — 80053 COMPREHEN METABOLIC PANEL: CPT

## 2021-02-14 PROCEDURE — 84443 ASSAY THYROID STIM HORMONE: CPT

## 2021-02-14 PROCEDURE — 80061 LIPID PANEL: CPT

## 2021-02-15 NOTE — PROGRESS NOTES
Mau Pichardo is a 69year old male. 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 At his Medicare physical 1 month ago, dose of lisinopril was increased.     He has been checking his blood 28.00        Types: Cigarettes     Quit date: 1/1/1998  Smokeless tobacco: Never Used                      Alcohol use: Yes           0.0 oz/week     Comment: 2 glasses of wine most days       EXAM:   GENERAL: Pleasant male appearing well in no distress  B Statement Selected

## 2021-03-03 DIAGNOSIS — Z23 NEED FOR VACCINATION: ICD-10-CM

## 2021-03-09 ENCOUNTER — OFFICE VISIT (OUTPATIENT)
Dept: OPHTHALMOLOGY | Facility: CLINIC | Age: 81
End: 2021-03-09
Payer: MEDICARE

## 2021-03-09 DIAGNOSIS — H40.1131 PRIMARY OPEN ANGLE GLAUCOMA (POAG) OF BOTH EYES, MILD STAGE: Primary | ICD-10-CM

## 2021-03-09 DIAGNOSIS — H25.13 AGE-RELATED NUCLEAR CATARACT OF BOTH EYES: ICD-10-CM

## 2021-03-09 DIAGNOSIS — H43.393 VITREOUS FLOATERS OF BOTH EYES: ICD-10-CM

## 2021-03-09 PROCEDURE — 92014 COMPRE OPH EXAM EST PT 1/>: CPT | Performed by: OPHTHALMOLOGY

## 2021-03-09 PROCEDURE — 92133 CPTRZD OPH DX IMG PST SGM ON: CPT | Performed by: OPHTHALMOLOGY

## 2021-03-09 PROCEDURE — 92083 EXTENDED VISUAL FIELD XM: CPT | Performed by: OPHTHALMOLOGY

## 2021-03-09 NOTE — PROGRESS NOTES
Gerda Walsh is a [de-identified]year old male. HPI:     HPI     Patient is here for a VF, OCT and complete exam.  He is taking Latanoprost OU at bedtime as directed. Patient states his vision is stable.       Last edited by Tonja Marr OT on 3/9/2021  9:51 Alcohol/week: 0.0 standard drinks      Comment: 2 glasses of wine most days    Drug use: No      Medications:  Current Outpatient Medications   Medication Sig Dispense Refill   • ATORVASTATIN 20 MG Oral Tab TAKE 1 TABLET (20 MG TOTAL) BY MOUTH DAILY.  90 ta margin, Temporal crescent    C/D Ratio 0.7 0.6    Macula Normal Normal    Vessels Normal Normal    Periphery Normal Normal            Refraction     Wearing Rx       Sphere Cylinder Axis Add    Right -4.25 +0.75 180 +2.75    Left -2.50 Sphere  +2.75    Typ

## 2021-03-09 NOTE — PATIENT INSTRUCTIONS
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 Intraocular pressure stable, tolerating medications.   Will continue same re

## 2021-03-09 NOTE — ASSESSMENT & PLAN NOTE
Visual field and OCT completed in office today with stable results that were discussed with patient in office today. Intraocular pressure stable, tolerating medications. Will continue same regimen of Latanoprost in both eyes at bedtime.      Will have p

## 2021-03-29 NOTE — TELEPHONE ENCOUNTER
LDE: 3/9/21  Last visit: 3/9/21   Due for: IOP check   Upcoming visit: 7/8/21    Routed to [facility]

## 2021-03-30 RX ORDER — LATANOPROST 50 UG/ML
SOLUTION/ DROPS OPHTHALMIC
Qty: 3 BOTTLE | Refills: 3 | Status: SHIPPED | OUTPATIENT
Start: 2021-03-30 | End: 2021-11-29

## 2021-04-01 ENCOUNTER — ANCILLARY PROCEDURE (OUTPATIENT)
Dept: CARDIOLOGY | Age: 81
End: 2021-04-01
Attending: INTERNAL MEDICINE

## 2021-04-01 ENCOUNTER — TELEPHONE (OUTPATIENT)
Dept: CARDIOLOGY | Age: 81
End: 2021-04-01

## 2021-04-01 ENCOUNTER — ANCILLARY ORDERS (OUTPATIENT)
Dept: CARDIOLOGY | Age: 81
End: 2021-04-01

## 2021-04-01 DIAGNOSIS — Z45.018 ENCOUNTER FOR CARE OF PACEMAKER: ICD-10-CM

## 2021-04-01 PROCEDURE — X1114 CARDIAC DEVICE HOME CHECK - REMOTE UNSCHEDULED: HCPCS | Performed by: INTERNAL MEDICINE

## 2021-04-01 PROCEDURE — 93294 REM INTERROG EVL PM/LDLS PM: CPT | Performed by: INTERNAL MEDICINE

## 2021-04-13 RX ORDER — LISINOPRIL 20 MG/1
20 TABLET ORAL DAILY
Qty: 90 TABLET | Refills: 1 | Status: SHIPPED | OUTPATIENT
Start: 2021-04-13 | End: 2021-11-01

## 2021-04-28 NOTE — PATIENT INSTRUCTIONS
Begin amlodipine 2.5 mg 1 pill daily. Continue other medications. Return visit in 4-6 weeks for a blood pressure check. Pt s/p permcath removal in IR. Pt recovered in holding. Left upper chest dressing with old drainage. Dressing was changed and pt given additional dressing to bring home to change dressing daily or when saturated until incision site closed.  Pt ate and drank

## 2021-05-03 RX ORDER — ATORVASTATIN CALCIUM 20 MG/1
20 TABLET, FILM COATED ORAL DAILY
Qty: 90 TABLET | Refills: 1 | Status: SHIPPED | OUTPATIENT
Start: 2021-05-03 | End: 2021-11-01

## 2021-05-03 RX ORDER — AMLODIPINE BESYLATE 5 MG/1
5 TABLET ORAL DAILY
Qty: 90 TABLET | Refills: 1 | Status: SHIPPED | OUTPATIENT
Start: 2021-05-03 | End: 2021-11-01

## 2021-07-08 ENCOUNTER — OFFICE VISIT (OUTPATIENT)
Dept: OPHTHALMOLOGY | Facility: CLINIC | Age: 81
End: 2021-07-08
Payer: MEDICARE

## 2021-07-08 DIAGNOSIS — H40.1131 PRIMARY OPEN ANGLE GLAUCOMA (POAG) OF BOTH EYES, MILD STAGE: Primary | ICD-10-CM

## 2021-07-08 PROCEDURE — 99213 OFFICE O/P EST LOW 20 MIN: CPT | Performed by: OPHTHALMOLOGY

## 2021-07-08 NOTE — PROGRESS NOTES
Jorge Mccullough is a [de-identified]year old male. HPI:     HPI     Here for an IOP check. He is taking Latanoprost OU qhs as directed. Pt states that his vision is stable but occasionally takes his glasses off when looking at the computer to see better.      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 Will continue same regimen of Latanoprost in both eyes at bedtime.      Will have patie

## 2021-07-18 ENCOUNTER — LAB ENCOUNTER (OUTPATIENT)
Dept: LAB | Facility: HOSPITAL | Age: 81
End: 2021-07-18
Attending: INTERNAL MEDICINE
Payer: MEDICARE

## 2021-07-18 DIAGNOSIS — E78.5 DYSLIPIDEMIA: ICD-10-CM

## 2021-07-18 DIAGNOSIS — I25.10 CORONARY ATHEROSCLEROSIS OF NATIVE CORONARY ARTERY: Primary | ICD-10-CM

## 2021-07-18 DIAGNOSIS — I48.0 PAROXYSMAL ATRIAL FIBRILLATION (HCC): ICD-10-CM

## 2021-07-18 DIAGNOSIS — Z95.0 PACEMAKER: ICD-10-CM

## 2021-07-18 DIAGNOSIS — I10 ESSENTIAL HYPERTENSION, MALIGNANT: ICD-10-CM

## 2021-07-18 DIAGNOSIS — I44.2 THIRD DEGREE ATRIOVENTRICULAR BLOCK (HCC): ICD-10-CM

## 2021-07-18 LAB
ALBUMIN SERPL-MCNC: 3.7 G/DL (ref 3.4–5)
ALBUMIN/GLOB SERPL: 1.1 {RATIO} (ref 1–2)
ALP LIVER SERPL-CCNC: 63 U/L
ALT SERPL-CCNC: 26 U/L
ANION GAP SERPL CALC-SCNC: 5 MMOL/L (ref 0–18)
AST SERPL-CCNC: 18 U/L (ref 15–37)
BASOPHILS # BLD AUTO: 0.03 X10(3) UL (ref 0–0.2)
BASOPHILS NFR BLD AUTO: 0.7 %
BILIRUB SERPL-MCNC: 0.7 MG/DL (ref 0.1–2)
BUN BLD-MCNC: 25 MG/DL (ref 7–18)
BUN/CREAT SERPL: 20.5 (ref 10–20)
CALCIUM BLD-MCNC: 9 MG/DL (ref 8.5–10.1)
CHLORIDE SERPL-SCNC: 109 MMOL/L (ref 98–112)
CHOLEST SMN-MCNC: 188 MG/DL (ref ?–200)
CO2 SERPL-SCNC: 27 MMOL/L (ref 21–32)
CREAT BLD-MCNC: 1.22 MG/DL
DEPRECATED RDW RBC AUTO: 47.6 FL (ref 35.1–46.3)
EOSINOPHIL # BLD AUTO: 0.19 X10(3) UL (ref 0–0.7)
EOSINOPHIL NFR BLD AUTO: 4.4 %
ERYTHROCYTE [DISTWIDTH] IN BLOOD BY AUTOMATED COUNT: 12.6 % (ref 11–15)
GLOBULIN PLAS-MCNC: 3.5 G/DL (ref 2.8–4.4)
GLUCOSE BLD-MCNC: 92 MG/DL (ref 70–99)
HCT VFR BLD AUTO: 40 %
HDLC SERPL-MCNC: 129 MG/DL (ref 40–59)
HGB BLD-MCNC: 13.1 G/DL
IMM GRANULOCYTES # BLD AUTO: 0.02 X10(3) UL (ref 0–1)
IMM GRANULOCYTES NFR BLD: 0.5 %
LDLC SERPL CALC-MCNC: 49 MG/DL (ref ?–100)
LYMPHOCYTES # BLD AUTO: 1.11 X10(3) UL (ref 1–4)
LYMPHOCYTES NFR BLD AUTO: 26 %
M PROTEIN MFR SERPL ELPH: 7.2 G/DL (ref 6.4–8.2)
MCH RBC QN AUTO: 33.3 PG (ref 26–34)
MCHC RBC AUTO-ENTMCNC: 32.8 G/DL (ref 31–37)
MCV RBC AUTO: 101.8 FL
MONOCYTES # BLD AUTO: 0.51 X10(3) UL (ref 0.1–1)
MONOCYTES NFR BLD AUTO: 11.9 %
NEUTROPHILS # BLD AUTO: 2.41 X10 (3) UL (ref 1.5–7.7)
NEUTROPHILS # BLD AUTO: 2.41 X10(3) UL (ref 1.5–7.7)
NEUTROPHILS NFR BLD AUTO: 56.5 %
NONHDLC SERPL-MCNC: 59 MG/DL (ref ?–130)
OSMOLALITY SERPL CALC.SUM OF ELEC: 296 MOSM/KG (ref 275–295)
PATIENT FASTING Y/N/NP: YES
PATIENT FASTING Y/N/NP: YES
PLATELET # BLD AUTO: 165 10(3)UL (ref 150–450)
POTASSIUM SERPL-SCNC: 5 MMOL/L (ref 3.5–5.1)
RBC # BLD AUTO: 3.93 X10(6)UL
SODIUM SERPL-SCNC: 141 MMOL/L (ref 136–145)
TRIGL SERPL-MCNC: 53 MG/DL (ref 30–149)
VLDLC SERPL CALC-MCNC: 7 MG/DL (ref 0–30)
WBC # BLD AUTO: 4.3 X10(3) UL (ref 4–11)

## 2021-07-18 PROCEDURE — 36415 COLL VENOUS BLD VENIPUNCTURE: CPT

## 2021-07-18 PROCEDURE — 85025 COMPLETE CBC W/AUTO DIFF WBC: CPT

## 2021-07-18 PROCEDURE — 80061 LIPID PANEL: CPT

## 2021-07-18 PROCEDURE — 80053 COMPREHEN METABOLIC PANEL: CPT

## 2021-07-20 ENCOUNTER — APPOINTMENT (OUTPATIENT)
Dept: CARDIOLOGY | Age: 81
End: 2021-07-20
Attending: INTERNAL MEDICINE

## 2021-08-05 NOTE — H&P
[facility]    Cardiac Electrophysiology H&P [address] [facility] Noman Herman   [facility] [id number] MRN [id number]   Admission Date 8/16/2021 Procedure Date 8/16/2021   Attending Physician Jasmina Flores MD Procedure Physician

## 2021-08-16 ENCOUNTER — ANESTHESIA (OUTPATIENT)
Dept: INTERVENTIONAL RADIOLOGY/VASCULAR | Facility: HOSPITAL | Age: 81
End: 2021-08-16
Payer: MEDICARE

## 2021-08-16 ENCOUNTER — HOSPITAL ENCOUNTER (OUTPATIENT)
Dept: INTERVENTIONAL RADIOLOGY/VASCULAR | Facility: HOSPITAL | Age: 81
Discharge: HOME | End: 2021-08-16
Attending: INTERNAL MEDICINE | Admitting: INTERNAL MEDICINE
Payer: MEDICARE

## 2021-08-16 VITALS
HEIGHT: 69 IN | RESPIRATION RATE: 18 BRPM | SYSTOLIC BLOOD PRESSURE: 135 MMHG | WEIGHT: 220 LBS | OXYGEN SATURATION: 97 % | TEMPERATURE: 99 F | BODY MASS INDEX: 32.58 KG/M2 | DIASTOLIC BLOOD PRESSURE: 70 MMHG | HEART RATE: 66 BPM

## 2021-08-16 DIAGNOSIS — I48.11 LONGSTANDING PERSISTENT ATRIAL FIBRILLATION (HCC): ICD-10-CM

## 2021-08-16 DIAGNOSIS — Z01.818 PRE-OP TESTING: Primary | ICD-10-CM

## 2021-08-16 LAB
ALBUMIN SERPL-MCNC: 4.1 G/DL (ref 3.4–5)
ALBUMIN/GLOB SERPL: 1.2 {RATIO} (ref 1–2)
ALP LIVER SERPL-CCNC: 65 U/L
ALT SERPL-CCNC: 28 U/L
ANION GAP SERPL CALC-SCNC: 8 MMOL/L (ref 0–18)
AST SERPL-CCNC: 17 U/L (ref 15–37)
BASOPHILS # BLD AUTO: 0.03 X10(3) UL (ref 0–0.2)
BASOPHILS NFR BLD AUTO: 0.6 %
BILIRUB SERPL-MCNC: 1.2 MG/DL (ref 0.1–2)
BUN BLD-MCNC: 29 MG/DL (ref 7–18)
BUN/CREAT SERPL: 23.2 (ref 10–20)
CALCIUM BLD-MCNC: 9.3 MG/DL (ref 8.5–10.1)
CHLORIDE SERPL-SCNC: 108 MMOL/L (ref 98–112)
CO2 SERPL-SCNC: 24 MMOL/L (ref 21–32)
CREAT BLD-MCNC: 1.25 MG/DL
DEPRECATED RDW RBC AUTO: 47.3 FL (ref 35.1–46.3)
EOSINOPHIL # BLD AUTO: 0.06 X10(3) UL (ref 0–0.7)
EOSINOPHIL NFR BLD AUTO: 1.2 %
ERYTHROCYTE [DISTWIDTH] IN BLOOD BY AUTOMATED COUNT: 12.7 % (ref 11–15)
GLOBULIN PLAS-MCNC: 3.4 G/DL (ref 2.8–4.4)
GLUCOSE BLD-MCNC: 104 MG/DL (ref 70–99)
HCT VFR BLD AUTO: 41.9 %
HGB BLD-MCNC: 14.1 G/DL
IMM GRANULOCYTES # BLD AUTO: 0.01 X10(3) UL (ref 0–1)
IMM GRANULOCYTES NFR BLD: 0.2 %
LYMPHOCYTES # BLD AUTO: 0.78 X10(3) UL (ref 1–4)
LYMPHOCYTES NFR BLD AUTO: 15.5 %
M PROTEIN MFR SERPL ELPH: 7.5 G/DL (ref 6.4–8.2)
MCH RBC QN AUTO: 33.7 PG (ref 26–34)
MCHC RBC AUTO-ENTMCNC: 33.7 G/DL (ref 31–37)
MCV RBC AUTO: 100 FL
MONOCYTES # BLD AUTO: 0.51 X10(3) UL (ref 0.1–1)
MONOCYTES NFR BLD AUTO: 10.1 %
NEUTROPHILS # BLD AUTO: 3.65 X10 (3) UL (ref 1.5–7.7)
NEUTROPHILS # BLD AUTO: 3.65 X10(3) UL (ref 1.5–7.7)
NEUTROPHILS NFR BLD AUTO: 72.4 %
OSMOLALITY SERPL CALC.SUM OF ELEC: 296 MOSM/KG (ref 275–295)
PLATELET # BLD AUTO: 158 10(3)UL (ref 150–450)
POTASSIUM SERPL-SCNC: 4.5 MMOL/L (ref 3.5–5.1)
RBC # BLD AUTO: 4.19 X10(6)UL
SODIUM SERPL-SCNC: 140 MMOL/L (ref 136–145)
WBC # BLD AUTO: 5 X10(3) UL (ref 4–11)

## 2021-08-16 PROCEDURE — 92960 CARDIOVERSION ELECTRIC EXT: CPT

## 2021-08-16 PROCEDURE — 85025 COMPLETE CBC W/AUTO DIFF WBC: CPT | Performed by: INTERNAL MEDICINE

## 2021-08-16 PROCEDURE — 80053 COMPREHEN METABOLIC PANEL: CPT | Performed by: INTERNAL MEDICINE

## 2021-08-16 PROCEDURE — 5A2204Z RESTORATION OF CARDIAC RHYTHM, SINGLE: ICD-10-PCS | Performed by: INTERNAL MEDICINE

## 2021-08-16 RX ORDER — SODIUM CHLORIDE 9 MG/ML
INJECTION, SOLUTION INTRAVENOUS CONTINUOUS
Status: DISCONTINUED | OUTPATIENT
Start: 2021-08-16 | End: 2021-08-16

## 2021-08-16 RX ORDER — LIDOCAINE HYDROCHLORIDE 10 MG/ML
INJECTION, SOLUTION EPIDURAL; INFILTRATION; INTRACAUDAL; PERINEURAL AS NEEDED
Status: DISCONTINUED | OUTPATIENT
Start: 2021-08-16 | End: 2021-08-16 | Stop reason: SURG

## 2021-08-16 RX ADMIN — LIDOCAINE HYDROCHLORIDE 50 MG: 10 INJECTION, SOLUTION EPIDURAL; INFILTRATION; INTRACAUDAL; PERINEURAL at 15:42:00

## 2021-08-16 NOTE — IVS NOTE
Patient awake and alert    VSS, LOW    Instructions given to patient and family, they stated understanding. Will follow up with Dr Raquel Bean after further testing is finished.

## 2021-08-16 NOTE — ANESTHESIA PREPROCEDURE EVALUATION
Anesthesia PreOp Note    HPI:     Bro Trotter is a [de-identified]year old male who presents for preoperative consultation requested by: * No surgeons listed *    Date of Surgery: 8/16/2021    * No procedures listed *  Indication: * No pre-op diagnosis entered * severe LAE, mild TR with peak pulmonary artery pressure 38 mmHg   • Paroxysmal atrial fibrillation (HCC)    • Pulmonary hypertension (HCC)     Echo 7-18 with peak pulmonary artery pressure 38 mmHg   • Second degree AV block     Status post pacemaker placem name: Not on file      Number of children: Not on file      Years of education: Not on file      Highest education level: Not on file    Occupational History      Occupation: Retired dentist    Tobacco Use      Smoking status: Former Smoker        Packs/da Intimate Partner Violence:       Fear of Current or Ex-Partner:       Emotionally Abused:       Physically Abused:       Sexually Abused:     Available pre-op labs reviewed.   Lab Results   Component Value Date    WBC 5.0 08/16/2021    RBC 4.19 08/16/2021 preop done by other):  MAC poss GA      I have informed Santo Bahenate and/or legal guardian or family member of the nature of the anesthetic plan, benefits, risks including possible dental damage if relevant, major complications, and any alternative form

## 2021-08-16 NOTE — PROCEDURES
[facility]    Cardiac Electrophysiology Procedure Note    [facility]   [facility] [id number] MRN [id number]   Admission Date 8/16/2021 Procedure Date 8/16/2021   Attending Physician Faustina Rubin MD Procedure Physicia

## 2021-08-16 NOTE — ANESTHESIA POSTPROCEDURE EVALUATION
Patient: Garcia Garcia    Procedure Summary     Date: 08/16/21 Room / Location: [facility]    Anesthesia Start: 3031 Anesthesia Stop: 5716    Procedure: EP CARDIOVERSION 1X Diagnosis:       Longstanding persistent atrial fibr

## 2021-09-14 NOTE — H&P
[facility]    Cardiac Electrophysiology H&P Addendum    Eloise Cruz   [facility] [id number] MRN [id number]   Admission Date 9/22/2021 Procedure Date 9/22/2021   Attending Physician Ben Stahl MD Procedure Physician

## 2021-09-17 RX ORDER — SOTALOL HYDROCHLORIDE 120 MG/1
120 TABLET ORAL 2 TIMES DAILY
COMMUNITY

## 2021-09-19 ENCOUNTER — LAB ENCOUNTER (OUTPATIENT)
Dept: LAB | Facility: HOSPITAL | Age: 81
End: 2021-09-19
Attending: INTERNAL MEDICINE
Payer: MEDICARE

## 2021-09-19 DIAGNOSIS — Z01.818 PREOP TESTING: ICD-10-CM

## 2021-09-19 LAB
ALBUMIN SERPL-MCNC: 3.7 G/DL (ref 3.4–5)
ALBUMIN/GLOB SERPL: 1 {RATIO} (ref 1–2)
ALP LIVER SERPL-CCNC: 59 U/L
ALT SERPL-CCNC: 25 U/L
ANION GAP SERPL CALC-SCNC: 3 MMOL/L (ref 0–18)
AST SERPL-CCNC: 23 U/L (ref 15–37)
BILIRUB SERPL-MCNC: 0.7 MG/DL (ref 0.1–2)
BUN BLD-MCNC: 37 MG/DL (ref 7–18)
BUN/CREAT SERPL: 25.7 (ref 10–20)
CALCIUM BLD-MCNC: 9.3 MG/DL (ref 8.5–10.1)
CHLORIDE SERPL-SCNC: 112 MMOL/L (ref 98–112)
CO2 SERPL-SCNC: 26 MMOL/L (ref 21–32)
CREAT BLD-MCNC: 1.44 MG/DL
GLOBULIN PLAS-MCNC: 3.6 G/DL (ref 2.8–4.4)
GLUCOSE BLD-MCNC: 90 MG/DL (ref 70–99)
OSMOLALITY SERPL CALC.SUM OF ELEC: 300 MOSM/KG (ref 275–295)
PATIENT FASTING Y/N/NP: YES
POTASSIUM SERPL-SCNC: 5.5 MMOL/L (ref 3.5–5.1)
PROT SERPL-MCNC: 7.3 G/DL (ref 6.4–8.2)
SODIUM SERPL-SCNC: 141 MMOL/L (ref 136–145)

## 2021-09-19 PROCEDURE — 80053 COMPREHEN METABOLIC PANEL: CPT

## 2021-09-19 PROCEDURE — 36415 COLL VENOUS BLD VENIPUNCTURE: CPT

## 2021-09-21 LAB — SARS-COV-2 RNA RESP QL NAA+PROBE: NOT DETECTED

## 2021-09-22 ENCOUNTER — ANESTHESIA (OUTPATIENT)
Dept: INTERVENTIONAL RADIOLOGY/VASCULAR | Facility: HOSPITAL | Age: 81
End: 2021-09-22
Payer: MEDICARE

## 2021-09-22 ENCOUNTER — HOSPITAL ENCOUNTER (OUTPATIENT)
Dept: INTERVENTIONAL RADIOLOGY/VASCULAR | Facility: HOSPITAL | Age: 81
Discharge: HOME | End: 2021-09-22
Attending: INTERNAL MEDICINE | Admitting: INTERNAL MEDICINE
Payer: MEDICARE

## 2021-09-22 VITALS
SYSTOLIC BLOOD PRESSURE: 137 MMHG | OXYGEN SATURATION: 98 % | TEMPERATURE: 97 F | RESPIRATION RATE: 21 BRPM | BODY MASS INDEX: 32 KG/M2 | WEIGHT: 220 LBS | HEART RATE: 60 BPM | DIASTOLIC BLOOD PRESSURE: 69 MMHG

## 2021-09-22 DIAGNOSIS — I48.19 PERSISTENT ATRIAL FIBRILLATION (HCC): ICD-10-CM

## 2021-09-22 DIAGNOSIS — Z01.818 PREOP TESTING: Primary | ICD-10-CM

## 2021-09-22 LAB — POTASSIUM SERPL-SCNC: 4.9 MMOL/L (ref 3.5–5.1)

## 2021-09-22 PROCEDURE — 36415 COLL VENOUS BLD VENIPUNCTURE: CPT

## 2021-09-22 PROCEDURE — 93005 ELECTROCARDIOGRAM TRACING: CPT

## 2021-09-22 PROCEDURE — 84132 ASSAY OF SERUM POTASSIUM: CPT | Performed by: INTERNAL MEDICINE

## 2021-09-22 PROCEDURE — 93010 ELECTROCARDIOGRAM REPORT: CPT | Performed by: INTERNAL MEDICINE

## 2021-09-22 PROCEDURE — 5A2204Z RESTORATION OF CARDIAC RHYTHM, SINGLE: ICD-10-PCS | Performed by: INTERNAL MEDICINE

## 2021-09-22 PROCEDURE — 92960 CARDIOVERSION ELECTRIC EXT: CPT

## 2021-09-22 RX ORDER — SODIUM CHLORIDE 9 MG/ML
INJECTION, SOLUTION INTRAVENOUS CONTINUOUS
Status: DISCONTINUED | OUTPATIENT
Start: 2021-09-22 | End: 2021-09-22

## 2021-09-22 NOTE — IVS NOTE
Tolerated procedure well. Did not want any PO  fluids or food. 12 lead ECG done. Discharge instructions given. To contact Dr. Jernigan Lock office for follow-up in 2-3 months or sooner if needed. Discharged home per W/C,VSS Remains in AV paced rhythm,no A-Fib.

## 2021-09-22 NOTE — ANESTHESIA PREPROCEDURE EVALUATION
Anesthesia PreOp Note    HPI:     Bro Trotter is a [de-identified]year old male who presents for preoperative consultation requested by: * No surgeons listed *    Date of Surgery: 9/22/2021    * No procedures listed *  Indication: * No pre-op diagnosis entered * severe LAE, mild TR with peak pulmonary artery pressure 38 mmHg   • Paroxysmal atrial fibrillation (HCC)    • Pulmonary hypertension (HCC)     Echo 7-18 with peak pulmonary artery pressure 38 mmHg   • Second degree AV block     Status post pacemaker placem Mother         Age 50   • Colon Cancer Paternal Grandmother    • Other (Lung Cancer) Paternal Grandfather    • Macular degeneration Cousin    • Glaucoma Neg      Social History    Socioeconomic History      Marital status:        Spouse name: Not on file  Stress:       Feeling of Stress : Not on file  Social Connections:       Frequency of Communication with Friends and Family: Not on file      Frequency of Social Gatherings with Friends and Family: Not on file      Attends Sikh Services: Not on Cardiovascular - normal exam  Exercise tolerance: good  (+) pacemaker, hypertension, CAD, dysrhythmias (Afib),     ROS comment: Persistent Aifb/Aflutter with 3rd deg heart block and pacemaker. Having dysnpnea- was resolved after previous cardioversion.  Now

## 2021-09-22 NOTE — PROCEDURES
[facility]    Cardiac Electrophysiology Procedure Note    [facility]   [facility] [id number] MRN [id number]   Admission Date 9/22/2021 Procedure Date 9/22/2021   Attending Physician Shwetha Person MD Procedure Physicia

## 2021-09-22 NOTE — ANESTHESIA POSTPROCEDURE EVALUATION
Patient: Fadi Yap    Procedure Summary     Date: 09/22/21 Room / Location: [facility]    Anesthesia Start: 1118 Anesthesia Stop: 1130    Procedure: EP CARDIOVERSION 1X Diagnosis:       Persistent atrial fibrillation ([address]

## 2021-09-28 ENCOUNTER — LAB ENCOUNTER (OUTPATIENT)
Dept: LAB | Facility: REFERENCE LAB | Age: 81
End: 2021-09-28
Attending: INTERNAL MEDICINE
Payer: MEDICARE

## 2021-09-28 DIAGNOSIS — I25.10 CORONARY ATHEROSCLEROSIS OF NATIVE CORONARY ARTERY: ICD-10-CM

## 2021-09-28 DIAGNOSIS — I10 ESSENTIAL HYPERTENSION, MALIGNANT: Primary | ICD-10-CM

## 2021-09-28 PROCEDURE — 36415 COLL VENOUS BLD VENIPUNCTURE: CPT

## 2021-09-28 PROCEDURE — 80069 RENAL FUNCTION PANEL: CPT

## 2021-10-01 ENCOUNTER — IMMUNIZATION (OUTPATIENT)
Dept: LAB | Facility: HOSPITAL | Age: 81
End: 2021-10-01
Attending: EMERGENCY MEDICINE
Payer: MEDICARE

## 2021-10-01 DIAGNOSIS — Z23 NEED FOR VACCINATION: Primary | ICD-10-CM

## 2021-10-01 PROCEDURE — 0003A SARSCOV2 VAC 30MCG/0.3ML IM: CPT

## 2021-10-29 ENCOUNTER — OFFICE VISIT (OUTPATIENT)
Dept: INTERNAL MEDICINE CLINIC | Facility: CLINIC | Age: 81
End: 2021-10-29
Payer: MEDICARE

## 2021-10-29 VITALS
SYSTOLIC BLOOD PRESSURE: 134 MMHG | HEART RATE: 60 BPM | BODY MASS INDEX: 32.73 KG/M2 | HEIGHT: 69 IN | WEIGHT: 221 LBS | DIASTOLIC BLOOD PRESSURE: 68 MMHG

## 2021-10-29 DIAGNOSIS — I10 ESSENTIAL HYPERTENSION: Primary | ICD-10-CM

## 2021-10-29 DIAGNOSIS — I25.10 ASHD (ARTERIOSCLEROTIC HEART DISEASE): ICD-10-CM

## 2021-10-29 DIAGNOSIS — I48.0 PAROXYSMAL ATRIAL FIBRILLATION (HCC): ICD-10-CM

## 2021-10-29 PROCEDURE — 90662 IIV NO PRSV INCREASED AG IM: CPT | Performed by: INTERNAL MEDICINE

## 2021-10-29 PROCEDURE — G0008 ADMIN INFLUENZA VIRUS VAC: HCPCS | Performed by: INTERNAL MEDICINE

## 2021-10-29 PROCEDURE — 99214 OFFICE O/P EST MOD 30 MIN: CPT | Performed by: INTERNAL MEDICINE

## 2021-10-29 NOTE — PROGRESS NOTES
Basia Reyes is a [de-identified]year old male. 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 Last visit with me was for a Medicare physical in February.     He is seeing Cardiology regula (benign prostatic hyperplasia)    • Chronic kidney disease, stage 3 (HCC)    • Essential hypertension    • Glaucoma 12/20/2017 12/20/17 Started Latanoprost OS qhs due to OCT thinning OS 12/13/18 Start Latanprost qhs OD due to thinning of RNFL OD    • Hy sotalol after 2 recent cardioversions. The patient indicates understanding of these issues and agrees to the plan. The patient is asked to return in February.     Trina Newman MD  10/29/2021  11:10 AM

## 2021-10-29 NOTE — PATIENT INSTRUCTIONS
Your blood pressure today was good at 134/68. You received a high-dose flu shot today. Continue current medication. Please try to eat healthy and exercise regularly. Medicare physical in February.

## 2021-11-01 RX ORDER — AMLODIPINE BESYLATE 5 MG/1
5 TABLET ORAL DAILY
Qty: 90 TABLET | Refills: 1 | Status: SHIPPED | OUTPATIENT
Start: 2021-11-01 | End: 2022-01-30

## 2021-11-01 RX ORDER — LISINOPRIL 20 MG/1
20 TABLET ORAL DAILY
Qty: 90 TABLET | Refills: 1 | Status: SHIPPED | OUTPATIENT
Start: 2021-11-01

## 2021-11-01 RX ORDER — ATORVASTATIN CALCIUM 20 MG/1
20 TABLET, FILM COATED ORAL DAILY
Qty: 90 TABLET | Refills: 1 | Status: SHIPPED | OUTPATIENT
Start: 2021-11-01

## 2021-11-01 NOTE — TELEPHONE ENCOUNTER
Refill passed per [address] [facility] [place] protocol. Requested Prescriptions   Pending Prescriptions Disp Refills    LISINOPRIL 20 MG Oral Tab [Pharmacy Med Name: *LISINOPRIL 20MG TAB] 90 tablet 0     Sig: Take 1 tablet (20 mg total) by mouth daily.         Hyper of both eyes, mild stage    [facility] Pradeep Mccarthy MD    Office Visit    8 months ago Annual physical exam    Matthew Sharif MD    Office Visit          Future Appointments

## 2021-11-10 ENCOUNTER — OFFICE VISIT (OUTPATIENT)
Dept: OPHTHALMOLOGY | Facility: CLINIC | Age: 81
End: 2021-11-10
Payer: MEDICARE

## 2021-11-10 DIAGNOSIS — H40.1131 PRIMARY OPEN ANGLE GLAUCOMA (POAG) OF BOTH EYES, MILD STAGE: Primary | ICD-10-CM

## 2021-11-10 PROCEDURE — 99213 OFFICE O/P EST LOW 20 MIN: CPT | Performed by: OPHTHALMOLOGY

## 2021-11-10 NOTE — PATIENT INSTRUCTIONS
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 Will continue same regimen of Latanoprost in both eyes at bedtime.      Will have patient back in 4 months for a visual field, OCT, refraction

## 2021-11-10 NOTE — PROGRESS NOTES
Gerda Walsh is a [de-identified]year old male. HPI:     HPI     Patient is here for an IOP check. He is taking Latanoprost OU at bedtime as directed. He states his vision is stable, he has no ocular complaints.       Last edited by Milana Quintanilla on 11/10/ tobacco: Never Used    Vaping Use      Vaping Use: Never used    Alcohol use:  Yes      Alcohol/week: 0.0 standard drinks      Comment: 2 glasses of wine most days    Drug use: No      Medications:  Current Outpatient Medications   Medication Sig Dispense R Temporal crescent    C/D Ratio 0.7 0.6            Refraction     Wearing Rx       Sphere Cylinder Axis Add    Right -4.25 +0.75 180 +2.75    Left -2.50 Sphere  +2.75    Type: Trifocals                 ASSESSMENT/PLAN:     Diagnoses and Plan:     Primary op

## 2021-11-10 NOTE — ASSESSMENT & PLAN NOTE
Intraocular pressure stable, tolerating medications. Will continue same regimen of Latanoprost in both eyes at bedtime. Will have patient back in 4 months for a visual field, OCT, refraction, dilated eye exam and photos.

## 2021-11-29 RX ORDER — LATANOPROST 50 UG/ML
SOLUTION/ DROPS OPHTHALMIC
Qty: 7.5 ML | Refills: 3 | Status: SHIPPED | OUTPATIENT
Start: 2021-11-29

## 2021-11-29 NOTE — TELEPHONE ENCOUNTER
LDE: 3/9/21  Last visit: 11/10/21   Due for: VF, OCT, EE and Photos   Upcoming visit: 3/22/22    Routed to [facility]

## 2022-03-22 ENCOUNTER — OFFICE VISIT (OUTPATIENT)
Dept: OPHTHALMOLOGY | Facility: CLINIC | Age: 82
End: 2022-03-22
Payer: MEDICARE

## 2022-03-22 DIAGNOSIS — H43.393 VITREOUS FLOATERS OF BOTH EYES: ICD-10-CM

## 2022-03-22 DIAGNOSIS — H25.13 AGE-RELATED NUCLEAR CATARACT OF BOTH EYES: ICD-10-CM

## 2022-03-22 DIAGNOSIS — H40.1131 PRIMARY OPEN ANGLE GLAUCOMA (POAG) OF BOTH EYES, MILD STAGE: Primary | ICD-10-CM

## 2022-03-22 PROCEDURE — 92015 DETERMINE REFRACTIVE STATE: CPT | Performed by: OPHTHALMOLOGY

## 2022-03-22 PROCEDURE — 92133 CPTRZD OPH DX IMG PST SGM ON: CPT | Performed by: OPHTHALMOLOGY

## 2022-03-22 PROCEDURE — 92083 EXTENDED VISUAL FIELD XM: CPT | Performed by: OPHTHALMOLOGY

## 2022-03-22 PROCEDURE — 92250 FUNDUS PHOTOGRAPHY W/I&R: CPT | Performed by: OPHTHALMOLOGY

## 2022-03-22 PROCEDURE — 92014 COMPRE OPH EXAM EST PT 1/>: CPT | Performed by: OPHTHALMOLOGY

## 2022-03-22 NOTE — ASSESSMENT & PLAN NOTE
Visual field and OCT completed in office today with stable results that were discussed with patient in office today. Photos were taken today to document optic nerves. Intraocular pressure stable, tolerating medications. Will continue same regimen of Latanoprost in both eyes at bedtime. Will have patient back in 4 months for a pressure check.

## 2022-03-22 NOTE — ASSESSMENT & PLAN NOTE
Discussed moderate cataracts in both eyes that are affecting vision but are not surgical at this time. New glasses today; update as needed. Discussed that new prescription is only a slight change.

## 2022-03-22 NOTE — PATIENT INSTRUCTIONS
Age-related nuclear cataract of both eyes  Discussed moderate cataracts in both eyes that are affecting vision but are not surgical at this time. New glasses today; update as needed. Discussed that new prescription is only a slight change. Vitreous floaters of both eyes  No treatment. 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 Photos were taken today to document optic nerves. Intraocular pressure stable, tolerating medications. Will continue same regimen of Latanoprost in both eyes at bedtime. Will have patient back in 4 months for a pressure check.

## 2022-03-29 ENCOUNTER — LAB ENCOUNTER (OUTPATIENT)
Dept: LAB | Facility: HOSPITAL | Age: 82
End: 2022-03-29
Attending: INTERNAL MEDICINE
Payer: MEDICARE

## 2022-03-29 ENCOUNTER — TELEPHONE (OUTPATIENT)
Dept: CARDIOLOGY | Age: 82
End: 2022-03-29

## 2022-03-29 DIAGNOSIS — Z79.01 ANTICOAGULATION MONITORING, SPECIAL RANGE: Primary | ICD-10-CM

## 2022-03-29 DIAGNOSIS — I48.19 PERSISTENT ATRIAL FIBRILLATION (HCC): ICD-10-CM

## 2022-03-29 DIAGNOSIS — E78.5 DYSLIPIDEMIA: ICD-10-CM

## 2022-03-29 DIAGNOSIS — I25.10 CORONARY ATHEROSCLEROSIS OF NATIVE CORONARY ARTERY: ICD-10-CM

## 2022-03-29 DIAGNOSIS — I48.92 ATRIAL FLUTTER (HCC): ICD-10-CM

## 2022-03-29 LAB
ALBUMIN SERPL-MCNC: 3.6 G/DL (ref 3.4–5)
ALBUMIN/GLOB SERPL: 1.2 {RATIO} (ref 1–2)
ALP LIVER SERPL-CCNC: 66 U/L
ALT SERPL-CCNC: 28 U/L
ANION GAP SERPL CALC-SCNC: 5 MMOL/L (ref 0–18)
AST SERPL-CCNC: 22 U/L (ref 15–37)
BASOPHILS # BLD AUTO: 0.02 X10(3) UL (ref 0–0.2)
BASOPHILS NFR BLD AUTO: 0.5 %
BILIRUB SERPL-MCNC: 1 MG/DL (ref 0.1–2)
BUN BLD-MCNC: 37 MG/DL (ref 7–18)
BUN/CREAT SERPL: 28.5 (ref 10–20)
CALCIUM BLD-MCNC: 9.5 MG/DL (ref 8.5–10.1)
CHLORIDE SERPL-SCNC: 107 MMOL/L (ref 98–112)
CHOLEST SERPL-MCNC: 183 MG/DL (ref ?–200)
CO2 SERPL-SCNC: 30 MMOL/L (ref 21–32)
CREAT BLD-MCNC: 1.3 MG/DL
DEPRECATED RDW RBC AUTO: 48.8 FL (ref 35.1–46.3)
EOSINOPHIL # BLD AUTO: 0.14 X10(3) UL (ref 0–0.7)
EOSINOPHIL NFR BLD AUTO: 3.5 %
ERYTHROCYTE [DISTWIDTH] IN BLOOD BY AUTOMATED COUNT: 12.5 % (ref 11–15)
FASTING PATIENT LIPID ANSWER: YES
FASTING STATUS PATIENT QL REPORTED: YES
GLOBULIN PLAS-MCNC: 2.9 G/DL (ref 2.8–4.4)
GLUCOSE BLD-MCNC: 87 MG/DL (ref 70–99)
HCT VFR BLD AUTO: 42.3 %
HDLC SERPL-MCNC: 108 MG/DL (ref 40–59)
HGB BLD-MCNC: 13.6 G/DL
IMM GRANULOCYTES # BLD AUTO: 0.01 X10(3) UL (ref 0–1)
IMM GRANULOCYTES NFR BLD: 0.3 %
LDLC SERPL CALC-MCNC: 63 MG/DL (ref ?–100)
LYMPHOCYTES # BLD AUTO: 0.65 X10(3) UL (ref 1–4)
LYMPHOCYTES NFR BLD AUTO: 16.4 %
MCH RBC QN AUTO: 33.7 PG (ref 26–34)
MCHC RBC AUTO-ENTMCNC: 32.2 G/DL (ref 31–37)
MCV RBC AUTO: 105 FL
MONOCYTES # BLD AUTO: 0.52 X10(3) UL (ref 0.1–1)
MONOCYTES NFR BLD AUTO: 13.1 %
NEUTROPHILS # BLD AUTO: 2.63 X10 (3) UL (ref 1.5–7.7)
NEUTROPHILS # BLD AUTO: 2.63 X10(3) UL (ref 1.5–7.7)
NEUTROPHILS NFR BLD AUTO: 66.2 %
NONHDLC SERPL-MCNC: 75 MG/DL (ref ?–130)
OSMOLALITY SERPL CALC.SUM OF ELEC: 302 MOSM/KG (ref 275–295)
PLATELET # BLD AUTO: 176 10(3)UL (ref 150–450)
POTASSIUM SERPL-SCNC: 5.6 MMOL/L (ref 3.5–5.1)
PROT SERPL-MCNC: 6.5 G/DL (ref 6.4–8.2)
RBC # BLD AUTO: 4.03 X10(6)UL
SODIUM SERPL-SCNC: 142 MMOL/L (ref 136–145)
TRIGL SERPL-MCNC: 64 MG/DL (ref 30–149)
VLDLC SERPL CALC-MCNC: 9 MG/DL (ref 0–30)
WBC # BLD AUTO: 4 X10(3) UL (ref 4–11)

## 2022-03-29 PROCEDURE — 85025 COMPLETE CBC W/AUTO DIFF WBC: CPT

## 2022-03-29 PROCEDURE — 80053 COMPREHEN METABOLIC PANEL: CPT

## 2022-03-29 PROCEDURE — 36415 COLL VENOUS BLD VENIPUNCTURE: CPT

## 2022-03-29 PROCEDURE — 80061 LIPID PANEL: CPT

## 2022-04-15 ENCOUNTER — LAB ENCOUNTER (OUTPATIENT)
Dept: LAB | Facility: HOSPITAL | Age: 82
End: 2022-04-15
Attending: INTERNAL MEDICINE
Payer: MEDICARE

## 2022-04-15 DIAGNOSIS — I48.19 PERSISTENT ATRIAL FIBRILLATION (HCC): ICD-10-CM

## 2022-04-15 DIAGNOSIS — I25.10 CORONARY ATHEROSCLEROSIS OF NATIVE CORONARY ARTERY: ICD-10-CM

## 2022-04-15 DIAGNOSIS — I10 HTN (HYPERTENSION): Primary | ICD-10-CM

## 2022-04-15 LAB — POTASSIUM SERPL-SCNC: 5.3 MMOL/L (ref 3.5–5.1)

## 2022-04-15 PROCEDURE — 84132 ASSAY OF SERUM POTASSIUM: CPT

## 2022-04-15 PROCEDURE — 36415 COLL VENOUS BLD VENIPUNCTURE: CPT

## 2022-04-20 RX ORDER — ATORVASTATIN CALCIUM 20 MG/1
20 TABLET, FILM COATED ORAL DAILY
Qty: 90 TABLET | Refills: 0 | Status: SHIPPED | OUTPATIENT
Start: 2022-04-20 | End: 2022-07-13

## 2022-04-20 RX ORDER — LISINOPRIL 20 MG/1
20 TABLET ORAL DAILY
Qty: 90 TABLET | Refills: 0 | Status: SHIPPED | OUTPATIENT
Start: 2022-04-20 | End: 2022-07-13

## 2022-04-20 RX ORDER — AMLODIPINE BESYLATE 5 MG/1
5 TABLET ORAL DAILY
Qty: 90 TABLET | Refills: 0 | Status: SHIPPED | OUTPATIENT
Start: 2022-04-20 | End: 2022-07-13

## 2022-04-20 NOTE — TELEPHONE ENCOUNTER
Refill passed per [facility] protocol. Requested Prescriptions   Pending Prescriptions Disp Refills    AMLODIPINE 5 MG Oral Tab [Pharmacy Med Name: AMLODIPINE 5MG TAB] 90 tablet 0     Sig: Take 1 tablet (5 mg total) by mouth daily. Hypertensive Medications Protocol Passed - 4/20/2022 11:55 AM        Passed - CMP or BMP in past 12 months        Passed - Appointment in past 6 or next 3 months        Passed - GFR Non- > 50     Lab Results   Component Value Date    GFRNAA 51 (L) 03/29/2022                    ATORVASTATIN 20 MG Oral Tab [Pharmacy Med Name: ATORVASTATIN 20MG TAB] 90 tablet 0     Sig: Take 1 tablet (20 mg total) by mouth daily. Cholesterol Medication Protocol Passed - 4/20/2022 11:55 AM        Passed - ALT in past 12 months        Passed - LDL in past 12 months        Passed - Last ALT < 80       Lab Results   Component Value Date    ALT 28 03/29/2022             Passed - Last LDL < 130     Lab Results   Component Value Date    LDL 63 03/29/2022               Passed - Appointment in past 12 or next 3 months           LISINOPRIL 20 MG Oral Tab [Pharmacy Med Name: LISINOPRIL 20MG TAB] 90 tablet 0     Sig: Take 1 tablet (20 mg total) by mouth daily.         Hypertensive Medications Protocol Passed - 4/20/2022 11:55 AM        Passed - CMP or BMP in past 12 months        Passed - Appointment in past 6 or next 3 months        Passed - GFR Non- > 50     Lab Results   Component Value Date    GFRNAA 51 (L) 03/29/2022                        Recent Outpatient Visits              4 weeks ago Primary open angle glaucoma (POAG) of both eyes, mild stage    [facility] Nicole Peters MD    Office Visit    5 months ago Primary open angle glaucoma (POAG) of both eyes, mild stage    [facility] Nicole Peters MD    Office Visit    5 months ago Essential hypertension    [address] [place] [address] Laura Marti MD    Office Visit    7 months ago Preop testing    [facility]    Nurse Only    9 months ago Primary open angle glaucoma (POAG) of both eyes, mild stage    [facility] Lety Pimentel MD    Office Visit             Future Appointments         Provider Department Appt Notes    In 3 months Lety Pimentel MD [facility] EP/ 4 mos IOP

## 2022-07-10 ENCOUNTER — LAB ENCOUNTER (OUTPATIENT)
Dept: LAB | Facility: HOSPITAL | Age: 82
End: 2022-07-10
Attending: INTERNAL MEDICINE
Payer: MEDICARE

## 2022-07-10 DIAGNOSIS — I48.19 PERSISTENT ATRIAL FIBRILLATION (HCC): ICD-10-CM

## 2022-07-10 DIAGNOSIS — I10 ESSENTIAL HYPERTENSION: Primary | ICD-10-CM

## 2022-07-10 DIAGNOSIS — I25.10 NONOCCLUSIVE CORONARY ATHEROSCLEROSIS OF NATIVE CORONARY ARTERY: ICD-10-CM

## 2022-07-10 LAB — POTASSIUM SERPL-SCNC: 5.3 MMOL/L (ref 3.5–5.1)

## 2022-07-10 PROCEDURE — 84132 ASSAY OF SERUM POTASSIUM: CPT

## 2022-07-10 PROCEDURE — 36415 COLL VENOUS BLD VENIPUNCTURE: CPT

## 2022-07-13 RX ORDER — ATORVASTATIN CALCIUM 20 MG/1
20 TABLET, FILM COATED ORAL DAILY
Qty: 90 TABLET | Refills: 0 | Status: SHIPPED | OUTPATIENT
Start: 2022-07-13

## 2022-07-13 RX ORDER — LISINOPRIL 20 MG/1
20 TABLET ORAL DAILY
Qty: 90 TABLET | Refills: 0 | Status: SHIPPED | OUTPATIENT
Start: 2022-07-13

## 2022-07-13 RX ORDER — AMLODIPINE BESYLATE 5 MG/1
5 TABLET ORAL DAILY
Qty: 90 TABLET | Refills: 0 | Status: SHIPPED | OUTPATIENT
Start: 2022-07-13 | End: 2022-10-11

## 2022-07-13 NOTE — TELEPHONE ENCOUNTER
Dr. Leanna Hopkins     Please see message below   [address] is no longer in business  Med pended for your review / approval

## 2022-07-20 ENCOUNTER — OFFICE VISIT (OUTPATIENT)
Dept: INTERNAL MEDICINE CLINIC | Facility: CLINIC | Age: 82
End: 2022-07-20
Payer: MEDICARE

## 2022-07-20 VITALS
SYSTOLIC BLOOD PRESSURE: 124 MMHG | BODY MASS INDEX: 31.55 KG/M2 | WEIGHT: 213 LBS | HEART RATE: 69 BPM | HEIGHT: 69 IN | DIASTOLIC BLOOD PRESSURE: 62 MMHG

## 2022-07-20 DIAGNOSIS — Z00.00 ANNUAL PHYSICAL EXAM: Primary | ICD-10-CM

## 2022-07-20 DIAGNOSIS — I27.20 PULMONARY HYPERTENSION (HCC): ICD-10-CM

## 2022-07-20 DIAGNOSIS — Z00.00 ENCOUNTER FOR ANNUAL HEALTH EXAMINATION: ICD-10-CM

## 2022-07-20 DIAGNOSIS — I48.0 PAROXYSMAL ATRIAL FIBRILLATION (HCC): ICD-10-CM

## 2022-07-20 DIAGNOSIS — E78.00 HYPERCHOLESTEROLEMIA: ICD-10-CM

## 2022-07-20 DIAGNOSIS — I25.10 ASHD (ARTERIOSCLEROTIC HEART DISEASE): ICD-10-CM

## 2022-07-20 DIAGNOSIS — I44.1 SECOND DEGREE AV BLOCK: ICD-10-CM

## 2022-07-20 DIAGNOSIS — I10 ESSENTIAL HYPERTENSION: ICD-10-CM

## 2022-07-20 DIAGNOSIS — I70.0 AORTIC ATHEROSCLEROSIS (HCC): ICD-10-CM

## 2022-07-20 DIAGNOSIS — N18.30 STAGE 3 CHRONIC KIDNEY DISEASE, UNSPECIFIED WHETHER STAGE 3A OR 3B CKD (HCC): ICD-10-CM

## 2022-07-20 PROCEDURE — 1126F AMNT PAIN NOTED NONE PRSNT: CPT | Performed by: INTERNAL MEDICINE

## 2022-07-20 PROCEDURE — G0439 PPPS, SUBSEQ VISIT: HCPCS | Performed by: INTERNAL MEDICINE

## 2022-07-20 PROCEDURE — 99213 OFFICE O/P EST LOW 20 MIN: CPT | Performed by: INTERNAL MEDICINE

## 2022-07-20 PROCEDURE — 90677 PCV20 VACCINE IM: CPT | Performed by: INTERNAL MEDICINE

## 2022-07-20 PROCEDURE — G0009 ADMIN PNEUMOCOCCAL VACCINE: HCPCS | Performed by: INTERNAL MEDICINE

## 2022-08-09 ENCOUNTER — OFFICE VISIT (OUTPATIENT)
Dept: OPHTHALMOLOGY | Facility: CLINIC | Age: 82
End: 2022-08-09
Payer: MEDICARE

## 2022-08-09 DIAGNOSIS — H40.1131 PRIMARY OPEN ANGLE GLAUCOMA (POAG) OF BOTH EYES, MILD STAGE: Primary | ICD-10-CM

## 2022-08-09 PROCEDURE — 1126F AMNT PAIN NOTED NONE PRSNT: CPT | Performed by: OPHTHALMOLOGY

## 2022-08-09 PROCEDURE — 99213 OFFICE O/P EST LOW 20 MIN: CPT | Performed by: OPHTHALMOLOGY

## 2022-09-19 RX ORDER — LATANOPROST 50 UG/ML
SOLUTION/ DROPS OPHTHALMIC
Qty: 7.5 ML | Refills: 3 | Status: SHIPPED | OUTPATIENT
Start: 2022-09-19

## 2022-09-19 NOTE — TELEPHONE ENCOUNTER
Pt was seen on 8/9/22 and is scheduled next on 12/29 for an IOP check.  Rx pended to [facility] on Latanoprost

## 2022-10-13 RX ORDER — AMLODIPINE BESYLATE 5 MG/1
5 TABLET ORAL DAILY
Qty: 90 TABLET | Refills: 1 | Status: SHIPPED | OUTPATIENT
Start: 2022-10-13

## 2022-10-13 RX ORDER — ATORVASTATIN CALCIUM 20 MG/1
20 TABLET, FILM COATED ORAL DAILY
Qty: 90 TABLET | Refills: 1 | Status: SHIPPED | OUTPATIENT
Start: 2022-10-13

## 2022-10-13 NOTE — TELEPHONE ENCOUNTER
Routed to Dr Erin Mercedes for advise amlodipine refill, thanks. Refill passed per [facility] protocol   Requested Prescriptions   Pending Prescriptions Disp Refills    AMLODIPINE 5 MG Oral Tab [Pharmacy Med Name: Amlodipine Besylate 5 Mg Tab Unic] 90 tablet 0     Sig: TAKE ONE TABLET BY MOUTH ONE TIME DAILY        Hypertensive Medications Protocol Failed - 10/13/2022  9:52 AM        Failed - CMP or BMP in past 6 months     No results found for this or any previous visit (from the past 4392 hour(s)).               Passed - In person appointment in the past 12 or next 3 months       Recent Outpatient Visits              2 months ago Primary open angle glaucoma (POAG) of both eyes, mild stage    [facility] Morris Mcnair MD    Office Visit    2 months ago Annual physical exam    [address], [address], Kobe Ac MD    Office Visit    6 months ago Primary open angle glaucoma (POAG) of both eyes, mild stage    [facility] Morris Mcnari MD    Office Visit    11 months ago Primary open angle glaucoma (POAG) of both eyes, mild stage    [facility] Morris Mcnair MD    Office Visit    11 months ago Essential hypertension    [address], [address], Kobe Ac MD    Office Visit     Future Appointments         Provider Department Appt Notes    In 2 months Morris Mcnair MD [facility] EP/ 4 month IOP check               Passed - Last BP reading less than 140/90     BP Readings from Last 1 Encounters:  07/20/22 : 124/62                Passed - In person appointment or virtual visit in the past 6 months       Recent Outpatient Visits              2 months ago Primary open angle glaucoma (POAG) of both eyes, mild stage    [facility] Morris Mcnair MD    Office Visit    2 months ago Annual physical exam    [facility], [facility], Howard Sanchez MD    Office Visit    6 months ago Primary open angle glaucoma (POAG) of both eyes, mild stage    [facility] Justin Pope MD    Office Visit    11 months ago Primary open angle glaucoma (POAG) of both eyes, mild stage    [facility] Justin Pope MD    Office Visit    11 months ago Essential hypertension    [facility], [facility], Howard Sanchez MD    Office Visit     Future Appointments         Provider Department Appt Notes    In 2 months Justin Pope MD [facility] EP/ 4 month IOP check               Passed [facility] or GFRNAA > 50     GFR Evaluation  GFRNAA: 51 , resulted on 3/29/2022               ATORVASTATIN 20 MG Oral Tab [Pharmacy Med Name: Atorvastatin Calcium 20 Mg Tab Nort] 90 tablet 0     Sig: TAKE 1 TABLET (20MG TOTAL) BY MOUTH DAILY        Cholesterol Medication Protocol Passed - 10/13/2022  9:52 AM        Passed - ALT in past 12 months        Passed - LDL in past 12 months        Passed - Last ALT < 80       Lab Results   Component Value Date    ALT 28 03/29/2022             Passed - Last LDL < 130     Lab Results   Component Value Date    LDL 63 03/29/2022               Passed - In person appointment or virtual visit in the past 12 mos or appointment in next 3 mos       Recent Outpatient Visits              2 months ago Primary open angle glaucoma (POAG) of both eyes, mild stage    [facility] Justin Pope MD    Office Visit    2 months ago Annual physical exam    [facility], [facility], Howard Sanchez MD    Office Visit    6 months ago Primary open angle glaucoma (POAG) of both eyes, mild stage    [facility] Justin Pope MD    Office Visit    11 months ago Primary open angle glaucoma (POAG) of both eyes, mild stage    [facility] Bella Jordan MD    Office Visit    11 months ago Essential hypertension    [facility], [address], Chantal Hamilton MD    Office Visit     Future Appointments         Provider Department Appt Notes    In 2 months Bella Jordan MD [facility] EP/ 4 month IOP check

## 2022-10-21 NOTE — H&P
[facility]    Cardiac Electrophysiology H&P Addendum    Darylene Camara Location:[facility]   [facility] [id number] MRN [id number]   Admission Date 11/4/2022 Procedure Date 11/4/2022   Attending Physician Stefani Riedel, MD Procedure Physician Chacha Orellana MD       H&P ADDENDUM    I personally reviewed the attached H&P on 11/4/2022, and no significant changes have occurred in the patient's condition since the H&P was performed. Risks, alternatives, and benefits of procedure were again reviewed at bedside with the patient. They have no additional questions and wish to proceed. Chacha Orellana M.D.    Cardiac Electrophysiology       -----------------------------------------

## 2022-11-01 ENCOUNTER — NURSE ONLY (OUTPATIENT)
Dept: LAB | Facility: HOSPITAL | Age: 82
End: 2022-11-01
Attending: INTERNAL MEDICINE
Payer: MEDICARE

## 2022-11-01 ENCOUNTER — HOSPITAL ENCOUNTER (OUTPATIENT)
Dept: GENERAL RADIOLOGY | Facility: HOSPITAL | Age: 82
Discharge: HOME | End: 2022-11-01
Attending: INTERNAL MEDICINE
Payer: MEDICARE

## 2022-11-01 DIAGNOSIS — Z01.818 PRE-OP TESTING: ICD-10-CM

## 2022-11-01 LAB
ALBUMIN SERPL-MCNC: 4 G/DL (ref 3.4–5)
ALBUMIN/GLOB SERPL: 1.3 {RATIO} (ref 1–2)
ALP LIVER SERPL-CCNC: 66 U/L
ALT SERPL-CCNC: 24 U/L
ANION GAP SERPL CALC-SCNC: 9 MMOL/L (ref 0–18)
AST SERPL-CCNC: 16 U/L (ref 15–37)
BASOPHILS # BLD AUTO: 0.03 X10(3) UL (ref 0–0.2)
BASOPHILS NFR BLD AUTO: 0.6 %
BILIRUB SERPL-MCNC: 0.9 MG/DL (ref 0.1–2)
BUN BLD-MCNC: 40 MG/DL (ref 7–18)
BUN/CREAT SERPL: 28.4 (ref 10–20)
CALCIUM BLD-MCNC: 9.8 MG/DL (ref 8.5–10.1)
CHLORIDE SERPL-SCNC: 108 MMOL/L (ref 98–112)
CO2 SERPL-SCNC: 24 MMOL/L (ref 21–32)
CREAT BLD-MCNC: 1.41 MG/DL
DEPRECATED RDW RBC AUTO: 48.9 FL (ref 35.1–46.3)
EOSINOPHIL # BLD AUTO: 0.18 X10(3) UL (ref 0–0.7)
EOSINOPHIL NFR BLD AUTO: 3.9 %
ERYTHROCYTE [DISTWIDTH] IN BLOOD BY AUTOMATED COUNT: 12.5 % (ref 11–15)
FASTING STATUS PATIENT QL REPORTED: YES
GFR SERPLBLD BASED ON 1.73 SQ M-ARVRAT: 50 ML/MIN/1.73M2 (ref 60–?)
GLOBULIN PLAS-MCNC: 3.2 G/DL (ref 2.8–4.4)
GLUCOSE BLD-MCNC: 95 MG/DL (ref 70–99)
HCT VFR BLD AUTO: 39.7 %
HGB BLD-MCNC: 12.9 G/DL
IMM GRANULOCYTES # BLD AUTO: 0.02 X10(3) UL (ref 0–1)
IMM GRANULOCYTES NFR BLD: 0.4 %
LYMPHOCYTES # BLD AUTO: 0.59 X10(3) UL (ref 1–4)
LYMPHOCYTES NFR BLD AUTO: 12.7 %
MCH RBC QN AUTO: 34.1 PG (ref 26–34)
MCHC RBC AUTO-ENTMCNC: 32.5 G/DL (ref 31–37)
MCV RBC AUTO: 105 FL
MONOCYTES # BLD AUTO: 0.66 X10(3) UL (ref 0.1–1)
MONOCYTES NFR BLD AUTO: 14.3 %
MRSA DNA SPEC QL NAA+PROBE: NEGATIVE
NEUTROPHILS # BLD AUTO: 3.15 X10 (3) UL (ref 1.5–7.7)
NEUTROPHILS # BLD AUTO: 3.15 X10(3) UL (ref 1.5–7.7)
NEUTROPHILS NFR BLD AUTO: 68.1 %
OSMOLALITY SERPL CALC.SUM OF ELEC: 302 MOSM/KG (ref 275–295)
PLATELET # BLD AUTO: 144 10(3)UL (ref 150–450)
POTASSIUM SERPL-SCNC: 6 MMOL/L (ref 3.5–5.1)
PROT SERPL-MCNC: 7.2 G/DL (ref 6.4–8.2)
RBC # BLD AUTO: 3.78 X10(6)UL
SARS-COV-2 RNA RESP QL NAA+PROBE: NOT DETECTED
SODIUM SERPL-SCNC: 141 MMOL/L (ref 136–145)
WBC # BLD AUTO: 4.6 X10(3) UL (ref 4–11)

## 2022-11-01 PROCEDURE — 80053 COMPREHEN METABOLIC PANEL: CPT

## 2022-11-01 PROCEDURE — 87641 MR-STAPH DNA AMP PROBE: CPT

## 2022-11-01 PROCEDURE — 93005 ELECTROCARDIOGRAM TRACING: CPT

## 2022-11-01 PROCEDURE — 93010 ELECTROCARDIOGRAM REPORT: CPT | Performed by: INTERNAL MEDICINE

## 2022-11-01 PROCEDURE — 71046 X-RAY EXAM CHEST 2 VIEWS: CPT | Performed by: INTERNAL MEDICINE

## 2022-11-01 PROCEDURE — 85025 COMPLETE CBC W/AUTO DIFF WBC: CPT

## 2022-11-01 PROCEDURE — 36415 COLL VENOUS BLD VENIPUNCTURE: CPT

## 2022-11-03 ENCOUNTER — NURSE ONLY (OUTPATIENT)
Dept: LAB | Facility: HOSPITAL | Age: 82
End: 2022-11-03
Attending: INTERNAL MEDICINE
Payer: MEDICARE

## 2022-11-03 DIAGNOSIS — E87.5 HYPERKALEMIA: ICD-10-CM

## 2022-11-03 LAB — POTASSIUM SERPL-SCNC: 5.4 MMOL/L (ref 3.5–5.1)

## 2022-11-03 PROCEDURE — 36415 COLL VENOUS BLD VENIPUNCTURE: CPT

## 2022-11-03 PROCEDURE — 84132 ASSAY OF SERUM POTASSIUM: CPT

## 2022-11-04 ENCOUNTER — HOSPITAL ENCOUNTER (OUTPATIENT)
Dept: INTERVENTIONAL RADIOLOGY/VASCULAR | Facility: HOSPITAL | Age: 82
Setting detail: HOSPITAL OUTPATIENT SURGERY
Discharge: HOME | End: 2022-11-04
Attending: INTERNAL MEDICINE | Admitting: INTERNAL MEDICINE
Payer: MEDICARE

## 2022-11-04 VITALS
HEIGHT: 69 IN | HEART RATE: 64 BPM | SYSTOLIC BLOOD PRESSURE: 108 MMHG | DIASTOLIC BLOOD PRESSURE: 59 MMHG | TEMPERATURE: 97 F | WEIGHT: 212 LBS | BODY MASS INDEX: 31.4 KG/M2 | RESPIRATION RATE: 18 BRPM | OXYGEN SATURATION: 96 %

## 2022-11-04 DIAGNOSIS — Z01.818 PRE-OP TESTING: Primary | ICD-10-CM

## 2022-11-04 DIAGNOSIS — I48.92 ATRIAL FLUTTER (HCC): ICD-10-CM

## 2022-11-04 DIAGNOSIS — Z45.010 PACEMAKER BATTERY DEPLETION: ICD-10-CM

## 2022-11-04 DIAGNOSIS — I48.19 PERSISTENT ATRIAL FIBRILLATION (HCC): ICD-10-CM

## 2022-11-04 DIAGNOSIS — Z95.0 PRESENCE OF CARDIAC PACEMAKER: ICD-10-CM

## 2022-11-04 DIAGNOSIS — I44.2 AV BLOCK, 3RD DEGREE (HCC): ICD-10-CM

## 2022-11-04 LAB
ISTAT ACTIVATED CLOTTING TIME: 144 SECONDS (ref 125–137)
ISTAT ACTIVATED CLOTTING TIME: 289 SECONDS (ref 125–137)
ISTAT ACTIVATED CLOTTING TIME: 329 SECONDS (ref 125–137)

## 2022-11-04 PROCEDURE — 93656 COMPRE EP EVAL ABLTJ ATR FIB: CPT

## 2022-11-04 PROCEDURE — 85347 COAGULATION TIME ACTIVATED: CPT

## 2022-11-04 PROCEDURE — 0JPT0PZ REMOVAL OF CARDIAC RHYTHM RELATED DEVICE FROM TRUNK SUBCUTANEOUS TISSUE AND FASCIA, OPEN APPROACH: ICD-10-PCS | Performed by: INTERNAL MEDICINE

## 2022-11-04 PROCEDURE — B24CZZ3 ULTRASONOGRAPHY OF PERICARDIUM, INTRAVASCULAR: ICD-10-PCS | Performed by: INTERNAL MEDICINE

## 2022-11-04 PROCEDURE — B246ZZ3 ULTRASONOGRAPHY OF RIGHT AND LEFT HEART, INTRAVASCULAR: ICD-10-PCS | Performed by: INTERNAL MEDICINE

## 2022-11-04 PROCEDURE — 36415 COLL VENOUS BLD VENIPUNCTURE: CPT

## 2022-11-04 PROCEDURE — 0JH606Z INSERTION OF PACEMAKER, DUAL CHAMBER INTO CHEST SUBCUTANEOUS TISSUE AND FASCIA, OPEN APPROACH: ICD-10-PCS | Performed by: INTERNAL MEDICINE

## 2022-11-04 PROCEDURE — 4A023FZ MEASUREMENT OF CARDIAC RHYTHM, PERCUTANEOUS APPROACH: ICD-10-PCS | Performed by: INTERNAL MEDICINE

## 2022-11-04 PROCEDURE — 4A0234Z MEASUREMENT OF CARDIAC ELECTRICAL ACTIVITY, PERCUTANEOUS APPROACH: ICD-10-PCS | Performed by: INTERNAL MEDICINE

## 2022-11-04 PROCEDURE — 93657 TX L/R ATRIAL FIB ADDL: CPT

## 2022-11-04 PROCEDURE — 93655 ICAR CATH ABLTJ DSCRT ARRHYT: CPT

## 2022-11-04 PROCEDURE — 02K83ZZ MAP CONDUCTION MECHANISM, PERCUTANEOUS APPROACH: ICD-10-PCS | Performed by: INTERNAL MEDICINE

## 2022-11-04 PROCEDURE — 33228 REMV&REPLC PM GEN DUAL LEAD: CPT

## 2022-11-04 PROCEDURE — 99152 MOD SED SAME PHYS/QHP 5/>YRS: CPT

## 2022-11-04 PROCEDURE — 99153 MOD SED SAME PHYS/QHP EA: CPT

## 2022-11-04 PROCEDURE — 02583ZZ DESTRUCTION OF CONDUCTION MECHANISM, PERCUTANEOUS APPROACH: ICD-10-PCS | Performed by: INTERNAL MEDICINE

## 2022-11-04 RX ORDER — HEPARIN SODIUM 1000 [USP'U]/ML
INJECTION, SOLUTION INTRAVENOUS; SUBCUTANEOUS
Status: COMPLETED
Start: 2022-11-04 | End: 2022-11-04

## 2022-11-04 RX ORDER — ACETAMINOPHEN 325 MG/1
650 TABLET ORAL EVERY 4 HOURS PRN
Status: DISCONTINUED | OUTPATIENT
Start: 2022-11-04 | End: 2022-11-07

## 2022-11-04 RX ORDER — LIDOCAINE HYDROCHLORIDE AND EPINEPHRINE 10; 10 MG/ML; UG/ML
INJECTION, SOLUTION INFILTRATION; PERINEURAL
Status: COMPLETED
Start: 2022-11-04 | End: 2022-11-04

## 2022-11-04 RX ORDER — PROTAMINE SULFATE 10 MG/ML
INJECTION, SOLUTION INTRAVENOUS
Status: COMPLETED
Start: 2022-11-04 | End: 2022-11-04

## 2022-11-04 RX ORDER — SODIUM CHLORIDE 9 MG/ML
INJECTION, SOLUTION INTRAVENOUS CONTINUOUS
Status: DISCONTINUED | OUTPATIENT
Start: 2022-11-04 | End: 2022-11-07

## 2022-11-04 RX ORDER — MIDAZOLAM HYDROCHLORIDE 1 MG/ML
INJECTION INTRAMUSCULAR; INTRAVENOUS
Status: COMPLETED
Start: 2022-11-04 | End: 2022-11-04

## 2022-11-04 RX ORDER — CLINDAMYCIN HYDROCHLORIDE 300 MG/1
CAPSULE ORAL
Qty: 8 CAPSULE | Refills: 0 | Status: SHIPPED | OUTPATIENT
Start: 2022-11-04

## 2022-11-04 RX ORDER — ISOPROTERENOL HYDROCHLORIDE 0.2 MG/ML
INJECTION, SOLUTION INTRAMUSCULAR; INTRAVENOUS
Status: COMPLETED
Start: 2022-11-04 | End: 2022-11-04

## 2022-11-04 RX ORDER — APIXABAN 5 MG/1
5 TABLET, FILM COATED ORAL 2 TIMES DAILY
Refills: 5 | Status: SHIPPED | COMMUNITY
Start: 2022-11-04

## 2022-11-04 RX ORDER — SOTALOL HYDROCHLORIDE 160 MG/1
160 TABLET ORAL DAILY
Qty: 90 TABLET | Refills: 0 | Status: SHIPPED | OUTPATIENT
Start: 2022-11-04 | End: 2023-02-02

## 2022-11-04 RX ORDER — ACETAMINOPHEN AND CODEINE PHOSPHATE 300; 30 MG/1; MG/1
2 TABLET ORAL EVERY 4 HOURS PRN
Status: DISCONTINUED | OUTPATIENT
Start: 2022-11-04 | End: 2022-11-07

## 2022-11-04 RX ORDER — CLINDAMYCIN PHOSPHATE 900 MG/50ML
INJECTION INTRAVENOUS
Status: COMPLETED
Start: 2022-11-04 | End: 2022-11-04

## 2022-11-04 RX ORDER — ACETAMINOPHEN AND CODEINE PHOSPHATE 300; 30 MG/1; MG/1
1 TABLET ORAL EVERY 4 HOURS PRN
Status: DISCONTINUED | OUTPATIENT
Start: 2022-11-04 | End: 2022-11-07

## 2022-11-04 RX ORDER — CLINDAMYCIN HYDROCHLORIDE 300 MG/1
CAPSULE ORAL
Qty: 8 CAPSULE | Refills: 0 | Status: SHIPPED | OUTPATIENT
Start: 2022-11-04 | End: 2022-11-04

## 2022-11-04 RX ORDER — LIDOCAINE HYDROCHLORIDE 20 MG/ML
INJECTION, SOLUTION EPIDURAL; INFILTRATION; INTRACAUDAL; PERINEURAL
Status: COMPLETED
Start: 2022-11-04 | End: 2022-11-04

## 2022-11-04 RX ADMIN — SODIUM CHLORIDE: 9 INJECTION, SOLUTION INTRAVENOUS at 08:30:00

## 2022-11-04 NOTE — IVS NOTE
DISCHARGE NOTE     Pt is able to sit up and ambulate without difficulty. Pt voided and tolerated fluids and food. Procedural sites remain dry and intact with good circulation, motion, and sensation. No signs and symptoms of bleeding/hematoma noted. Pt denies any pain or discomfort at this time. IV access removed  Instruction provided, patient/family verbalizes understanding. Dr. Napoleon Fiore spoke with patient/family post procedure.      Pt discharge via wheelchair to [facility] [address] - patient's son at bedside and will be driving patient home    Follow up Appointment: Thursday, November 10th at [facility] with device nurse    New Prescription: clindamycin, sotalol

## 2022-12-29 ENCOUNTER — OFFICE VISIT (OUTPATIENT)
Dept: OPHTHALMOLOGY | Facility: CLINIC | Age: 82
End: 2022-12-29
Payer: MEDICARE

## 2022-12-29 DIAGNOSIS — H40.1131 PRIMARY OPEN ANGLE GLAUCOMA (POAG) OF BOTH EYES, MILD STAGE: Primary | ICD-10-CM

## 2022-12-29 PROCEDURE — 1126F AMNT PAIN NOTED NONE PRSNT: CPT | Performed by: OPHTHALMOLOGY

## 2022-12-29 PROCEDURE — 99213 OFFICE O/P EST LOW 20 MIN: CPT | Performed by: OPHTHALMOLOGY

## 2022-12-29 NOTE — PATIENT INSTRUCTIONS
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 Will continue same regimen of Latanoprost in both eyes at bedtime.      Will have patient back in 4 months for a visual field, OCT and dilated eye exam.

## 2023-03-22 ENCOUNTER — OFFICE VISIT (OUTPATIENT)
Dept: INTERNAL MEDICINE CLINIC | Facility: CLINIC | Age: 83
End: 2023-03-22

## 2023-03-22 VITALS
WEIGHT: 207 LBS | BODY MASS INDEX: 30.66 KG/M2 | DIASTOLIC BLOOD PRESSURE: 62 MMHG | HEIGHT: 69 IN | SYSTOLIC BLOOD PRESSURE: 134 MMHG | HEART RATE: 60 BPM

## 2023-03-22 DIAGNOSIS — D69.6 THROMBOCYTOPENIA (HCC): ICD-10-CM

## 2023-03-22 DIAGNOSIS — I48.0 PAROXYSMAL ATRIAL FIBRILLATION (HCC): ICD-10-CM

## 2023-03-22 DIAGNOSIS — N18.30 STAGE 3 CHRONIC KIDNEY DISEASE, UNSPECIFIED WHETHER STAGE 3A OR 3B CKD (HCC): ICD-10-CM

## 2023-03-22 DIAGNOSIS — R53.82 CHRONIC FATIGUE: ICD-10-CM

## 2023-03-22 DIAGNOSIS — D75.89 MACROCYTOSIS: ICD-10-CM

## 2023-03-22 DIAGNOSIS — I10 ESSENTIAL HYPERTENSION: Primary | ICD-10-CM

## 2023-03-22 PROCEDURE — 1126F AMNT PAIN NOTED NONE PRSNT: CPT | Performed by: INTERNAL MEDICINE

## 2023-03-22 PROCEDURE — 99214 OFFICE O/P EST MOD 30 MIN: CPT | Performed by: INTERNAL MEDICINE

## 2023-03-22 RX ORDER — SOTALOL HYDROCHLORIDE 160 MG/1
160 TABLET ORAL DAILY
COMMUNITY
Start: 2022-11-16

## 2023-03-22 NOTE — PATIENT INSTRUCTIONS
Your blood pressure today was good at 134/62. Please continue current medications, remaining off lisinopril. Obtain blood tests in April. Please schedule a Medicare physical in July.

## 2023-04-04 RX ORDER — ATORVASTATIN CALCIUM 20 MG/1
20 TABLET, FILM COATED ORAL DAILY
Qty: 90 TABLET | Refills: 1 | Status: SHIPPED | OUTPATIENT
Start: 2023-04-04

## 2023-04-04 RX ORDER — AMLODIPINE BESYLATE 5 MG/1
5 TABLET ORAL DAILY
Qty: 90 TABLET | Refills: 1 | Status: SHIPPED | OUTPATIENT
Start: 2023-04-04

## 2023-05-02 ENCOUNTER — OFFICE VISIT (OUTPATIENT)
Dept: OPHTHALMOLOGY | Facility: CLINIC | Age: 83
End: 2023-05-02

## 2023-05-02 DIAGNOSIS — H40.1131 PRIMARY OPEN ANGLE GLAUCOMA (POAG) OF BOTH EYES, MILD STAGE: ICD-10-CM

## 2023-05-02 DIAGNOSIS — H43.393 VITREOUS FLOATERS OF BOTH EYES: ICD-10-CM

## 2023-05-02 DIAGNOSIS — H25.13 AGE-RELATED NUCLEAR CATARACT OF BOTH EYES: Primary | ICD-10-CM

## 2023-05-02 PROCEDURE — 1126F AMNT PAIN NOTED NONE PRSNT: CPT | Performed by: OPHTHALMOLOGY

## 2023-05-02 PROCEDURE — 92014 COMPRE OPH EXAM EST PT 1/>: CPT | Performed by: OPHTHALMOLOGY

## 2023-05-02 PROCEDURE — 92083 EXTENDED VISUAL FIELD XM: CPT | Performed by: OPHTHALMOLOGY

## 2023-05-02 PROCEDURE — 92015 DETERMINE REFRACTIVE STATE: CPT | Performed by: OPHTHALMOLOGY

## 2023-05-02 PROCEDURE — 92133 CPTRZD OPH DX IMG PST SGM ON: CPT | Performed by: OPHTHALMOLOGY

## 2023-05-02 NOTE — ASSESSMENT & PLAN NOTE
Discussed moderate cataracts in both eyes that are affecting vision but are not surgical at this time. New glasses today; update as needed.

## 2023-05-02 NOTE — PATIENT INSTRUCTIONS
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 Intraocular pressure stable, tolerating medications. Will continue same regimen of Latanoprost in both eyes at bedtime. Will have patient back in 4 months for a pressure check    Age-related nuclear cataract of both eyes  Discussed moderate cataracts in both eyes that are affecting vision but are not surgical at this time. New glasses today; update as needed. Vitreous floaters of both eyes   There is no evidence of retinal pathology. All signs and symptoms of retinal detachment/tears explained in detail. Patient instructed to call the office if they experience increase in floaters, increase in flashes of light, loss of vision or curtain or veil effect.

## 2023-05-22 ENCOUNTER — LAB ENCOUNTER (OUTPATIENT)
Dept: LAB | Facility: HOSPITAL | Age: 83
End: 2023-05-22
Attending: INTERNAL MEDICINE
Payer: MEDICARE

## 2023-05-22 DIAGNOSIS — Z79.01 LONG TERM (CURRENT) USE OF ANTICOAGULANTS: Primary | ICD-10-CM

## 2023-05-22 DIAGNOSIS — I25.10 CORONARY ATHEROSCLEROSIS OF NATIVE CORONARY ARTERY: ICD-10-CM

## 2023-05-22 DIAGNOSIS — R53.82 CHRONIC FATIGUE: ICD-10-CM

## 2023-05-22 DIAGNOSIS — I10 ESSENTIAL HYPERTENSION, MALIGNANT: ICD-10-CM

## 2023-05-22 DIAGNOSIS — D75.89 MACROCYTOSIS: ICD-10-CM

## 2023-05-22 DIAGNOSIS — E78.5 HYPERLIPEMIA: ICD-10-CM

## 2023-05-22 LAB
ALBUMIN SERPL-MCNC: 3.6 G/DL (ref 3.4–5)
ALBUMIN/GLOB SERPL: 1.1 {RATIO} (ref 1–2)
ALP LIVER SERPL-CCNC: 67 U/L
ALT SERPL-CCNC: 22 U/L
ANION GAP SERPL CALC-SCNC: 5 MMOL/L (ref 0–18)
AST SERPL-CCNC: 21 U/L (ref 15–37)
BASOPHILS # BLD AUTO: 0.04 X10(3) UL (ref 0–0.2)
BASOPHILS NFR BLD AUTO: 0.9 %
BILIRUB SERPL-MCNC: 0.9 MG/DL (ref 0.1–2)
BUN BLD-MCNC: 35 MG/DL (ref 7–18)
BUN/CREAT SERPL: 29.7 (ref 10–20)
CALCIUM BLD-MCNC: 9.8 MG/DL (ref 8.5–10.1)
CHLORIDE SERPL-SCNC: 108 MMOL/L (ref 98–112)
CHOLEST SERPL-MCNC: 171 MG/DL (ref ?–200)
CO2 SERPL-SCNC: 27 MMOL/L (ref 21–32)
CREAT BLD-MCNC: 1.18 MG/DL
DEPRECATED RDW RBC AUTO: 47.1 FL (ref 35.1–46.3)
EOSINOPHIL # BLD AUTO: 0.12 X10(3) UL (ref 0–0.7)
EOSINOPHIL NFR BLD AUTO: 2.7 %
ERYTHROCYTE [DISTWIDTH] IN BLOOD BY AUTOMATED COUNT: 12.5 % (ref 11–15)
FASTING PATIENT LIPID ANSWER: YES
FASTING STATUS PATIENT QL REPORTED: YES
FOLATE SERPL-MCNC: 8.9 NG/ML (ref 8.7–?)
GFR SERPLBLD BASED ON 1.73 SQ M-ARVRAT: 62 ML/MIN/1.73M2 (ref 60–?)
GLOBULIN PLAS-MCNC: 3.4 G/DL (ref 2.8–4.4)
GLUCOSE BLD-MCNC: 106 MG/DL (ref 70–99)
HCT VFR BLD AUTO: 40.5 %
HDLC SERPL-MCNC: 108 MG/DL (ref 40–59)
HGB BLD-MCNC: 13.5 G/DL
IMM GRANULOCYTES # BLD AUTO: 0.01 X10(3) UL (ref 0–1)
IMM GRANULOCYTES NFR BLD: 0.2 %
LDLC SERPL CALC-MCNC: 51 MG/DL (ref ?–100)
LYMPHOCYTES # BLD AUTO: 0.74 X10(3) UL (ref 1–4)
LYMPHOCYTES NFR BLD AUTO: 16.8 %
MCH RBC QN AUTO: 33.8 PG (ref 26–34)
MCHC RBC AUTO-ENTMCNC: 33.3 G/DL (ref 31–37)
MCV RBC AUTO: 101.5 FL
MONOCYTES # BLD AUTO: 0.63 X10(3) UL (ref 0.1–1)
MONOCYTES NFR BLD AUTO: 14.3 %
NEUTROPHILS # BLD AUTO: 2.86 X10 (3) UL (ref 1.5–7.7)
NEUTROPHILS # BLD AUTO: 2.86 X10(3) UL (ref 1.5–7.7)
NEUTROPHILS NFR BLD AUTO: 65.1 %
NONHDLC SERPL-MCNC: 63 MG/DL (ref ?–130)
OSMOLALITY SERPL CALC.SUM OF ELEC: 298 MOSM/KG (ref 275–295)
PLATELET # BLD AUTO: 181 10(3)UL (ref 150–450)
POTASSIUM SERPL-SCNC: 4.5 MMOL/L (ref 3.5–5.1)
PROT SERPL-MCNC: 7 G/DL (ref 6.4–8.2)
RBC # BLD AUTO: 3.99 X10(6)UL
SODIUM SERPL-SCNC: 140 MMOL/L (ref 136–145)
TRIGL SERPL-MCNC: 58 MG/DL (ref 30–149)
TSI SER-ACNC: 1.68 MIU/ML (ref 0.36–3.74)
VIT B12 SERPL-MCNC: 309 PG/ML (ref 193–986)
VLDLC SERPL CALC-MCNC: 8 MG/DL (ref 0–30)
WBC # BLD AUTO: 4.4 X10(3) UL (ref 4–11)

## 2023-05-22 PROCEDURE — 85025 COMPLETE CBC W/AUTO DIFF WBC: CPT

## 2023-05-22 PROCEDURE — 82746 ASSAY OF FOLIC ACID SERUM: CPT

## 2023-05-22 PROCEDURE — 82607 VITAMIN B-12: CPT

## 2023-05-22 PROCEDURE — 36415 COLL VENOUS BLD VENIPUNCTURE: CPT

## 2023-05-22 PROCEDURE — 80061 LIPID PANEL: CPT

## 2023-05-22 PROCEDURE — 84443 ASSAY THYROID STIM HORMONE: CPT

## 2023-05-22 PROCEDURE — 80053 COMPREHEN METABOLIC PANEL: CPT

## 2023-06-01 NOTE — TELEPHONE ENCOUNTER
I left a message for patient. Dr. Nithin Feldman sent his Latanoprost to [facility] on 9/19/22. I explained to patient that he has enough refills for 1 year (until 9/2023).   I told patient to call back if he has any questions or if he does need a refill for Latanoprost.

## 2023-06-02 RX ORDER — LATANOPROST 50 UG/ML
1 SOLUTION/ DROPS OPHTHALMIC NIGHTLY
Qty: 3 EACH | Refills: 3 | Status: SHIPPED | OUTPATIENT
Start: 2023-06-02

## 2023-06-02 NOTE — TELEPHONE ENCOUNTER
Patient calling back. States he does need more medication. Patient is expecting a call back.   Please advise     latanoprost 0.005 % Ophthalmic Solution

## 2023-06-02 NOTE — TELEPHONE ENCOUNTER
I spoke to [address] [place] [address], patient does not have any refills let for Latanoprost.  I will pend order to Dr. Watson Ashton.       LDE: 5/02/23  Last visit: 5/02/23  Due for: IOP check  Upcoming visit: 9/06/23

## 2023-08-30 ENCOUNTER — OFFICE VISIT (OUTPATIENT)
Dept: INTERNAL MEDICINE CLINIC | Facility: CLINIC | Age: 83
End: 2023-08-30

## 2023-08-30 VITALS
HEIGHT: 69 IN | SYSTOLIC BLOOD PRESSURE: 146 MMHG | BODY MASS INDEX: 30.42 KG/M2 | WEIGHT: 205.38 LBS | DIASTOLIC BLOOD PRESSURE: 62 MMHG | HEART RATE: 60 BPM

## 2023-08-30 DIAGNOSIS — Z00.00 ENCOUNTER FOR ANNUAL HEALTH EXAMINATION: ICD-10-CM

## 2023-08-30 DIAGNOSIS — D69.6 THROMBOCYTOPENIA (HCC): ICD-10-CM

## 2023-08-30 DIAGNOSIS — I48.0 PAROXYSMAL ATRIAL FIBRILLATION (HCC): ICD-10-CM

## 2023-08-30 DIAGNOSIS — I25.10 ASHD (ARTERIOSCLEROTIC HEART DISEASE): ICD-10-CM

## 2023-08-30 DIAGNOSIS — I10 ESSENTIAL HYPERTENSION: ICD-10-CM

## 2023-08-30 DIAGNOSIS — I27.20 PULMONARY HYPERTENSION (HCC): ICD-10-CM

## 2023-08-30 DIAGNOSIS — E78.00 HYPERCHOLESTEROLEMIA: ICD-10-CM

## 2023-08-30 DIAGNOSIS — I70.0 AORTIC ATHEROSCLEROSIS (HCC): ICD-10-CM

## 2023-08-30 DIAGNOSIS — Z00.00 ANNUAL PHYSICAL EXAM: Primary | ICD-10-CM

## 2023-08-30 DIAGNOSIS — N18.30 STAGE 3 CHRONIC KIDNEY DISEASE, UNSPECIFIED WHETHER STAGE 3A OR 3B CKD (HCC): ICD-10-CM

## 2023-08-30 DIAGNOSIS — I44.1 SECOND DEGREE AV BLOCK: ICD-10-CM

## 2023-08-30 PROCEDURE — 99213 OFFICE O/P EST LOW 20 MIN: CPT | Performed by: INTERNAL MEDICINE

## 2023-08-30 PROCEDURE — 1126F AMNT PAIN NOTED NONE PRSNT: CPT | Performed by: INTERNAL MEDICINE

## 2023-08-30 PROCEDURE — G0439 PPPS, SUBSEQ VISIT: HCPCS | Performed by: INTERNAL MEDICINE

## 2023-08-30 RX ORDER — AMLODIPINE BESYLATE 10 MG/1
10 TABLET ORAL DAILY
Qty: 90 TABLET | Refills: 1 | Status: SHIPPED | OUTPATIENT
Start: 2023-08-30

## 2023-09-06 ENCOUNTER — OFFICE VISIT (OUTPATIENT)
Dept: OPHTHALMOLOGY | Facility: CLINIC | Age: 83
End: 2023-09-06

## 2023-09-06 DIAGNOSIS — H40.1131 PRIMARY OPEN ANGLE GLAUCOMA (POAG) OF BOTH EYES, MILD STAGE: Primary | ICD-10-CM

## 2023-09-06 DIAGNOSIS — H25.13 AGE-RELATED NUCLEAR CATARACT OF BOTH EYES: ICD-10-CM

## 2023-09-06 PROCEDURE — 1126F AMNT PAIN NOTED NONE PRSNT: CPT | Performed by: OPHTHALMOLOGY

## 2023-09-06 PROCEDURE — 99213 OFFICE O/P EST LOW 20 MIN: CPT | Performed by: OPHTHALMOLOGY

## 2023-09-06 NOTE — PROGRESS NOTES
Jeraldene Angelucci is a 81year old male. HPI:     HPI    Patient is here for an IOP check. He is taking Latanoprost OU at bedtime as directed. He still complains of foggy vision in the right eye. Last edited by Audelia Piedra OT on 9/6/2023 10:44 AM.        Patient History:  Past Medical History:   Diagnosis Date    Aortic atherosclerosis ([address]     CXR 5-14    ASHD (arteriosclerotic heart disease)     Cardiac CT angiography 9-15 with 40-60% mid LAD stenosis; Lexiscan GXT 8-22 with small fixed apical inferolateral perfusion defect likely artifactual, with EF 30%    BPH (benign prostatic hyperplasia)     Chronic kidney disease, stage 3 (HCC)     Essential hypertension     Glaucoma 12/20/2017 12/20/17 Started Latanoprost OS qhs due to OCT thinning OS 12/13/18 Start Latanprost qhs OD due to thinning of RNFL OD     Hypercholesterolemia     Left ventricular hypertrophy     Echo 7-18 with mild LVH, normal LV function, EF 55-60%, mild MR, severe LAE, mild TR with peak pulmonary artery pressure 38 mmHg    Paroxysmal atrial fibrillation (HCC)     Status post ablation 11-22    Pulmonary hypertension ([address]     Echo 8-22 with normal LV function, EF 53%, mild LAE, mild MR, moderate pulmonary hypertension with PAP 52 mmHg    Second degree AV block     Status post pacemaker placement 5-14    Thrombocytopenia (HCC)        Surgical History: Jeraldene Angelucci has a past surgical history that includes tonsillectomy (Childhood); appendectomy (1968); Cardiac pacemaker placement (May 2014); ep cardioversion 1x (8/16/2021) ( ); ep cardioversion 1x (9/22/2021) ( ); ep pulmonary vein/a-fib ablation (11/4/2022) ( ); ep a-flutter ablation (11/4/2022) ( ); and ep generator change (11/4/2022) ( ).     Family History   Problem Relation Age of Onset    Diabetes Father     Diabetes Paternal Aunt     Hypertension Mother     Other (CVA) Mother         Age 50    Colon Cancer Paternal Grandmother     Other (Lung Cancer) Paternal Grandfather Macular degeneration Cousin     Glaucoma Neg        Social History:   Social History     Socioeconomic History    Marital status:    Occupational History    Occupation: Retired dentist   Tobacco Use    Smoking status: Former     Packs/day: 0.10     Years: 28.00     Pack years: 2.80     Types: Cigarettes     Quit date: 1998     Years since quittin.6    Smokeless tobacco: Never   Vaping Use    Vaping Use: Never used   Substance and Sexual Activity    Alcohol use: Yes     Alcohol/week: 0.0 standard drinks of alcohol     Comment: 2 glasses of wine most days    Drug use: No   Other Topics Concern    Caffeine Concern Yes     Comment: 6cups        Medications:  Current Outpatient Medications   Medication Sig Dispense Refill    amLODIPine 10 MG Oral Tab Take 1 tablet (10 mg total) by mouth daily. 90 tablet 1    latanoprost 0.005 % Ophthalmic Solution Place 1 drop into both eyes nightly. Instill 1 drop by ophthalmic route every night into both eyes 3 each 3    atorvastatin 20 MG Oral Tab Take 1 tablet (20 mg total) by mouth daily. 90 tablet 1    Sotalol HCl, AF, 160 MG Oral Tab Take 160 mg by mouth daily. ELIQUIS 5 MG Oral Tab 1 tablet (5 mg total) 2 (two) times daily. Resume on  in morning. 5    finasteride 5 MG Oral Tab Take 1 tablet (5 mg total) by mouth daily. Vitamin D3 (VITAMIN D3) 1000 UNITS Oral Tab Take 1 tablet (1,000 Units total) by mouth daily. tamsulosin (FLOMAX) cap Take 1 capsule (0.4 mg total) by mouth daily.          Allergies:    Azithromycin Dihydr*    SWELLING  Meperidine Hcl  [De*    SWELLING  Penicillin G            RASH    ROS:       PHYSICAL EXAM:     Base Eye Exam       Visual Acuity (Snellen - Linear)         Right Left    Dist cc 20/40 +2 20/25 +2    Dist ph cc NI       Correction: Glasses              Tonometry (Applanation, 10:48 AM)         Right Left    Pressure 13 13              Pachymetry (02/10/2008)         Right Left    Thickness 549/- 1/2 547/0 Pupils         Pupils    Right PERRL    Left PERRL              Neuro/Psych       Oriented x3: Yes                  Slit Lamp and Fundus Exam       Slit Lamp Exam         Right Left    Lids/Lashes Dermatochalasis, Meibomian gland dysfunction Dermatochalasis, Meibomian gland dysfunction    Conjunctiva/Sclera Normal Normal    Cornea Clear- no K spindles Clear- no K spindles    Anterior Chamber Deep and quiet Deep and quiet    Iris No transillumination defects No transillumination defects              Fundus Exam         Right Left    Disc Sloping margin, Temporal crescent Sloping margin, Temporal crescent    C/D Ratio 0.7 0.6                  Refraction       Wearing Rx         Sphere Cylinder Axis Add    Right -4.25 +1.00 180 +2.75    Left -2.25 Sphere  +2.75      Age: 3m    Type: Trifocals                     ASSESSMENT/PLAN:     Diagnoses and Plan:     Primary open angle glaucoma (POAG) of both eyes, mild stage  Intraocular pressure stable, tolerating medications. Will continue same regimen of Latanoprost in both eyes at bedtime. Will have patient back in 4 months for a pressure check. Age-related nuclear cataract of both eyes  Discussed with patient that cataract in the right eye is advanced enough at this time to consider surgery; it would be patient's choice. Discussed options such as surgery or change of glasses RX. Discussed surgical risks, benefits, alternatives and recovery. Patient understands that changing glasses will not significantly improve vision and patient will consider surgery. As Dr. Mic Toledo is no longer performing cataract surgery, a referral to [facility] was offered. Patient will call our office if they would like to be referred. Information on [address] [place] [address] ophthalmology given and reviewed with the patient. No orders of the defined types were placed in this encounter.       Meds This Visit:  Requested Prescriptions      No prescriptions requested or ordered in this encounter        Follow up instructions:  Return in about 4 months (around 1/6/2024) for IOP check.     9/6/2023  Scribed by: Oralia Latif MD

## 2023-09-06 NOTE — ASSESSMENT & PLAN NOTE
Culture contaminated  Need to repeat   Intraocular pressure stable, tolerating medications. Will continue same regimen of Latanoprost in both eyes at bedtime. Will have patient back in 4 months for a pressure check.

## 2023-09-06 NOTE — ASSESSMENT & PLAN NOTE
Discussed with patient that cataract in the right eye is advanced enough at this time to consider surgery; it would be patient's choice. Discussed options such as surgery or change of glasses RX. Discussed surgical risks, benefits, alternatives and recovery. Patient understands that changing glasses will not significantly improve vision and patient will consider surgery. As Dr. Manuel Wheeler is no longer performing cataract surgery, a referral to [facility] was offered. Patient will call our office if they would like to be referred. Information on [address] [place] [address] ophthalmology given and reviewed with the patient.

## 2023-09-06 NOTE — PATIENT INSTRUCTIONS
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 Will continue same regimen of Latanoprost in both eyes at bedtime. Will have patient back in 4 months for a pressure check. Age-related nuclear cataract of both eyes  Discussed with patient that cataract in the right eye is advanced enough at this time to consider surgery; it would be patient's choice. Discussed options such as surgery or change of glasses RX. Discussed surgical risks, benefits, alternatives and recovery. Patient understands that changing glasses will not significantly improve vision and patient will consider surgery. As Dr. Lida Kessler is no longer performing cataract surgery, a referral to [facility] was offered. Patient will call our office if they would like to be referred. Information on [address] [place] [address] ophthalmology given and reviewed with the patient.

## 2023-09-19 RX ORDER — LATANOPROST 50 UG/ML
SOLUTION/ DROPS OPHTHALMIC
Qty: 2.5 ML | Refills: 0 | OUTPATIENT
Start: 2023-09-19

## 2023-09-19 NOTE — TELEPHONE ENCOUNTER
Per Earlene Winters thinks there was a mistake made when entering the number of refills, pt is out of refills.  Please advise

## 2023-09-19 NOTE — TELEPHONE ENCOUNTER
Called patient and let him know that [facility] refilled his Latanoprost in June'23 and he should just call the pharmacy.   EP

## 2023-09-22 RX ORDER — ATORVASTATIN CALCIUM 20 MG/1
20 TABLET, FILM COATED ORAL DAILY
Qty: 90 TABLET | Refills: 3 | Status: SHIPPED | OUTPATIENT
Start: 2023-09-22

## 2023-09-25 RX ORDER — LATANOPROST 50 UG/ML
1 SOLUTION/ DROPS OPHTHALMIC NIGHTLY
Qty: 7.5 ML | Refills: 3 | Status: SHIPPED | OUTPATIENT
Start: 2023-09-25

## 2023-09-25 NOTE — TELEPHONE ENCOUNTER
LDE: 5/2/23  Last visit: 9/6/23  Due for: IOP check  Upcoming visit: 1/17/24    Refill sent on 6/2/23 but pt claims pharmacy says there are no refills on file.  Re-sent refill request.     Routed to [facility]

## 2023-10-09 ENCOUNTER — OFFICE VISIT (OUTPATIENT)
Dept: INTERNAL MEDICINE CLINIC | Facility: CLINIC | Age: 83
End: 2023-10-09

## 2023-10-09 VITALS
HEIGHT: 69 IN | SYSTOLIC BLOOD PRESSURE: 128 MMHG | HEART RATE: 60 BPM | WEIGHT: 223.63 LBS | BODY MASS INDEX: 33.12 KG/M2 | DIASTOLIC BLOOD PRESSURE: 66 MMHG

## 2023-10-09 DIAGNOSIS — R60.0 LOCALIZED EDEMA: ICD-10-CM

## 2023-10-09 DIAGNOSIS — I10 ESSENTIAL HYPERTENSION: Primary | ICD-10-CM

## 2023-10-09 PROCEDURE — 99214 OFFICE O/P EST MOD 30 MIN: CPT | Performed by: INTERNAL MEDICINE

## 2023-10-09 PROCEDURE — 1126F AMNT PAIN NOTED NONE PRSNT: CPT | Performed by: INTERNAL MEDICINE

## 2023-10-09 RX ORDER — POTASSIUM CHLORIDE 1500 MG/1
20 TABLET, EXTENDED RELEASE ORAL DAILY
Qty: 30 TABLET | Refills: 0 | Status: SHIPPED | OUTPATIENT
Start: 2023-10-09 | End: 2023-11-08

## 2023-10-09 RX ORDER — FUROSEMIDE 40 MG/1
40 TABLET ORAL DAILY
Qty: 90 TABLET | Refills: 3 | Status: SHIPPED | OUTPATIENT
Start: 2023-10-09 | End: 2024-10-03

## 2023-10-09 NOTE — PATIENT INSTRUCTIONS
Begin furosemide 40 mg daily and KCl 20 meq daily. Continue other medications.   Please check a blood test in 2 weeks, and return to see me in 1 month

## 2023-10-10 ENCOUNTER — PATIENT MESSAGE (OUTPATIENT)
Dept: INTERNAL MEDICINE CLINIC | Facility: CLINIC | Age: 83
End: 2023-10-10

## 2023-10-10 NOTE — TELEPHONE ENCOUNTER
From: Vimal Scanlon  To: Angy Ortega  Sent: 10/10/2023 7:33 AM CDT  Subject: Lab test    The lab test which I was instructed to undergo in two weeks. ..is this a fasting test ?  Thanks

## 2023-10-22 ENCOUNTER — LAB ENCOUNTER (OUTPATIENT)
Dept: LAB | Facility: HOSPITAL | Age: 83
End: 2023-10-22
Attending: INTERNAL MEDICINE

## 2023-10-22 DIAGNOSIS — R60.0 LOCALIZED EDEMA: ICD-10-CM

## 2023-10-22 LAB
ANION GAP SERPL CALC-SCNC: 9 MMOL/L (ref 0–18)
BUN BLD-MCNC: 30 MG/DL (ref 7–18)
BUN/CREAT SERPL: 22.1 (ref 10–20)
CALCIUM BLD-MCNC: 9.2 MG/DL (ref 8.5–10.1)
CHLORIDE SERPL-SCNC: 106 MMOL/L (ref 98–112)
CO2 SERPL-SCNC: 29 MMOL/L (ref 21–32)
CREAT BLD-MCNC: 1.36 MG/DL
EGFRCR SERPLBLD CKD-EPI 2021: 52 ML/MIN/1.73M2 (ref 60–?)
FASTING STATUS PATIENT QL REPORTED: NO
GLUCOSE BLD-MCNC: 103 MG/DL (ref 70–99)
OSMOLALITY SERPL CALC.SUM OF ELEC: 304 MOSM/KG (ref 275–295)
POTASSIUM SERPL-SCNC: 4.3 MMOL/L (ref 3.5–5.1)
SODIUM SERPL-SCNC: 144 MMOL/L (ref 136–145)

## 2023-10-22 PROCEDURE — 80048 BASIC METABOLIC PNL TOTAL CA: CPT

## 2023-10-22 PROCEDURE — 36415 COLL VENOUS BLD VENIPUNCTURE: CPT

## 2023-10-30 ENCOUNTER — OFFICE VISIT (OUTPATIENT)
Dept: INTERNAL MEDICINE CLINIC | Facility: CLINIC | Age: 83
End: 2023-10-30

## 2023-10-30 VITALS
BODY MASS INDEX: 31.31 KG/M2 | DIASTOLIC BLOOD PRESSURE: 58 MMHG | HEART RATE: 60 BPM | WEIGHT: 211.38 LBS | SYSTOLIC BLOOD PRESSURE: 122 MMHG | HEIGHT: 69 IN

## 2023-10-30 DIAGNOSIS — R60.0 LOCALIZED EDEMA: Primary | ICD-10-CM

## 2023-10-30 DIAGNOSIS — I10 ESSENTIAL HYPERTENSION: ICD-10-CM

## 2023-10-30 PROCEDURE — 99214 OFFICE O/P EST MOD 30 MIN: CPT | Performed by: INTERNAL MEDICINE

## 2023-10-30 PROCEDURE — 1125F AMNT PAIN NOTED PAIN PRSNT: CPT | Performed by: INTERNAL MEDICINE

## 2023-11-24 ENCOUNTER — ORDER TRANSCRIPTION (OUTPATIENT)
Dept: ADMINISTRATIVE | Facility: HOSPITAL | Age: 83
End: 2023-11-24

## 2023-11-24 DIAGNOSIS — I48.19 PERSISTENT ATRIAL FIBRILLATION (HCC): ICD-10-CM

## 2023-11-24 DIAGNOSIS — R06.09 DOE (DYSPNEA ON EXERTION): ICD-10-CM

## 2023-11-24 DIAGNOSIS — I25.10 CAD (CORONARY ARTERY DISEASE): Primary | ICD-10-CM

## 2023-11-24 DIAGNOSIS — I44.2 THIRD DEGREE ATRIOVENTRICULAR BLOCK (HCC): ICD-10-CM

## 2023-11-24 DIAGNOSIS — Z98.890 PERSONAL HISTORY OF SURGERY TO HEART AND GREAT VESSELS, PRESENTING HAZARDS TO HEALTH: ICD-10-CM

## 2023-12-26 ENCOUNTER — HOSPITAL ENCOUNTER (OUTPATIENT)
Dept: CT IMAGING | Facility: HOSPITAL | Age: 83
Discharge: HOME | End: 2023-12-26
Attending: INTERNAL MEDICINE
Payer: MEDICARE

## 2023-12-26 VITALS
SYSTOLIC BLOOD PRESSURE: 147 MMHG | HEIGHT: 69 IN | DIASTOLIC BLOOD PRESSURE: 67 MMHG | BODY MASS INDEX: 29.62 KG/M2 | WEIGHT: 200 LBS | HEART RATE: 55 BPM | RESPIRATION RATE: 16 BRPM

## 2023-12-26 DIAGNOSIS — I44.2 THIRD DEGREE ATRIOVENTRICULAR BLOCK (HCC): ICD-10-CM

## 2023-12-26 DIAGNOSIS — I48.19 PERSISTENT ATRIAL FIBRILLATION (HCC): ICD-10-CM

## 2023-12-26 DIAGNOSIS — I25.10 CAD (CORONARY ARTERY DISEASE): ICD-10-CM

## 2023-12-26 DIAGNOSIS — R06.09 DOE (DYSPNEA ON EXERTION): ICD-10-CM

## 2023-12-26 DIAGNOSIS — Z98.890 PERSONAL HISTORY OF SURGERY TO HEART AND GREAT VESSELS, PRESENTING HAZARDS TO HEALTH: ICD-10-CM

## 2023-12-26 LAB
CREAT BLD-MCNC: 1.2 MG/DL
EGFRCR SERPLBLD CKD-EPI 2021: 60 ML/MIN/1.73M2 (ref 60–?)

## 2023-12-26 PROCEDURE — 75574 CT ANGIO HRT W/3D IMAGE: CPT | Performed by: INTERNAL MEDICINE

## 2023-12-26 PROCEDURE — 0503T CTA FRACTIONAL FLOW RESERVE ANALYSIS (CPT=0503T/0502T): CPT | Performed by: INTERNAL MEDICINE

## 2023-12-26 PROCEDURE — 82565 ASSAY OF CREATININE: CPT

## 2023-12-26 PROCEDURE — 0502T CTA FRACTIONAL FLOW RESERVE ANALYSIS (CPT=0503T/0502T): CPT | Performed by: INTERNAL MEDICINE

## 2023-12-26 RX ORDER — NITROGLYCERIN 0.4 MG/1
0.4 TABLET SUBLINGUAL ONCE
Status: COMPLETED | OUTPATIENT
Start: 2023-12-26 | End: 2023-12-26

## 2023-12-26 RX ORDER — POTASSIUM CHLORIDE 20 MEQ/1
20 TABLET, EXTENDED RELEASE ORAL DAILY
COMMUNITY
Start: 2023-12-13

## 2023-12-26 RX ADMIN — NITROGLYCERIN 0.4 MG: 0.4 TABLET SUBLINGUAL at 10:18:00

## 2023-12-26 NOTE — IMAGING NOTE
TO RAD HOLDING AT 0942       HX TAKEN: CAD, HOLM, a fib, third degree AV block    0950  [employer] pacemaker representative, Ling Oliveros, present via telephone to assist RN in programming patient's rate to lower rate of 55. Patient tolerated well. PT CONSENTED AT 1000    BASELINE VITAL SIGNS: HR 56  /67    CTA ORDERED BY DR. Evans Foil WAS PT GIVEN CTA PREMEDS NO    18 GAUGE IV STARTED AT 1004 POC TESTING COMPLETED GFR = 60   CREATINE = 1.2    TO CT TABLE @ 1015    CONNECT TO MONITOR  HR 55 BP  120/63      NITROGLYCERIN 0.4 MILLIGRAMS SUBLINGUAL GIVEN AT  1018     CALCIUM SCORE- NOT DONE PER PROTOCOL      INJECTION STARTED AT 1026 HR 55 DURING SCAN PROCEDURE COMPLETE    POST SCAN HR  55 /55  AT 1030    [facility] Ling Oliveros, present via telephone to assist RN in reprogramming patient's rate to original settings. Patient tolerated well. 1050 PT TO HOLDING AREA  VS  HR 60 /68     AVS  PROVIDED      1058  DISCHARGED HOME COMFORTABLY. Patient denies lightheadedness or dizziness at this time.

## 2024-01-15 RX ORDER — AMLODIPINE BESYLATE 5 MG/1
5 TABLET ORAL DAILY
Qty: 90 TABLET | Refills: 1 | Status: SHIPPED | OUTPATIENT
Start: 2024-01-15

## 2024-01-16 ENCOUNTER — PATIENT MESSAGE (OUTPATIENT)
Dept: INTERNAL MEDICINE CLINIC | Facility: CLINIC | Age: 84
End: 2024-01-16

## 2024-01-16 ENCOUNTER — OFFICE VISIT (OUTPATIENT)
Dept: INTERNAL MEDICINE CLINIC | Facility: CLINIC | Age: 84
End: 2024-01-16
Payer: MEDICARE

## 2024-01-16 VITALS
WEIGHT: 210 LBS | HEIGHT: 69 IN | SYSTOLIC BLOOD PRESSURE: 124 MMHG | HEART RATE: 72 BPM | DIASTOLIC BLOOD PRESSURE: 62 MMHG | BODY MASS INDEX: 31.1 KG/M2

## 2024-01-16 DIAGNOSIS — I70.0 AORTIC ATHEROSCLEROSIS (HCC): ICD-10-CM

## 2024-01-16 DIAGNOSIS — I10 ESSENTIAL HYPERTENSION: ICD-10-CM

## 2024-01-16 DIAGNOSIS — I25.10 ASHD (ARTERIOSCLEROTIC HEART DISEASE): Primary | ICD-10-CM

## 2024-01-16 DIAGNOSIS — E78.00 HYPERCHOLESTEROLEMIA: Primary | ICD-10-CM

## 2024-01-16 PROCEDURE — G0008 ADMIN INFLUENZA VIRUS VAC: HCPCS | Performed by: INTERNAL MEDICINE

## 2024-01-16 PROCEDURE — 1126F AMNT PAIN NOTED NONE PRSNT: CPT | Performed by: INTERNAL MEDICINE

## 2024-01-16 PROCEDURE — 99214 OFFICE O/P EST MOD 30 MIN: CPT | Performed by: INTERNAL MEDICINE

## 2024-01-16 PROCEDURE — 90653 IIV ADJUVANT VACCINE IM: CPT | Performed by: INTERNAL MEDICINE

## 2024-01-16 NOTE — PATIENT INSTRUCTIONS
Your blood pressure today was excellent at 124/62.  You received a flu shot today.  Please get an updated COVID booster at your pharmacy.  Continue current medications and please schedule a Medicare physical in August.

## 2024-01-16 NOTE — PROGRESS NOTES
Alberto Bray is a 84 year old male.   Chief Complaint   Patient presents with    Hypertension     HPI:   Minh presents this morning for follow-up of chronic medical conditions including hypertension and ASHD.  At his last visit in October, dose of amlodipine was decreased.    He checks his blood pressure daily and readings are typically 130s/70s.  He feels well.  Lower leg swelling is stable and manageable.  No shortness of breath at rest or with activity.  No orthopnea or PND.  No headaches.  No lightheadedness or dizziness.  No palpitations or chest pain.  Weight has been stable.    He continues to see Dr. Boyer of Cardiology regularly.  Echo recently done, apparently normal though report not available.  Last month he had a CTA of the coronary arteries, revealing less than 20% left main stenosis and diffuse calcified plaquing of the RCA and LAD with FFRs normal.    Medications reviewed, as listed below.  He has not yet received a flu shot or the most recent COVID booster.  Dr. Boyer has ordered labs which will be done soon, and he will send us a [employer] message detailing lab orders as I would like to have electrolytes and renal function checked.  Current Outpatient Medications   Medication Sig Dispense Refill    amLODIPine 5 MG Oral Tab Take 1 tablet (5 mg total) by mouth daily. 90 tablet 1    potassium chloride 20 MEQ Oral Tab CR Take 1 tablet (20 mEq total) by mouth daily.      furosemide 40 MG Oral Tab Take 1 tablet (40 mg total) by mouth daily. 90 tablet 3    latanoprost 0.005 % Ophthalmic Solution Place 1 drop into both eyes nightly. Instill 1 drop by ophthalmic route every night into both eyes 7.5 mL 3    atorvastatin 20 MG Oral Tab Take 1 tablet (20 mg total) by mouth daily. 90 tablet 3    Sotalol HCl, AF, 160 MG Oral Tab Take 160 mg by mouth daily.      ELIQUIS 5 MG Oral Tab 1 tablet (5 mg total) 2 (two) times daily. Resume on 11/5 in morning.  5    finasteride 5 MG Oral Tab Take 1 tablet (5 mg  total) by mouth daily.      Vitamin D3 (VITAMIN D3) 1000 UNITS Oral Tab Take 1 tablet (1,000 Units total) by mouth daily.      tamsulosin (FLOMAX) cap Take 1 capsule (0.4 mg total) by mouth daily.       Allergies   Allergen Reactions    Azithromycin Dihydrate [Azithromycin] SWELLING    Meperidine Hcl  [Demerol] SWELLING    Penicillin G RASH      Past Medical History:   Diagnosis Date    Aortic atherosclerosis (HCC)     CXR 5-14    ASHD (arteriosclerotic heart disease)     Cardiac CT angiography 9-15 with 40-60% mid LAD stenosis; Lexiscan GXT 8-22 with small fixed apical inferolateral perfusion defect likely artifactual, with EF 30%; CTA coronary arteries 12-23 with less than 20% LMT stenosis, diffuse calcified plaque RCA and LAD, with normal FFR    BPH (benign prostatic hyperplasia)     Chronic kidney disease, stage 3 ([facility])     Essential hypertension     Glaucoma 12/20/2017 12/20/17 Started Latanoprost OS qhs due to OCT thinning OS 12/13/18 Start Latanprost qhs OD due to thinning of RNFL OD     Hypercholesterolemia     Left ventricular hypertrophy     Echo 7-18 with mild LVH, normal LV function, EF 55-60%, mild MR, severe LAE, mild TR with peak pulmonary artery pressure 38 mmHg    Paroxysmal atrial fibrillation ([facility])     Status post ablation 11-22    Pulmonary hypertension ([facility])     Echo 8-22 with normal LV function, EF 53%, mild LAE, mild MR, moderate pulmonary hypertension with PAP 52 mmHg    Second degree AV block     Status post pacemaker placement 5-14    Thrombocytopenia ([facility])      Past Surgical History:   Procedure Laterality Date    APPENDECTOMY  1968    CARDIAC PACEMAKER PLACEMENT  05/2014    CATARACT EXTRACTION W/ INTRAOCULAR LENS IMPLANT Right 12/13/2023    Dr. Gar @ [facility]    EP A-FLUTTER ABLATION  11/04/2022         EP CARDIOVERSION 1X  08/16/2021         EP CARDIOVERSION 1X  09/22/2021         EP GENERATOR CHANGE  11/04/2022         EP PULMONARY VEIN/A-FIB ABLATION  11/04/2022         TONSILLECTOMY   Childhood      Social History:  Social History     Socioeconomic History    Marital status:    Occupational History    Occupation: Retired dentist   Tobacco Use    Smoking status: Former     Packs/day: 0.10     Years: 28.00     Additional pack years: 0.00     Total pack years: 2.80     Types: Cigarettes     Quit date: 1998     Years since quittin.0    Smokeless tobacco: Never   Vaping Use    Vaping Use: Never used   Substance and Sexual Activity    Alcohol use: Yes     Alcohol/week: 0.0 standard drinks of alcohol     Comment: 2 glasses of wine most days    Drug use: No   Other Topics Concern    Caffeine Concern Yes     Comment: 6cups         EXAM:   GENERAL: Pleasant male appearing well in no distress  /62   Pulse 72   Ht 5' 9\" (1.753 m)   Wt 210 lb (95.3 kg)   BMI 31.01 kg/m²   LUNGS: Resonant to percussion and clear to auscultation without crackles or wheezes  CARDIAC: Rhythm regular S1 S2 normal without murmur, with 2+ pitting edema distal lower extremities bilaterally left greater than right  ABDOMEN: Bowel sounds normal soft nontender       ASSESSMENT AND PLAN:   1. Essential hypertension  Well-controlled.  High-dose influenza vaccine today.  Also recommend updated COVID booster.  Continue current medications.  Medicare physical in August.    2. ASHD (arteriosclerotic heart disease)  Asymptomatic, with recent CTA coronary arteries without significant stenosis      The patient indicates understanding of these issues and agrees to the plan.  The patient is asked to return in August.    Honorio Flores MD  2024  10:07 AM

## 2024-01-17 ENCOUNTER — OFFICE VISIT (OUTPATIENT)
Dept: OPHTHALMOLOGY | Facility: CLINIC | Age: 84
End: 2024-01-17
Payer: MEDICARE

## 2024-01-17 DIAGNOSIS — H40.1131 PRIMARY OPEN ANGLE GLAUCOMA (POAG) OF BOTH EYES, MILD STAGE: Primary | ICD-10-CM

## 2024-01-17 PROCEDURE — 99213 OFFICE O/P EST LOW 20 MIN: CPT | Performed by: OPHTHALMOLOGY

## 2024-01-17 NOTE — PATIENT INSTRUCTIONS
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  Will continue same regimen of Latanoprost in both eyes at bedtime.     Will have patient back in 4 months for a visual field, OCT, complete exam and photos

## 2024-01-17 NOTE — PROGRESS NOTES
Alberto Bray is a 84 year old male.    HPI:     HPI    Pt is here for 4 month IOP check.  Pt had cataract surgery right eye by  about 2 weeks ago.  Pt states he will be going back to him for new prescription for glasses.  Pt is taking Latanoprost at bedtime in both eyes.  Pt finished his cataract drops on Jan.15th, 2024.      Last edited by Sylvia Oliver O.DENISE on 1/17/2024 10:58 AM.        Patient History:  Past Medical History:   Diagnosis Date    Aortic atherosclerosis ([facility])     CXR 5-14    ASHD (arteriosclerotic heart disease)     Cardiac CT angiography 9-15 with 40-60% mid LAD stenosis; Lexiscan GXT 8-22 with small fixed apical inferolateral perfusion defect likely artifactual, with EF 30%; CTA coronary arteries 12-23 with less than 20% LMT stenosis, diffuse calcified plaque RCA and LAD, with normal FFR    BPH (benign prostatic hyperplasia)     Chronic kidney disease, stage 3 ([facility])     Essential hypertension     Glaucoma 12/20/2017 12/20/17 Started Latanoprost OS qhs due to OCT thinning OS 12/13/18 Start Latanprost qhs OD due to thinning of RNFL OD     Hypercholesterolemia     Left ventricular hypertrophy     Echo 7-18 with mild LVH, normal LV function, EF 55-60%, mild MR, severe LAE, mild TR with peak pulmonary artery pressure 38 mmHg    Paroxysmal atrial fibrillation ([facility])     Status post ablation 11-22    Pulmonary hypertension (HCC)     Echo 8-22 with normal LV function, EF 53%, mild LAE, mild MR, moderate pulmonary hypertension with PAP 52 mmHg    Second degree AV block     Status post pacemaker placement 5-14    Thrombocytopenia ([facility])        Surgical History: Alberto Bray has a past surgical history that includes tonsillectomy (Childhood); appendectomy (1968); Cardiac pacemaker placement (05/2014); ep cardioversion 1x (08/16/2021) ( ); ep cardioversion 1x (09/22/2021) ( ); ep pulmonary vein/a-fib ablation (11/04/2022) ( ); ep a-flutter ablation (11/04/2022) ( ); ep generator  change (2022) ( ); and Cataract extraction w/ intraocular lens implant (Right, 2023) (Dr. Gar @ [facility]).    Family History   Problem Relation Age of Onset    Diabetes Father     Diabetes Paternal Aunt     Hypertension Mother     Other (CVA) Mother         Age 48    Colon Cancer Paternal Grandmother     Other (Lung Cancer) Paternal Grandfather     Macular degeneration Cousin     Glaucoma Neg        Social History:   Social History     Socioeconomic History    Marital status:    Occupational History    Occupation: Retired dentist   Tobacco Use    Smoking status: Former     Packs/day: 0.10     Years: 28.00     Additional pack years: 0.00     Total pack years: 2.80     Types: Cigarettes     Quit date: 1998     Years since quittin.0    Smokeless tobacco: Never   Vaping Use    Vaping Use: Never used   Substance and Sexual Activity    Alcohol use: Yes     Alcohol/week: 0.0 standard drinks of alcohol     Comment: 2 glasses of wine most days    Drug use: No   Other Topics Concern    Caffeine Concern Yes     Comment: 6cups        Medications:  Current Outpatient Medications   Medication Sig Dispense Refill    amLODIPine 5 MG Oral Tab Take 1 tablet (5 mg total) by mouth daily. 90 tablet 1    potassium chloride 20 MEQ Oral Tab CR Take 1 tablet (20 mEq total) by mouth daily.      furosemide 40 MG Oral Tab Take 1 tablet (40 mg total) by mouth daily. 90 tablet 3    latanoprost 0.005 % Ophthalmic Solution Place 1 drop into both eyes nightly. Instill 1 drop by ophthalmic route every night into both eyes 7.5 mL 3    atorvastatin 20 MG Oral Tab Take 1 tablet (20 mg total) by mouth daily. 90 tablet 3    Sotalol HCl, AF, 160 MG Oral Tab Take 160 mg by mouth daily.      ELIQUIS 5 MG Oral Tab 1 tablet (5 mg total) 2 (two) times daily. Resume on  in morning.  5    finasteride 5 MG Oral Tab Take 1 tablet (5 mg total) by mouth daily.      Vitamin D3 (VITAMIN D3) 1000 UNITS Oral Tab Take 1 tablet (1,000 Units  total) by mouth daily.      tamsulosin (FLOMAX) cap Take 1 capsule (0.4 mg total) by mouth daily.         Allergies:  Allergies   Allergen Reactions    Azithromycin Dihydrate [Azithromycin] SWELLING    Meperidine Hcl  [Demerol] SWELLING    Penicillin G RASH       ROS:       PHYSICAL EXAM:     Base Eye Exam       Visual Acuity (Snellen - Linear)         Right Left    Dist cc 20/25 -2 20/25 -1      Correction: Glasses              Tonometry (Applanation, 11:08 AM)         Right Left    Pressure 12 12              Pachymetry (02/10/2008)         Right Left    Thickness 549/- 1/2 547/0              Pupils         Pupils    Right PERRL    Left PERRL              Neuro/Psych       Oriented x3: Yes                  Slit Lamp and Fundus Exam       Slit Lamp Exam         Right Left    Lids/Lashes Dermatochalasis, Meibomian gland dysfunction Dermatochalasis, Meibomian gland dysfunction    Conjunctiva/Sclera Trace Injection Normal    Cornea Clear- no K spindles Clear- no K spindles    Anterior Chamber Deep and rare cell Deep and quiet    Iris No transillumination defects No transillumination defects    Lens PC IOL with few striae               Fundus Exam         Right Left    Disc Sloping margin, Temporal crescent Sloping margin, Temporal crescent    C/D Ratio 0.7 0.6                  Refraction       Wearing Rx         Sphere Cylinder Add    Right [place] Sphere     Left -2.25 Sphere +2.75      Type: Trifocals   Lens right eye of his glasses is plano until he has his refraction done by Dr. Gar.                     ASSESSMENT/PLAN:     Diagnoses and Plan:     Primary open angle glaucoma (POAG) of both eyes, mild stage  Intraocular pressure stable, tolerating medications.  Will continue same regimen of Latanoprost in both eyes at bedtime.     Will have patient back in 4 months for a visual field, OCT, complete exam and photos    Orders Placed This Encounter   Procedures    Tom Visual Field - OU - Both Eyes    OCT, Optic  Nerve - OU - Both Eyes    Fundus Photos - OU - Both Eyes       Meds This Visit:  Requested Prescriptions      No prescriptions requested or ordered in this encounter        Follow up instructions:  Return in about 4 months (around 5/17/2024) for Visual Field, OCT, complete exam, Photos.    1/17/2024  Scribed by: Giuseppe Melo MD

## 2024-01-17 NOTE — ASSESSMENT & PLAN NOTE
Intraocular pressure stable, tolerating medications.  Will continue same regimen of Latanoprost in both eyes at bedtime.     Will have patient back in 4 months for a visual field, OCT, complete exam and photos

## 2024-04-22 ENCOUNTER — LAB ENCOUNTER (OUTPATIENT)
Dept: LAB | Age: 84
End: 2024-04-22
Attending: INTERNAL MEDICINE
Payer: MEDICARE

## 2024-04-22 DIAGNOSIS — I10 ESSENTIAL HYPERTENSION: ICD-10-CM

## 2024-04-22 DIAGNOSIS — R06.09 DYSPNEA ON EXERTION: ICD-10-CM

## 2024-04-22 DIAGNOSIS — E78.5 DYSLIPIDEMIA: Primary | ICD-10-CM

## 2024-04-22 DIAGNOSIS — I48.19 PERSISTENT ATRIAL FIBRILLATION (HCC): ICD-10-CM

## 2024-04-22 LAB
BASOPHILS # BLD AUTO: 0.05 X10(3) UL (ref 0–0.2)
BASOPHILS NFR BLD AUTO: 1.1 %
DEPRECATED RDW RBC AUTO: 47.2 FL (ref 35.1–46.3)
EOSINOPHIL # BLD AUTO: 0.13 X10(3) UL (ref 0–0.7)
EOSINOPHIL NFR BLD AUTO: 2.9 %
ERYTHROCYTE [DISTWIDTH] IN BLOOD BY AUTOMATED COUNT: 14.6 % (ref 11–15)
HCT VFR BLD AUTO: 32.7 %
HGB BLD-MCNC: 10.3 G/DL
IMM GRANULOCYTES # BLD AUTO: 0.01 X10(3) UL (ref 0–1)
IMM GRANULOCYTES NFR BLD: 0.2 %
LYMPHOCYTES # BLD AUTO: 0.57 X10(3) UL (ref 1–4)
LYMPHOCYTES NFR BLD AUTO: 12.7 %
MCH RBC QN AUTO: 28.1 PG (ref 26–34)
MCHC RBC AUTO-ENTMCNC: 31.5 G/DL (ref 31–37)
MCV RBC AUTO: 89.3 FL
MONOCYTES # BLD AUTO: 0.67 X10(3) UL (ref 0.1–1)
MONOCYTES NFR BLD AUTO: 14.9 %
NEUTROPHILS # BLD AUTO: 3.06 X10 (3) UL (ref 1.5–7.7)
NEUTROPHILS # BLD AUTO: 3.06 X10(3) UL (ref 1.5–7.7)
NEUTROPHILS NFR BLD AUTO: 68.2 %
PLATELET # BLD AUTO: 242 10(3)UL (ref 150–450)
RBC # BLD AUTO: 3.66 X10(6)UL
WBC # BLD AUTO: 4.5 X10(3) UL (ref 4–11)

## 2024-04-22 PROCEDURE — 85025 COMPLETE CBC W/AUTO DIFF WBC: CPT

## 2024-04-22 PROCEDURE — 36415 COLL VENOUS BLD VENIPUNCTURE: CPT

## 2024-06-05 ENCOUNTER — TELEPHONE (OUTPATIENT)
Dept: OPHTHALMOLOGY | Facility: CLINIC | Age: 84
End: 2024-06-05

## 2024-06-05 NOTE — TELEPHONE ENCOUNTER
Per patient he needed to cancel and reschedule his appointment for EP/VF, OCT, EE and photos  and asking to reschedule after 6/20. Please advise

## 2024-06-05 NOTE — TELEPHONE ENCOUNTER
Called patient back and left message regarding the possibility of rescheduling his appointment. I told him to give us a call back and we can see what we can do for him.

## 2024-06-06 NOTE — TELEPHONE ENCOUNTER
Called patient again today and left a message to give us a call back to reschedule his appointment.

## 2024-06-11 ENCOUNTER — LAB ENCOUNTER (OUTPATIENT)
Dept: LAB | Age: 84
End: 2024-06-11
Attending: INTERNAL MEDICINE
Payer: MEDICARE

## 2024-06-11 ENCOUNTER — OFFICE VISIT (OUTPATIENT)
Dept: INTERNAL MEDICINE CLINIC | Facility: CLINIC | Age: 84
End: 2024-06-11
Payer: MEDICARE

## 2024-06-11 VITALS
HEART RATE: 60 BPM | DIASTOLIC BLOOD PRESSURE: 56 MMHG | SYSTOLIC BLOOD PRESSURE: 126 MMHG | HEIGHT: 69 IN | WEIGHT: 204.81 LBS | BODY MASS INDEX: 30.33 KG/M2

## 2024-06-11 DIAGNOSIS — I10 ESSENTIAL HYPERTENSION: ICD-10-CM

## 2024-06-11 DIAGNOSIS — D64.9 ANEMIA, UNSPECIFIED TYPE: ICD-10-CM

## 2024-06-11 DIAGNOSIS — I10 ESSENTIAL HYPERTENSION: Primary | ICD-10-CM

## 2024-06-11 PROBLEM — D50.9 IRON DEFICIENCY ANEMIA: Status: ACTIVE | Noted: 2024-06-01

## 2024-06-11 LAB
ANION GAP SERPL CALC-SCNC: 7 MMOL/L (ref 0–18)
BUN BLD-MCNC: 29 MG/DL (ref 9–23)
BUN/CREAT SERPL: 21.6 (ref 10–20)
CALCIUM BLD-MCNC: 9.9 MG/DL (ref 8.7–10.4)
CHLORIDE SERPL-SCNC: 106 MMOL/L (ref 98–112)
CO2 SERPL-SCNC: 29 MMOL/L (ref 21–32)
CREAT BLD-MCNC: 1.34 MG/DL
DEPRECATED HBV CORE AB SER IA-ACNC: 16.6 NG/ML
DEPRECATED RDW RBC AUTO: 51.1 FL (ref 35.1–46.3)
EGFRCR SERPLBLD CKD-EPI 2021: 53 ML/MIN/1.73M2 (ref 60–?)
ERYTHROCYTE [DISTWIDTH] IN BLOOD BY AUTOMATED COUNT: 16.6 % (ref 11–15)
FASTING STATUS PATIENT QL REPORTED: YES
FOLATE SERPL-MCNC: 10.7 NG/ML (ref 5.4–?)
GLUCOSE BLD-MCNC: 96 MG/DL (ref 70–99)
HAPTOGLOB SERPL-MCNC: 102 MG/DL (ref 30–200)
HCT VFR BLD AUTO: 32.1 %
HGB BLD-MCNC: 10.2 G/DL
HGB RETIC QN AUTO: 24.8 PG (ref 28.2–36.6)
IMM RETICS NFR: 0.26 RATIO (ref 0.1–0.3)
IRON SATN MFR SERPL: 6 %
IRON SERPL-MCNC: 27 UG/DL
LDH SERPL L TO P-CCNC: 224 U/L
MAGNESIUM SERPL-MCNC: 2 MG/DL (ref 1.6–2.6)
MCH RBC QN AUTO: 27.3 PG (ref 26–34)
MCHC RBC AUTO-ENTMCNC: 31.8 G/DL (ref 31–37)
MCV RBC AUTO: 85.8 FL
OSMOLALITY SERPL CALC.SUM OF ELEC: 300 MOSM/KG (ref 275–295)
PLATELET # BLD AUTO: 248 10(3)UL (ref 150–450)
POTASSIUM SERPL-SCNC: 4.7 MMOL/L (ref 3.5–5.1)
RBC # BLD AUTO: 3.74 X10(6)UL
RETICS # AUTO: 56.1 X10(3) UL (ref 22.5–147.5)
RETICS/RBC NFR AUTO: 1.5 %
SODIUM SERPL-SCNC: 142 MMOL/L (ref 136–145)
TIBC SERPL-MCNC: 481 UG/DL (ref 250–425)
TRANSFERRIN SERPL-MCNC: 323 MG/DL (ref 215–365)
VIT B12 SERPL-MCNC: 249 PG/ML (ref 211–911)
WBC # BLD AUTO: 4.1 X10(3) UL (ref 4–11)

## 2024-06-11 PROCEDURE — 83735 ASSAY OF MAGNESIUM: CPT

## 2024-06-11 PROCEDURE — 83615 LACTATE (LD) (LDH) ENZYME: CPT

## 2024-06-11 PROCEDURE — 80048 BASIC METABOLIC PNL TOTAL CA: CPT

## 2024-06-11 PROCEDURE — 82746 ASSAY OF FOLIC ACID SERUM: CPT

## 2024-06-11 PROCEDURE — 85045 AUTOMATED RETICULOCYTE COUNT: CPT

## 2024-06-11 PROCEDURE — 84466 ASSAY OF TRANSFERRIN: CPT

## 2024-06-11 PROCEDURE — 36415 COLL VENOUS BLD VENIPUNCTURE: CPT

## 2024-06-11 PROCEDURE — 82607 VITAMIN B-12: CPT

## 2024-06-11 PROCEDURE — G2211 COMPLEX E/M VISIT ADD ON: HCPCS | Performed by: INTERNAL MEDICINE

## 2024-06-11 PROCEDURE — 82728 ASSAY OF FERRITIN: CPT

## 2024-06-11 PROCEDURE — 83010 ASSAY OF HAPTOGLOBIN QUANT: CPT

## 2024-06-11 PROCEDURE — 99214 OFFICE O/P EST MOD 30 MIN: CPT | Performed by: INTERNAL MEDICINE

## 2024-06-11 PROCEDURE — 85027 COMPLETE CBC AUTOMATED: CPT

## 2024-06-11 PROCEDURE — 83540 ASSAY OF IRON: CPT

## 2024-06-11 NOTE — PROGRESS NOTES
Alberto Bray is a 84 year old male.   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    He has been feeling well.  Recent BP checks consistently 120s/60s.  Lower leg swelling has been stable.  No headaches.  No lightheadedness or dizziness.  No palpitations or chest pain.  No shortness of breath.    He sees Dr. Boyer of Cardiology regularly.  This past April, CBC revealed anemia with hemoglobin 10.3 hematocrit 32.7.  He does notice frequent bruising, likely from Eliquis.  He has not noticed other clinical signs of bleeding.  Appetite normal.  No heartburn dysphagia nausea vomiting or abdominal pain.  Stools normal without diarrhea constipation or rectal bleeding.    Medications reviewed, as listed below.  Current Outpatient Medications   Medication Sig Dispense Refill    amLODIPine 5 MG Oral Tab Take 1 tablet (5 mg total) by mouth daily. 90 tablet 1    potassium chloride 20 MEQ Oral Tab CR Take 1 tablet (20 mEq total) by mouth daily.      furosemide 40 MG Oral Tab Take 1 tablet (40 mg total) by mouth daily. 90 tablet 3    latanoprost 0.005 % Ophthalmic Solution Place 1 drop into both eyes nightly. Instill 1 drop by ophthalmic route every night into both eyes 7.5 mL 3    atorvastatin 20 MG Oral Tab Take 1 tablet (20 mg total) by mouth daily. 90 tablet 3    Sotalol HCl, AF, 160 MG Oral Tab Take 160 mg by mouth daily.      ELIQUIS 5 MG Oral Tab 1 tablet (5 mg total) 2 (two) times daily. Resume on 11/5 in morning.  5    finasteride 5 MG Oral Tab Take 1 tablet (5 mg total) by mouth daily.      Vitamin D3 (VITAMIN D3) 1000 UNITS Oral Tab Take 1 tablet (1,000 Units total) by mouth daily.      tamsulosin (FLOMAX) cap Take 1 capsule (0.4 mg total) by mouth daily.       Allergies   Allergen Reactions    Azithromycin Dihydrate [Azithromycin] SWELLING    Meperidine Hcl  [Demerol] SWELLING     Penicillin G RASH      Past Medical History:    Aortic atherosclerosis ([facility])    CXR 5-14    ASHD (arteriosclerotic heart disease)    Cardiac CT angiography 9-15 with 40-60% mid LAD stenosis; Lexiscan GXT 8-22 with small fixed apical inferolateral perfusion defect likely artifactual, with EF 30%; CTA coronary arteries 12-23 with less than 20% LMT stenosis, diffuse calcified plaque RCA and LAD, with normal FFR    BPH (benign prostatic hyperplasia)    Chronic kidney disease, stage 3 ([facility])    Essential hypertension    Glaucoma    12/20/17 Started Latanoprost OS qhs due to OCT thinning OS 12/13/18 Start Latanprost qhs OD due to thinning of RNFL OD     Hypercholesterolemia    Left ventricular hypertrophy    Echo 7-18 with mild LVH, normal LV function, EF 55-60%, mild MR, severe LAE, mild TR with peak pulmonary artery pressure 38 mmHg    Paroxysmal atrial fibrillation ([facility])    Status post ablation 11-22    Pulmonary hypertension ([facility])    Echo 8-22 with normal LV function, EF 53%, mild LAE, mild MR, moderate pulmonary hypertension with PAP 52 mmHg    Second degree AV block    Status post pacemaker placement 5-14    Thrombocytopenia ([facility])     Past Surgical History:   Procedure Laterality Date    Appendectomy  1968    Cardiac pacemaker placement  05/2014    Cataract extraction w/ intraocular lens implant Right 12/13/2023    Dr. Gar @ [facility]    Ep a-flutter ablation  11/04/2022         Ep cardioversion 1x  08/16/2021         Ep cardioversion 1x  09/22/2021         Ep generator change  11/04/2022         Ep pulmonary vein/a-fib ablation  11/04/2022         Tonsillectomy  Childhood      Social History:  Social History     Socioeconomic History    Marital status:    Occupational History    Occupation: Retired dentist   Tobacco Use    Smoking status: Former     Current packs/day: 0.00     Average packs/day: 0.1 packs/day for 28.0 years (2.8 ttl pk-yrs)     Types: Cigarettes     Start date: 1/1/1970     Quit date: 1/1/1998      Years since quittin.4    Smokeless tobacco: Never   Vaping Use    Vaping status: Never Used   Substance and Sexual Activity    Alcohol use: Yes     Alcohol/week: 0.0 standard drinks of alcohol     Comment: 2 glasses of wine most days    Drug use: No   Other Topics Concern    Caffeine Concern Yes     Comment: 6cups         EXAM:   GENERAL: Pleasant male appearing well in no distress  /56   Pulse 60   Ht 5' 9\" (1.753 m)   Wt 204 lb 12.8 oz (92.9 kg)   BMI 30.24 kg/m²   LUNGS: Resonant to percussion and clear to auscultation  CARDIAC: Rhythm regular S1 S2 normal without murmur, with 1-2+ pitting edema distal lower extremities bilaterally  ABDOMEN: Bowel sounds normal soft nontender without mass or hepatosplenomegaly      ASSESSMENT AND PLAN:   1. Essential hypertension  Well-controlled  Continue current medication  Check BMP and magnesium level today.  Order sent  Medicare physical in August  - Basic Metabolic Panel (8); Future  - Magnesium; Future    2. Anemia, unspecified type  New onset  Check CBC iron TIBC ferritin B12 folate reticulocyte count LDH and haptoglobin today.  Orders sent  - CBC, Platelet; No Differential; Future  - Iron And Tibc; Future  - Ferritin; Future  - Vitamin B12; Future  - Folic Acid Serum (Folate); Future  - Reticulocyte Count; Future  - LDH; Future  - Haptoglobin; Future      The patient indicates understanding of these issues and agrees to the plan.  The patient is asked to return in August.    Honorio Flores MD  2024  11:15 AM

## 2024-06-11 NOTE — PATIENT INSTRUCTIONS
Await results of today's blood tests  Continue current medications  Please schedule a Medicare physical in August

## 2024-06-24 RX ORDER — LATANOPROST 50 UG/ML
1 SOLUTION/ DROPS OPHTHALMIC NIGHTLY
Qty: 3 EACH | Refills: 3 | Status: SHIPPED | OUTPATIENT
Start: 2024-06-24

## 2024-07-10 ENCOUNTER — OFFICE VISIT (OUTPATIENT)
Dept: OPHTHALMOLOGY | Facility: CLINIC | Age: 84
End: 2024-07-10
Payer: MEDICARE

## 2024-07-10 DIAGNOSIS — H40.1131 PRIMARY OPEN ANGLE GLAUCOMA (POAG) OF BOTH EYES, MILD STAGE: ICD-10-CM

## 2024-07-10 DIAGNOSIS — Z96.1 PSEUDOPHAKIA, RIGHT EYE: Primary | ICD-10-CM

## 2024-07-10 DIAGNOSIS — H25.12 AGE-RELATED NUCLEAR CATARACT OF LEFT EYE: ICD-10-CM

## 2024-07-10 DIAGNOSIS — H43.393 VITREOUS FLOATERS OF BOTH EYES: ICD-10-CM

## 2024-07-10 PROCEDURE — 92083 EXTENDED VISUAL FIELD XM: CPT | Performed by: OPHTHALMOLOGY

## 2024-07-10 PROCEDURE — 92014 COMPRE OPH EXAM EST PT 1/>: CPT | Performed by: OPHTHALMOLOGY

## 2024-07-10 PROCEDURE — 92133 CPTRZD OPH DX IMG PST SGM ON: CPT | Performed by: OPHTHALMOLOGY

## 2024-07-10 PROCEDURE — 92250 FUNDUS PHOTOGRAPHY W/I&R: CPT | Performed by: OPHTHALMOLOGY

## 2024-07-10 RX ORDER — LATANOPROST 50 UG/ML
1 SOLUTION/ DROPS OPHTHALMIC NIGHTLY
Qty: 3 EACH | Refills: 3 | Status: CANCELLED | OUTPATIENT
Start: 2024-07-10

## 2024-07-10 NOTE — PATIENT INSTRUCTIONS
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    Fundus photos taken today    Intraocular pressure stable, tolerating medications.  Will continue same regimen of Latanoprost in both eyes at bedtime.     Will have patient back in 4 months for a pressure check    Pseudophakia, right eye  No treatment    Age-related nuclear cataract of left eye   Discussed diagnosis of cataracts in detail with patient.  Cataract surgery not indicated at this time due to vision level.  Will continue to monitor yearly.  Patient will call sooner than 1 year recall if they notice a change in vision.      Vitreous floaters of both eyes   There is no evidence of retinal pathology.  All signs and symptoms of retinal detachment/tears explained in detail.    Patient instructed to call the office if they experience increase in floaters, increase in flashes of light, loss of vision or curtain or veil effect.

## 2024-07-10 NOTE — PROGRESS NOTES
Alberto Bray is a 84 year old male.    HPI:     HPI    EP/ here for a complete eye exam with VF + OCT and photos.   States the eyes and vision have been the same no changes noted.   Using Latanoprost eye drops once at bed time in both the eyes.       Last edited by Maria Alejandra Purdy OT on 7/10/2024  1:12 PM.        Patient History:  Past Medical History:    Aortic atherosclerosis (HCC)    CXR 5-14    ASHD (arteriosclerotic heart disease)    Cardiac CT angiography 9-15 with 40-60% mid LAD stenosis; Lexiscan GXT 8-22 with small fixed apical inferolateral perfusion defect likely artifactual, with EF 30%; CTA coronary arteries 12-23 with less than 20% LMT stenosis, diffuse calcified plaque RCA and LAD, with normal FFR    BPH (benign prostatic hyperplasia)    Chronic kidney disease, stage 3 (HCC)    Essential hypertension    Glaucoma    12/20/17 Started Latanoprost OS qhs due to OCT thinning OS 12/13/18 Start Latanprost qhs OD due to thinning of RNFL OD     Hypercholesterolemia    Iron deficiency anemia    Left ventricular hypertrophy    Echo 7-18 with mild LVH, normal LV function, EF 55-60%, mild MR, severe LAE, mild TR with peak pulmonary artery pressure 38 mmHg    Paroxysmal atrial fibrillation (HCC)    Status post ablation 11-22    Pulmonary hypertension (HCC)    Echo 8-22 with normal LV function, EF 53%, mild LAE, mild MR, moderate pulmonary hypertension with PAP 52 mmHg    Second degree AV block    Status post pacemaker placement 5-14    Thrombocytopenia (HCC)       Surgical History: Alberto Bray has a past surgical history that includes tonsillectomy (Childhood); appendectomy (1968); Cardiac pacemaker placement (05/2014); ep cardioversion 1x (08/16/2021) ( ); ep cardioversion 1x (09/22/2021) ( ); ep pulmonary vein/a-fib ablation (11/04/2022) ( ); ep a-flutter ablation (11/04/2022) ( ); ep generator change (11/04/2022) ( ); and Cataract extraction w/ intraocular lens implant (Right, 12/13/2023) (  Cong @ [facility]).    Family History   Problem Relation Age of Onset    Diabetes Father     Diabetes Paternal Aunt     Hypertension Mother     Other (CVA) Mother         Age 48    Colon Cancer Paternal Grandmother     Other (Lung Cancer) Paternal Grandfather     Macular degeneration Cousin     Glaucoma Neg        Social History:   Social History     Socioeconomic History    Marital status:    Occupational History    Occupation: Retired dentist   Tobacco Use    Smoking status: Former     Current packs/day: 0.00     Average packs/day: 0.1 packs/day for 28.0 years (2.8 ttl pk-yrs)     Types: Cigarettes     Start date: 1970     Quit date: 1998     Years since quittin.5    Smokeless tobacco: Never   Vaping Use    Vaping status: Never Used   Substance and Sexual Activity    Alcohol use: Yes     Alcohol/week: 0.0 standard drinks of alcohol     Comment: 2 glasses of wine most days    Drug use: No   Other Topics Concern    Caffeine Concern Yes     Comment: 6cups        Medications:  Current Outpatient Medications   Medication Sig Dispense Refill    LATANOPROST 0.005 % Ophthalmic Solution Place 1 drop into both eyes nightly 3 each 3    amLODIPine 5 MG Oral Tab Take 1 tablet (5 mg total) by mouth daily. 90 tablet 1    potassium chloride 20 MEQ Oral Tab CR Take 1 tablet (20 mEq total) by mouth daily.      furosemide 40 MG Oral Tab Take 1 tablet (40 mg total) by mouth daily. 90 tablet 3    atorvastatin 20 MG Oral Tab Take 1 tablet (20 mg total) by mouth daily. 90 tablet 3    Sotalol HCl, AF, 160 MG Oral Tab Take 160 mg by mouth daily.      ELIQUIS 5 MG Oral Tab 1 tablet (5 mg total) 2 (two) times daily. Resume on 11/ in morning.  5    finasteride 5 MG Oral Tab Take 1 tablet (5 mg total) by mouth daily.      Vitamin D3 (VITAMIN D3) 1000 UNITS Oral Tab Take 1 tablet (1,000 Units total) by mouth daily.      tamsulosin (FLOMAX) cap Take 1 capsule (0.4 mg total) by mouth daily.         Allergies:  Allergies   Allergen  Reactions    Azithromycin Dihydrate [Azithromycin] SWELLING    Meperidine Hcl  [Demerol] SWELLING    Penicillin G RASH       ROS:       PHYSICAL EXAM:     Base Eye Exam       Visual Acuity (Snellen - Linear)         Right Left    Dist sc 20/20 -2     Dist cc  20/25 +2    Near sc 20/50     Near cc  20/25      Correction: Glasses              Tonometry (Applanation, 1:21 PM)         Right Left    Pressure 12 14              Pachymetry (02/10/2008)         Right Left    Thickness 549/- 1/2 547/0              Pupils         Pupils    Right PERRL    Left PERRL              Visual Fields    Defer to HVF              Extraocular Movement         Right Left     Full Full              Neuro/Psych       Oriented x3: Yes              Dilation       Both eyes: 1.0% Mydriacyl and 2.5% Fahad Synephrine @ 1:21 PM                  Slit Lamp and Fundus Exam       Slit Lamp Exam         Right Left    Lids/Lashes Dermatochalasis, Meibomian gland dysfunction Dermatochalasis, Meibomian gland dysfunction    Conjunctiva/Sclera Trace Injection Normal    Cornea Clear- no K spindles Clear- no K spindles    Anterior Chamber Deep and rare cell Deep and quiet    Iris No transillumination defects No transillumination defects    Lens PC IOL with trace opacity 2+ Nuclear sclerosis, Trace Cortical cataract    Vitreous Cooley ring Vitreous floaters              Fundus Exam         Right Left    Disc Sloping margin, Temporal crescent Sloping margin, Temporal crescent    C/D Ratio 0.7 0.6    Macula Normal Normal    Vessels Normal Normal    Periphery Normal Normal                  Refraction       Wearing Rx         Sphere Cylinder Add    Right [place] Sphere     Left -2.25 Sphere +2.75      Age: 5yrs    Type: Trifocals              Manifest Refraction    Declines refraction, states want to wait to get new glasses until left eye cataract Sx.                     ASSESSMENT/PLAN:     Diagnoses and Plan:     Primary open angle glaucoma (POAG) of both eyes,  mild stage  Visual field and OCT completed in office today with stable results that were discussed with patient in office today.    Fundus photos taken today    Intraocular pressure stable, tolerating medications.  Will continue same regimen of Latanoprost in both eyes at bedtime.     Will have patient back in 4 months for a pressure check    Pseudophakia, right eye  No treatment    Age-related nuclear cataract of left eye   Discussed diagnosis of cataracts in detail with patient.  Cataract surgery not indicated at this time due to vision level.  Will continue to monitor yearly.  Patient will call sooner than 1 year recall if they notice a change in vision.      Vitreous floaters of both eyes   There is no evidence of retinal pathology.  All signs and symptoms of retinal detachment/tears explained in detail.    Patient instructed to call the office if they experience increase in floaters, increase in flashes of light, loss of vision or curtain or veil effect.       No orders of the defined types were placed in this encounter.      Meds This Visit:  Requested Prescriptions      No prescriptions requested or ordered in this encounter        Follow up instructions:  Return in about 4 months (around 11/10/2024) for IOP check.    7/10/2024  Scribed by: Giuseppe Melo MD

## 2024-07-10 NOTE — ASSESSMENT & PLAN NOTE
Discussed diagnosis of cataracts in detail with patient.  Cataract surgery not indicated at this time due to vision level.  Will continue to monitor yearly.  Patient will call sooner than 1 year recall if they notice a change in vision.

## 2024-07-10 NOTE — ASSESSMENT & PLAN NOTE
Visual field and OCT completed in office today with stable results that were discussed with patient in office today.    Fundus photos taken today    Intraocular pressure stable, tolerating medications.  Will continue same regimen of Latanoprost in both eyes at bedtime.     Will have patient back in 4 months for a pressure check

## 2024-07-12 RX ORDER — AMLODIPINE BESYLATE 5 MG/1
5 TABLET ORAL DAILY
Qty: 90 TABLET | Refills: 3 | Status: SHIPPED | OUTPATIENT
Start: 2024-07-12

## 2024-07-12 NOTE — TELEPHONE ENCOUNTER
Refill Per Protocol     Requested Prescriptions   Pending Prescriptions Disp Refills    AMLODIPINE 5 MG Oral Tab [Pharmacy Med Name: Amlodipine Besylate 5 Mg Tab Unic] 90 tablet 0     Sig: Take 1 tablet (5 mg total) by mouth daily.       Hypertension Medications Protocol Passed - 7/9/2024  2:02 PM        Passed - CMP or BMP in past 12 months        Passed - Last BP reading less than 140/90     BP Readings from Last 1 Encounters:   06/11/24 126/56               Passed - In person appointment or virtual visit in the past 12 mos or appointment in next 3 mos     Recent Out[facility] Visits              [facility]    [facility]    1 month [facility]    [facility]    5 months ago Primary open angle glaucoma (POAG) of both eyes, mild stage    [facility]    [facility]    5 months ago ASHD (arteriosclerotic heart disease)    [facility]    [facility]    8 months ago Localized edema    [facility]    [facility]                      Passed - EGFRCR or GFRNAA > 50     GFR Evaluation  EGFRCR: 53 , resulted on 6/11/2024                   Recent Outpatient Visits              2 days ago [facility], right [facility]    [facility]    1 month ago Essential hypertension    [facility]    [facility]    5 months ago Primary open angle glaucoma (POAG) of both eyes, mild stage    [facility]    [facility]    5 months ago ASHD (arteriosclerotic heart  disease)    [facility]    [facility]    8 months ago Localized edema    [facility]    [facility] Visit

## 2024-07-19 RX ORDER — POTASSIUM CHLORIDE 20 MEQ/1
20 TABLET, EXTENDED RELEASE ORAL DAILY
Qty: 90 TABLET | Refills: 3 | Status: SHIPPED | OUTPATIENT
Start: 2024-07-19

## 2024-07-19 NOTE — TELEPHONE ENCOUNTER
Protocol Failed/ No Protocol    Requested Prescriptions   Pending Prescriptions Disp Refills    potassium chloride 20 MEQ Oral Tab CR [Pharmacy Med Name: Potassium Chloride Er 20 Meq Tab Amne] 90 tablet 3     Sig: Take one tablet by mouth daily.       There is no refill protocol information for this order            Recent Outpatient Visits              [facility] week [facility]    [facility]    6 months ago Primary open angle glaucoma (POAG) of both eyes, mild stage    [facility]    [facility]    6 months ago ASHD (arteriosclerotic heart disease)    [facility] English Michael, [facility]    Office Visit    8 months ago [facility] edema    [facility]

## 2024-08-20 RX ORDER — ATORVASTATIN CALCIUM 20 MG/1
20 TABLET, FILM COATED ORAL DAILY
Qty: 90 TABLET | Refills: 1 | Status: SHIPPED | OUTPATIENT
Start: 2024-08-20

## 2024-08-20 NOTE — TELEPHONE ENCOUNTER
Please review. Protocol Failed or has no Protocol. Abnormal lab results. Please advise on this refill request.    Please reply to pool: EM RN TRIAGE      Requested Prescriptions   Pending Prescriptions Disp Refills    ATORVASTATIN 20 MG Oral Tab [Pharmacy Med Name: Atorvastatin Calcium 20 Mg Tab Nort] 90 tablet 0     Sig: TAKE ONE TABLET BY MOUTH ONE TIME DAILY       Cholesterol Medication Protocol Failed - 8/16/2024  2:19 PM        Failed - ALT < 80     Lab Results   Component Value Date    ALT 22 05/22/2023             Failed - ALT resulted within past year        Failed - Lipid panel within past 12 months     Lab Results   Component Value Date    CHOLEST 171 05/22/2023    TRIG 58 05/22/2023     (H) 05/22/2023    LDL 51 05/22/2023    VLDL 8 05/22/2023    NONHDLC 63 05/22/2023             Passed - In person appointment or virtual visit in the past 12 mos or appointment in next 3 mos     Recent Outpatient Visits              1 month [facility]    2 months [facility]    7 months ago Primary open angle glaucoma (POAG) of both eyes, mild stage    [facility]    [facility]    7 months ago ASHD (arteriosclerotic heart disease)    [facility]    [facility]    9 months ago [facility]ized edema    [facility]    [facility]

## 2024-08-22 NOTE — TELEPHONE ENCOUNTER
Spoke to patient; he is currently undergoing treatment for another problem.  As soon as he is done, he will schedule a physical with pcp

## 2024-08-26 ENCOUNTER — TELEPHONE (OUTPATIENT)
Dept: INTERNAL MEDICINE CLINIC | Facility: CLINIC | Age: 84
End: 2024-08-26

## 2024-08-26 RX ORDER — SOTALOL HYDROCHLORIDE 160 MG/1
160 TABLET ORAL DAILY
COMMUNITY
Start: 2024-08-26

## 2024-08-26 RX ORDER — FERROUS SULFATE 325(65) MG
325 TABLET, DELAYED RELEASE (ENTERIC COATED) ORAL
COMMUNITY

## 2024-08-26 RX ORDER — CHOLECALCIFEROL (VITAMIN D3) 25 MCG
1000 TABLET ORAL DAILY
COMMUNITY

## 2024-08-26 NOTE — TELEPHONE ENCOUNTER
COMPREHENSIVE MEDICATION REVIEW         Alberto Bray MRN [id number]    1940 PCP Honorio Flores MD     Comments: Medication history completed by [facility] Pharmacist over the phone on 24. Patient has upcoming AWV with PCP on 24.     After thorough medication review, 3 discrepancies have been identified and corrected on patient's medication list. See updated list below:     Outpatient Encounter Medications as of 2024   Medication Sig    Sotalol HCl 160 MG Oral Tab Take 1 tablet (160 mg total) by mouth daily.    ferrous sulfate 325 (65 FE) MG Oral Tab EC Take 1 tablet (325 mg total) by mouth daily with breakfast.    cholecalciferol 25 MCG (1000 UT) Oral Tab Take 1 tablet (1,000 Units total) by mouth daily.    atorvastatin 20 MG Oral Tab Take 1 tablet (20 mg total) by mouth daily.    potassium chloride 20 MEQ Oral Tab CR Take 1 tablet (20 mEq total) by mouth daily.    amLODIPine 5 MG Oral Tab Take 1 tablet (5 mg total) by mouth daily.    LATANOPROST 0.005 % Ophthalmic Solution Place 1 drop into both eyes nightly    furosemide 40 MG Oral Tab Take 1 tablet (40 mg total) by mouth daily.    ELIQUIS 5 MG Oral Tab Take 1 tablet (5 mg total) by mouth 2 (two) times daily.    finasteride 5 MG Oral Tab Take 1 tablet (5 mg total) by mouth daily.    tamsulosin (FLOMAX) cap Take 1 capsule (0.4 mg total) by mouth daily.     Medication Assessment:   Reviewed all medications in detail with patient including dose, indication, timing of administration, monitoring parameters, and potential side effects of medications.     Patient reports taking amlodipine 5 mg daily, Eliquis 5 mg twice daily, furosemide 40 mg daily, potassium chloride 20 mEq daily and sotalol 160 mg daily as prescribed. He does follow closely with a cardiologist. Patient does monitor his blood pressure at home. Did instruct patient to bring recent blood pressure readings in to PCP for review.     Did provide education and stressed  the importance of taking medication just like prescribed to get the most benefit. Patient denies forgetting or missing medication doses and denies any questions or concerns at this time.     Thank you,    Deepika Rodriguez, PharmD, 8/26/2024, 9:57 AM

## 2024-08-28 ENCOUNTER — LAB ENCOUNTER (OUTPATIENT)
Dept: LAB | Age: 84
End: 2024-08-28
Attending: INTERNAL MEDICINE
Payer: MEDICARE

## 2024-08-28 ENCOUNTER — OFFICE VISIT (OUTPATIENT)
Dept: INTERNAL MEDICINE CLINIC | Facility: CLINIC | Age: 84
End: 2024-08-28
Payer: MEDICARE

## 2024-08-28 VITALS
HEIGHT: 69 IN | WEIGHT: 205.63 LBS | BODY MASS INDEX: 30.46 KG/M2 | HEART RATE: 73 BPM | SYSTOLIC BLOOD PRESSURE: 124 MMHG | DIASTOLIC BLOOD PRESSURE: 62 MMHG

## 2024-08-28 DIAGNOSIS — Z00.00 ANNUAL PHYSICAL EXAM: ICD-10-CM

## 2024-08-28 DIAGNOSIS — N40.0 BENIGN PROSTATIC HYPERPLASIA WITHOUT LOWER URINARY TRACT SYMPTOMS: ICD-10-CM

## 2024-08-28 DIAGNOSIS — I48.0 PAROXYSMAL ATRIAL FIBRILLATION (HCC): ICD-10-CM

## 2024-08-28 DIAGNOSIS — D69.6 THROMBOCYTOPENIA (HCC): ICD-10-CM

## 2024-08-28 DIAGNOSIS — D50.9 IRON DEFICIENCY ANEMIA, UNSPECIFIED IRON DEFICIENCY ANEMIA TYPE: ICD-10-CM

## 2024-08-28 DIAGNOSIS — Z00.00 ANNUAL PHYSICAL EXAM: Primary | ICD-10-CM

## 2024-08-28 DIAGNOSIS — N18.30 STAGE 3 CHRONIC KIDNEY DISEASE, UNSPECIFIED WHETHER STAGE 3A OR 3B CKD (HCC): ICD-10-CM

## 2024-08-28 DIAGNOSIS — I70.0 AORTIC ATHEROSCLEROSIS (HCC): ICD-10-CM

## 2024-08-28 DIAGNOSIS — I44.1 SECOND DEGREE AV BLOCK: ICD-10-CM

## 2024-08-28 DIAGNOSIS — E78.00 HYPERCHOLESTEROLEMIA: ICD-10-CM

## 2024-08-28 DIAGNOSIS — I10 ESSENTIAL HYPERTENSION: ICD-10-CM

## 2024-08-28 DIAGNOSIS — Z00.00 ENCOUNTER FOR ANNUAL HEALTH EXAMINATION: ICD-10-CM

## 2024-08-28 DIAGNOSIS — I27.20 PULMONARY HYPERTENSION (HCC): ICD-10-CM

## 2024-08-28 DIAGNOSIS — I25.10 ASHD (ARTERIOSCLEROTIC HEART DISEASE): ICD-10-CM

## 2024-08-28 LAB
ALBUMIN SERPL-MCNC: 4.3 G/DL (ref 3.2–4.8)
ALBUMIN/GLOB SERPL: 1.5 {RATIO} (ref 1–2)
ALP LIVER SERPL-CCNC: 87 U/L
ALT SERPL-CCNC: 15 U/L
ANION GAP SERPL CALC-SCNC: 6 MMOL/L (ref 0–18)
AST SERPL-CCNC: 23 U/L (ref ?–34)
BILIRUB SERPL-MCNC: 1.1 MG/DL (ref 0.2–1.1)
BUN BLD-MCNC: 23 MG/DL (ref 9–23)
BUN/CREAT SERPL: 19.2 (ref 10–20)
CALCIUM BLD-MCNC: 10.1 MG/DL (ref 8.7–10.4)
CHLORIDE SERPL-SCNC: 105 MMOL/L (ref 98–112)
CHOLEST SERPL-MCNC: 191 MG/DL (ref ?–200)
CO2 SERPL-SCNC: 30 MMOL/L (ref 21–32)
CREAT BLD-MCNC: 1.2 MG/DL
DEPRECATED HBV CORE AB SER IA-ACNC: 71.7 NG/ML
DEPRECATED RDW RBC AUTO: 60.9 FL (ref 35.1–46.3)
EGFRCR SERPLBLD CKD-EPI 2021: 60 ML/MIN/1.73M2 (ref 60–?)
ERYTHROCYTE [DISTWIDTH] IN BLOOD BY AUTOMATED COUNT: 16.7 % (ref 11–15)
FASTING PATIENT LIPID ANSWER: YES
FASTING STATUS PATIENT QL REPORTED: YES
GLOBULIN PLAS-MCNC: 2.8 G/DL (ref 2–3.5)
GLUCOSE BLD-MCNC: 96 MG/DL (ref 70–99)
HCT VFR BLD AUTO: 39.1 %
HDLC SERPL-MCNC: 103 MG/DL (ref 40–59)
HGB BLD-MCNC: 13.2 G/DL
LDLC SERPL CALC-MCNC: 76 MG/DL (ref ?–100)
MCH RBC QN AUTO: 33 PG (ref 26–34)
MCHC RBC AUTO-ENTMCNC: 33.8 G/DL (ref 31–37)
MCV RBC AUTO: 97.8 FL
NONHDLC SERPL-MCNC: 88 MG/DL (ref ?–130)
OSMOLALITY SERPL CALC.SUM OF ELEC: 296 MOSM/KG (ref 275–295)
PLATELET # BLD AUTO: 214 10(3)UL (ref 150–450)
POTASSIUM SERPL-SCNC: 4.7 MMOL/L (ref 3.5–5.1)
PROT SERPL-MCNC: 7.1 G/DL (ref 5.7–8.2)
RBC # BLD AUTO: 4 X10(6)UL
SODIUM SERPL-SCNC: 141 MMOL/L (ref 136–145)
TRIGL SERPL-MCNC: 62 MG/DL (ref 30–149)
TSI SER-ACNC: 1.66 MIU/ML (ref 0.55–4.78)
VLDLC SERPL CALC-MCNC: 10 MG/DL (ref 0–30)
WBC # BLD AUTO: 4.2 X10(3) UL (ref 4–11)

## 2024-08-28 PROCEDURE — 80053 COMPREHEN METABOLIC PANEL: CPT

## 2024-08-28 PROCEDURE — 36415 COLL VENOUS BLD VENIPUNCTURE: CPT

## 2024-08-28 PROCEDURE — 85027 COMPLETE CBC AUTOMATED: CPT

## 2024-08-28 PROCEDURE — 99213 OFFICE O/P EST LOW 20 MIN: CPT | Performed by: INTERNAL MEDICINE

## 2024-08-28 PROCEDURE — 82728 ASSAY OF FERRITIN: CPT

## 2024-08-28 PROCEDURE — 80061 LIPID PANEL: CPT

## 2024-08-28 PROCEDURE — G0439 PPPS, SUBSEQ VISIT: HCPCS | Performed by: INTERNAL MEDICINE

## 2024-08-28 PROCEDURE — 84443 ASSAY THYROID STIM HORMONE: CPT

## 2024-08-28 NOTE — PROGRESS NOTES
Subjective:   Alberto Bray is a 84 year old male who presents this morning for a Medicare Subsequent Annual Wellness visit (Pt already had Initial Annual Wellness) and scheduled follow up of multiple significant but stable problems including ASHD, atrial fibrillation, hypertension, hypercholesterolemia and chronic kidney disease stage III.    His last Medicare physical was August 2023.  At his last visit in June, he was diagnosed with iron deficiency anemia, and now is on iron supplementation.  He has no GI symptoms and no clinical symptoms of blood loss.  He is on Eliquis.  Discussed that typically endoscopic evaluation with EGD and colonoscopy is done when iron deficiency anemia is diagnosed, but he wishes to defer for now.  He feels well.  No particular issues for discussion today.    In terms of his ASHD and atrial fibrillation, he sees Dr. Boyer of Cardiology annually.  In terms of his hypertension, BP checks typically 120s/70s.  Diet healthy.  No regular physical activity and he walks with the use of a cane.  Weight has been stable. He requests that a handicapped parking form be completed today.      History/Other:   Fall Risk Assessment:   He has been screened for Falls and is High Risk. Fall Prevention information provided to patient in After Visit Summary.    Do you feel unsteady when standing or walking?: Yes  Do you worry about falling?: Yes  Have you fallen in the past year?: Yes  How many times have you fallen?: (P) 1  Were you injured?: (P) Yes     Cognitive Assessment:   He had a completely normal cognitive assessment - see flowsheet entries     Functional Ability/Status:   Alberto Bray has some abnormal functions as listed below:  He has difficulties Shopping for Groceries based on screening of functional status. He has Walking problems based on screening of functional status.       Depression Screening (PHQ):  PHQ-2 SCORE: 0  , done 8/27/2024   Last [place] Suicide Screening on  8/28/2024 was No Risk.     Advanced Directives:   He does have a Living Will but we do NOT have it on file in Epic.    He does have a POA but we do NOT have it on file in Epic.    Not discussed      Patient Active Problem List   Diagnosis    Age-related nuclear cataract of left eye    Vitreous floaters of both eyes    ASHD (arteriosclerotic heart disease)    Second degree AV block    Paroxysmal atrial fibrillation (HCC)    Hypercholesterolemia    BPH (benign prostatic hyperplasia)    Aortic atherosclerosis (HCC)    Chronic kidney disease, stage 3 (HCC)    Pulmonary hypertension (HCC)    Left ventricular hypertrophy    Primary open angle glaucoma (POAG) of both eyes, mild stage    Essential hypertension    Thrombocytopenia (HCC)    Iron deficiency anemia    Pseudophakia, right eye     Allergies:  He is allergic to azithromycin dihydrate [azithromycin], penicillin g, and meperidine hcl  [demerol].    Current Medications:  Outpatient Medications Marked as Taking for the 8/28/24 encounter (Office Visit) with Honorio Flores MD   Medication Sig    Sotalol HCl 160 MG Oral Tab Take 1 tablet (160 mg total) by mouth daily.    ferrous sulfate 325 (65 FE) MG Oral Tab EC Take 1 tablet (325 mg total) by mouth daily with breakfast.    cholecalciferol 25 MCG (1000 UT) Oral Tab Take 1 tablet (1,000 Units total) by mouth daily.    atorvastatin 20 MG Oral Tab Take 1 tablet (20 mg total) by mouth daily.    potassium chloride 20 MEQ Oral Tab CR Take 1 tablet (20 mEq total) by mouth daily.    amLODIPine 5 MG Oral Tab Take 1 tablet (5 mg total) by mouth daily.    LATANOPROST 0.005 % Ophthalmic Solution Place 1 drop into both eyes nightly    furosemide 40 MG Oral Tab Take 1 tablet (40 mg total) by mouth daily.    ELIQUIS 5 MG Oral Tab Take 1 tablet (5 mg total) by mouth 2 (two) times daily.    finasteride 5 MG Oral Tab Take 1 tablet (5 mg total) by mouth daily.    tamsulosin (FLOMAX) cap Take 1 capsule (0.4 mg total) by mouth daily.        Medical History:  He  has a past medical history of Aortic atherosclerosis (HCC), ASHD (arteriosclerotic heart disease), BPH (benign prostatic hyperplasia), Chronic kidney disease, stage 3 (HCC), Essential hypertension, Glaucoma (2017), Hypercholesterolemia, Iron deficiency anemia (2024), Left ventricular hypertrophy, Paroxysmal atrial fibrillation (HCC), Pulmonary hypertension (HCC), Second degree AV block, and Thrombocytopenia (HCC).  Surgical History:  He  has a past surgical history that includes tonsillectomy (Childhood); appendectomy (1968); Cardiac pacemaker placement (2014); ep cardioversion 1x (2021); ep cardioversion 1x (2021); ep pulmonary vein/a-fib ablation (2022); ep a-flutter ablation (2022); ep generator change (2022); and Cataract extraction w/ intraocular lens implant (Right, 2023).   Family History:  His family history includes CVA in his mother; Colon Cancer in his paternal grandmother; Diabetes in his father and paternal aunt; Hypertension in his mother; Lung Cancer in his paternal grandfather; Macular degeneration in his cousin.  Social History:  He  reports that he quit smoking about 26 years ago. His smoking use included cigarettes. He started smoking about 54 years ago. He has a 2.8 pack-year smoking history. He has never used smokeless tobacco. He reports current alcohol use. He reports that he does not use drugs.    Tobacco:  He smoked tobacco in the past but quit greater than 12 months ago.  Social History     Tobacco Use   Smoking Status Former    Current packs/day: 0.00    Average packs/day: 0.1 packs/day for 28.0 years (2.8 ttl pk-yrs)    Types: Cigarettes    Start date: 1970    Quit date: 1998    Years since quittin.6   Smokeless Tobacco Never        CAGE Alcohol Screen:   CAGE screening score of 0 on 2024, showing low risk of alcohol abuse.      Patient Care Team:  Honorio Flores MD as PCP - General (Internal  Medicine)    Review of Systems    REVIEW OF SYSTEMS:   GENERAL: No fever  LUNGS: No cough wheezing or shortness of breath  CARDIAC: No lightheadedness palpitations or chest pain  GI: No anorexia heartburn dysphagia nausea vomiting abdominal pain diarrhea constipation or rectal bleeding  : Occasional urinary frequency related to furosemide.  No dysuria or hematuria  MUSCULOSKELETAL: Chronic bilateral right greater than left lower leg swelling  NEURO: No headaches     Objective:   Physical Exam    EXAM:   GENERAL: Pleasant male appearing well in no distress  /62   Pulse 73   Ht 5' 9\" (1.753 m)   Wt 205 lb 9.6 oz (93.3 kg)   BMI 30.36 kg/m²   HEENT: Anicteric, conjunctiva pink, oropharynx normal  NECK: Supple without mass or thyromegaly  NODES: No peripheral adenopathy  LUNGS: Resonant to percussion and clear to auscultation without crackles or wheezes  CARDIAC: Rhythm regular S1 S2 normal without murmur, with 1+ pitting edema distal lower extremities bilaterally  ABDOMEN: Bowel sounds normal soft nontender without mass or hepatosplenomegaly  EXTREMITIES: Normal without cyanosis or clubbing  PULSES: 1-2+ bilateral dorsalis pedis and posterior tibial  NEURO: Reflexes 1-2+ bilaterally and symmetric      /62   Pulse 73   Ht 5' 9\" (1.753 m)   Wt 205 lb 9.6 oz (93.3 kg)   BMI 30.36 kg/m²  Estimated body mass index is 30.36 kg/m² as calculated from the following:    Height as of this encounter: 5' 9\" (1.753 m).    Weight as of this encounter: 205 lb 9.6 oz (93.3 kg).    Medicare Hearing Assessment:   Hearing Screening    Time taken: 8/28/2024  9:44 AM  Entry User: Rajani Schreiber LPN  Screening Method: Finger Rub  Finger Rub Result: Pass               Assessment & Plan:   Alberto Bray is a 84 year old male who presents for a Medicare Assessment.     1. Annual physical exam (Primary)  Recommend annual influenza vaccine with updated COVID booster soon, as well as 2 doses of Shingrix at  pharmacy  Check CMP CBC lipid profile TSH with reflex T4 and ferritin today.  Order sent  Continue current medication  Handicapped parking form completed and given to him  Annual Medicare physical  Return visit in 6 months for recheck.  -     Comp Metabolic Panel (14); Future; Expected date: 08/28/2024  -     CBC, Platelet; No Differential; Future; Expected date: 08/28/2024  -     Lipid Panel; Future; Expected date: 08/28/2024  -     TSH W Reflex To Free T4; Future; Expected date: 08/28/2024  -     Ferritin; Future; Expected date: 08/28/2024    2. ASHD (arteriosclerotic heart disease)  Asymptomatic  Continue current medication and follow-up with Cardiology    3. Paroxysmal atrial fibrillation (HCC)  Ongoing Cardiology follow-up.  Continue Eliquis    4. Second degree AV block  Stable status post pacemaker placement    5. Essential hypertension  Well-controlled.  Continue current medication    6. Hypercholesterolemia  Await labs and continue statin  -     Comp Metabolic Panel (14); Future; Expected date: 08/28/2024  -     Lipid Panel; Future; Expected date: 08/28/2024  -     TSH W Reflex To Free T4; Future; Expected date: 08/28/2024    7. Stage 3 chronic kidney disease, unspecified whether stage 3a or 3b CKD (HCC)  Await labs    8. Iron deficiency anemia, unspecified iron deficiency anemia type  Now on iron supplementation  Await labs  Discussion regarding EGD and colonoscopy as above.  He wishes to defer for now  -     CBC, Platelet; No Differential; Future; Expected date: 08/28/2024  -     Ferritin; Future; Expected date: 08/28/2024    9. Benign prostatic hyperplasia without lower urinary tract symptoms  On maximal medical therapy which will continue    10. Thrombocytopenia (HCC)  Last platelet count normal.  Await labs    11. Pulmonary hypertension (HCC)  Asymptomatic.  Anticipate monitoring with serial Echoes  Overview:  Echo 7-18 with peak pulmonary artery pressure 38 mmHg    12. Aortic atherosclerosis  (HCC)  Asymptomatic.  Continue risk factor control including statin    The patient indicates understanding of these issues and agrees to the plan.  Reinforced healthy diet, lifestyle, and exercise.      No follow-ups on file.     Honorio Flores MD, 8/28/2024     Supplementary Documentation:   General Health:  In the past six months, have you lost more than 10 pounds without trying?: 2 - No  Has your appetite been poor?: No  Type of Diet: Balanced  How does the patient maintain a good energy level?: Other  How would you describe your daily physical activity?: None  How would you describe your current health state?: Fair  How do you maintain positive mental well-being?: Visiting Friends;Visiting Family  On a scale of 0 to 10, with 0 being no pain and 10 being severe pain, what is your pain level?: 2 - (Mild)  In the past six months, have you experienced urine leakage?: 0-No  At any time do you feel concerned for the safety/well-being of yourself and/or your children, in your home or elsewhere?: No  Have you had any immunizations at another office such as Influenza, Hepatitis B, Tetanus, or Pneumococcal?: No    Health Maintenance   Topic Date Due    Zoster Vaccines (1 of 2) Never done    COVID-19 Vaccine (5 - 2023-24 season) 09/01/2023    Annual Depression Screening  01/01/2024    Annual Physical  08/30/2024    Influenza Vaccine (1) 10/01/2024    Fall Risk Screening (Annual)  Completed    Pneumococcal Vaccine: 65+ Years  Completed

## 2024-08-28 NOTE — PATIENT INSTRUCTIONS
Your blood pressure today was excellent at 124/62  Await results of blood tests  Please get a flu shot and updated COVID booster soon, and I also would recommend 2 doses of Shingrix at your pharmacy  Continue current medications  Return visit in 6 months

## 2025-02-24 ENCOUNTER — TELEPHONE (OUTPATIENT)
Facility: CLINIC | Age: 85
End: 2025-02-24

## 2025-02-25 RX ORDER — AMMONIUM LACTATE 12 G/100G
LOTION TOPICAL AS NEEDED
COMMUNITY
Start: 2025-02-07

## 2025-02-25 RX ORDER — FUROSEMIDE 40 MG/1
40 TABLET ORAL DAILY
COMMUNITY

## 2025-02-25 NOTE — TELEPHONE ENCOUNTER
COMPREHENSIVE MEDICATION REVIEW         Alberto Bray MRN [id number]    1940 PCP Gian Canela MD     Comments: Medication history completed by Ambulatory Clinic Pharmacist over the phone on 25. Patient has upcoming AWV with PCP on 25.     After thorough medication review, 4 discrepancies have been identified and corrected on patient's medication list. See updated list below:     Outpatient Encounter Medications as of 2025   Medication Sig    ammonium lactate 12 % External Lotion Apply topically as needed for Dry Skin.    furosemide 40 MG Oral Tab Take 1 tablet (40 mg total) by mouth daily.    Sotalol HCl 160 MG Oral Tab Take 1 tablet (160 mg total) by mouth daily.    cholecalciferol 25 MCG (1000 UT) Oral Tab Take 1 tablet (1,000 Units total) by mouth daily.    atorvastatin 20 MG Oral Tab Take 1 tablet (20 mg total) by mouth daily.    potassium chloride 20 MEQ Oral Tab CR Take 1 tablet (20 mEq total) by mouth daily.    amLODIPine 5 MG Oral Tab Take 1 tablet (5 mg total) by mouth daily.    LATANOPROST 0.005 % Ophthalmic Solution Place 1 drop into both eyes nightly    ELIQUIS 5 MG Oral Tab Take 1 tablet (5 mg total) by mouth 2 (two) times daily.    finasteride 5 MG Oral Tab Take 1 tablet (5 mg total) by mouth daily.    tamsulosin (FLOMAX) cap Take 1 capsule (0.4 mg total) by mouth daily.    ferrous sulfate 325 (65 FE) MG Oral Tab EC Take 1 tablet (325 mg total) by mouth daily with breakfast. (Patient not taking: Reported on 2025)     Medication Assessment:   Reviewed all medications in detail with patient including dose, indication, timing of administration, monitoring parameters, and potential side effects of medications.     Patient reports he is taking amlodipine 5 mg daily, Eliquis 5 mg twice daily, furosemide 40 mg daily and sotalol 160 mg daily as prescribed. He does monitor his blood pressure at home. Instructed patient to bring any recent readings in to PCP appointment for  review.     Did provide education and stressed the importance of taking medication just like prescribed to get the most benefit. Patient denies forgetting or missing medication doses and denies any questions or concerns with medications at this time.     Thank you,    Deepika Rodriguez, PharmD, 2/25/2025, 11:12 AM    x

## 2025-02-26 ENCOUNTER — OFFICE VISIT (OUTPATIENT)
Dept: INTERNAL MEDICINE CLINIC | Facility: CLINIC | Age: 85
End: 2025-02-26
Payer: MEDICARE

## 2025-02-26 VITALS
HEIGHT: 69 IN | BODY MASS INDEX: 30.51 KG/M2 | OXYGEN SATURATION: 94 % | WEIGHT: 206 LBS | SYSTOLIC BLOOD PRESSURE: 138 MMHG | DIASTOLIC BLOOD PRESSURE: 78 MMHG | HEART RATE: 64 BPM

## 2025-02-26 DIAGNOSIS — E78.00 HYPERCHOLESTEROLEMIA: ICD-10-CM

## 2025-02-26 DIAGNOSIS — N40.0 BENIGN PROSTATIC HYPERPLASIA WITHOUT LOWER URINARY TRACT SYMPTOMS: ICD-10-CM

## 2025-02-26 DIAGNOSIS — I10 ESSENTIAL HYPERTENSION: ICD-10-CM

## 2025-02-26 DIAGNOSIS — I48.0 PAROXYSMAL ATRIAL FIBRILLATION (HCC): Primary | ICD-10-CM

## 2025-02-26 PROBLEM — D50.9 IRON DEFICIENCY ANEMIA: Status: RESOLVED | Noted: 2024-06-01 | Resolved: 2025-02-26

## 2025-02-26 PROBLEM — I70.0 AORTIC ATHEROSCLEROSIS: Status: RESOLVED | Noted: 2017-10-31 | Resolved: 2025-02-26

## 2025-02-26 PROCEDURE — G2211 COMPLEX E/M VISIT ADD ON: HCPCS | Performed by: INTERNAL MEDICINE

## 2025-02-26 PROCEDURE — 99214 OFFICE O/P EST MOD 30 MIN: CPT | Performed by: INTERNAL MEDICINE

## 2025-02-26 NOTE — PROGRESS NOTES
Alberto Bray is a 85 year old male.  Chief Complaint   Patient presents with    [facility]     HPI:     History of Present Illness  The patient, with a history of hypertension, hyperlipidemia, atrial fibrillation, and benign prostatic hyperplasia, presents to [facility]. Prev under Dr Flores  He reports no issues with palpitations or racing heart. He is currently on meds for hypertension, atorvastatin for hyperlipidemia, sotalol and Eliquis for atrial fibrillation, and finasteride and tamsulosin for benign prostatic hyperplasia. He has a pacemaker and has had no recent issues. He has not been taking iron tablets recently.   No cp or sob, no palpitation , no abd pain   No bleeding or falls reported     Current Outpatient Medications   Medication Sig Dispense Refill    ammonium lactate 12 % External Lotion Apply topically as needed for Dry Skin.      furosemide 40 MG Oral Tab Take 1 tablet (40 mg total) by mouth daily.      Sotalol HCl 160 MG Oral Tab Take 1 tablet (160 mg total) by mouth daily.      cholecalciferol 25 MCG (1000 UT) Oral Tab Take 1 tablet (1,000 Units total) by mouth daily.      atorvastatin 20 MG Oral Tab Take 1 tablet (20 mg total) by mouth daily. 90 tablet 1    potassium chloride 20 MEQ Oral Tab CR Take 1 tablet (20 mEq total) by mouth daily. 90 tablet 3    amLODIPine 5 MG Oral Tab Take 1 tablet (5 mg total) by mouth daily. 90 tablet 3    LATANOPROST 0.005 % Ophthalmic Solution Place 1 drop into both eyes nightly 3 each 3    ELIQUIS 5 MG Oral Tab Take 1 tablet (5 mg total) by mouth 2 (two) times daily.  5    finasteride 5 MG Oral Tab Take 1 tablet (5 mg total) by mouth daily.      tamsulosin (FLOMAX) cap Take 1 capsule (0.4 mg total) by mouth daily.        Past Medical History:    Aortic atherosclerosis    CXR 5-14    ASHD (arteriosclerotic heart disease)    Cardiac CT angiography 9-15 with 40-60% mid LAD stenosis; Lexiscan GXT 8-22 with small fixed apical inferolateral perfusion  defect likely artifactual, with EF 30%; CTA coronary arteries  with less than 20% LMT stenosis, diffuse calcified plaque RCA and LAD, with normal FFR    BPH (benign prostatic hyperplasia)    Chronic kidney disease, stage 3 (HCC)    Essential hypertension    Glaucoma    17 Started Latanoprost OS qhs due to OCT thinning OS 18 Start Latanprost qhs OD due to thinning of RNFL OD     Hypercholesterolemia    Iron deficiency anemia    Left ventricular hypertrophy    Echo  with mild LVH, normal LV function, EF 55-60%, mild MR, severe LAE, mild TR with peak pulmonary artery pressure 38 mmHg    Paroxysmal atrial fibrillation (HCC)    Status post ablation     Pulmonary hypertension (HCC)    Echo  with normal LV function, EF 53%, mild LAE, mild MR, moderate pulmonary hypertension with PAP 52 mmHg    Second degree AV block    Status post pacemaker placement 5-14    Thrombocytopenia      Past Surgical History:   Procedure Laterality Date    Appendectomy  1968    Cardiac pacemaker placement  2014    Cataract extraction w/ intraocular lens implant Right 2023    Dr. Gar @ [facility]    Ep a-flutter ablation  2022         Ep cardioversion 1x  08/16/2021         Ep cardioversion 1x  09/22/2021         Ep generator change  2022         Ep pulmonary vein/a-fib ablation  2022         Tonsillectomy  Childhood      Social History:  Social History     Socioeconomic History    Marital status:    Occupational History    Occupation: Retired dentist   Tobacco Use    Smoking status: Former     Current packs/day: 0.00     Average packs/day: 0.1 packs/day for 28.0 years (2.8 ttl pk-yrs)     Types: Cigarettes     Start date: 1970     Quit date: 1998     Years since quittin.1    Smokeless tobacco: Never   Vaping Use    Vaping status: Never Used   Substance and Sexual Activity    Alcohol use: Yes     Alcohol/week: 0.0 standard drinks of alcohol     Comment: 2 glasses of wine most  days    Drug use: No   Other Topics Concern    Caffeine Concern Yes     Comment: 6cups       Family History   Problem Relation Age of Onset    Diabetes Father     Diabetes Paternal Aunt     Hypertension Mother     Other (CVA) Mother         Age 48    Colon Cancer Paternal Grandmother     Other (Lung Cancer) Paternal Grandfather     Macular degeneration Cousin     Glaucoma Neg       Allergies[1]     REVIEW OF SYSTEMS:   Review of Systems   Review of Systems   Constitutional: Negative for activity change, appetite change and fever.   HENT: Negative for congestion and voice change.    Respiratory: Negative for cough and shortness of breath.    Cardiovascular: Negative for chest pain.   Gastrointestinal: Negative for abdominal distention, abdominal pain and vomiting.   Genitourinary: Negative for hematuria.   Skin: Negative for wound.   Psychiatric/Behavioral: Negative for behavioral problems.   Wt Readings from Last 5 Encounters:   02/26/25 206 lb (93.4 kg)   08/28/24 205 lb 9.6 oz (93.3 kg)   06/11/24 204 lb 12.8 oz (92.9 kg)   01/16/24 210 lb (95.3 kg)   12/26/23 200 lb (90.7 kg)     Body mass index is 30.42 kg/m².      EXAM:   /78   Pulse 64   Ht 5' 9\" (1.753 m)   Wt 206 lb (93.4 kg)   SpO2 94%   BMI 30.42 kg/m²   Physical Exam   Constitutional:       Appearance: Normal appearance.   HENT:      Head: Normocephalic.   Eyes:      Conjunctiva/sclera: Conjunctivae normal.   Cardiovascular:      Rate and Rhythm: Normal rate and regular rhythm.      Heart sounds: Normal heart sounds. No murmur heard.  Pulmonary:      Effort: Pulmonary effort is normal.      Breath sounds: Normal breath sounds. No rhonchi or rales.   Abdominal:      General: Bowel sounds are normal.      Palpations: Abdomen is soft.      Tenderness: There is no abdominal tenderness.   Musculoskeletal:      Cervical back: Neck supple.   Edema present BL   Skin:     General: Skin is warm and dry.   Neurological:      General: No focal deficit  present.      Mental Status: He is alert and oriented to person, place, and time. Mental status is at baseline.   Psychiatric:         Mood and Affect: Mood normal.         Behavior: Behavior normal.   Pacemaker present    ASSESSMENT AND PLAN:   1. Paroxysmal atrial fibrillation (HCC)      2. Hypercholesterolemia    - CBC, Platelet; No Differential; Future  - Comp Metabolic Panel (14); Future  - Lipid Panel; Future  - TSH W Reflex To Free T4; Future    3. Essential hypertension    - CBC, Platelet; No Differential; Future  - Comp Metabolic Panel (14); Future  - Lipid Panel; Future  - TSH W Reflex To Free T4; Future    4. Benign prostatic hyperplasia without lower urinary tract symptoms      Assessment & Plan  Hypertension  Well controlled on Lisinopril.  -Continue current regimen.  Monitor kidney functions    Hyperlipidemia  On Atorvastatin.  -Continue current regimen.  Monitor lipid profile and LFTs    Atrial Fibrillation  No reported palpitations or racing heart. On Sotalol and Eliquis.  -Continue current regimen.  Discussed fall prevention    Pacemaker  Indications not discussed in the conversation.  -Plan to discuss indications at next visit.    Benign Prostatic Hyperplasia  On Finasteride and Tamsulosin.  Stable  -Continue current regimen.    General Health Maintenance  -Order blood tests to be done a week before next visit in August.  -Advise patient to avoid heavy meals before blood testing.  -Continue follow-up with Dr. Gar.     Plan: As above.  I will see him back in 6 months.  Meanwhile if there is a concerns he will contact me.  I will follow-up on the labs.    The patient indicates understanding of these issues and agrees to the plan.  No follow-ups on file.    This note was prepared using Dragon Medical voice recognition dictation software. As a result errors may occur. When identified these errors have been corrected. While every attempt is made to correct errors during dictation discrepancies may still  exist.         [1]   Allergies  Allergen Reactions    Azithromycin Dihydrate [Azithromycin] SWELLING    Penicillin G RASH and SWELLING    Meperidine Hcl  [Demerol] SWELLING

## 2025-04-30 ENCOUNTER — APPOINTMENT (OUTPATIENT)
Dept: GENERAL RADIOLOGY | Facility: HOSPITAL | Age: 85
End: 2025-04-30
Attending: EMERGENCY MEDICINE
Payer: MEDICARE

## 2025-04-30 ENCOUNTER — APPOINTMENT (OUTPATIENT)
Dept: CV DIAGNOSTICS | Facility: HOSPITAL | Age: 85
End: 2025-04-30
Attending: INTERNAL MEDICINE
Payer: MEDICARE

## 2025-04-30 ENCOUNTER — HOSPITAL ENCOUNTER (INPATIENT)
Facility: HOSPITAL | Age: 85
LOS: 2 days | Discharge: HOME | End: 2025-05-02
Attending: EMERGENCY MEDICINE | Admitting: INTERNAL MEDICINE
Payer: MEDICARE

## 2025-04-30 DIAGNOSIS — D64.9 ANEMIA, UNSPECIFIED TYPE: ICD-10-CM

## 2025-04-30 DIAGNOSIS — I50.9 HEART FAILURE, UNSPECIFIED HF CHRONICITY, UNSPECIFIED HEART FAILURE TYPE (HCC): Primary | ICD-10-CM

## 2025-04-30 DIAGNOSIS — J96.01 ACUTE HYPOXEMIC RESPIRATORY FAILURE (HCC): ICD-10-CM

## 2025-04-30 LAB
ALBUMIN SERPL-MCNC: 4.3 G/DL (ref 3.2–4.8)
ALBUMIN/GLOB SERPL: 1.8 {RATIO} (ref 1–2)
ALP LIVER SERPL-CCNC: 105 U/L (ref 45–117)
ALT SERPL-CCNC: 16 U/L (ref 10–49)
ANION GAP SERPL CALC-SCNC: 10 MMOL/L (ref 0–18)
AST SERPL-CCNC: 26 U/L (ref ?–34)
ATRIAL RATE: 60 BPM
BASOPHILS # BLD AUTO: 0.05 X10(3) UL (ref 0–0.2)
BASOPHILS NFR BLD AUTO: 0.9 %
BILIRUB SERPL-MCNC: 0.9 MG/DL (ref 0.2–1.1)
BUN BLD-MCNC: 31 MG/DL (ref 9–23)
BUN/CREAT SERPL: 22.6 (ref 10–20)
CALCIUM BLD-MCNC: 9.1 MG/DL (ref 8.7–10.4)
CHLORIDE SERPL-SCNC: 108 MMOL/L (ref 98–112)
CO2 SERPL-SCNC: 26 MMOL/L (ref 21–32)
CREAT BLD-MCNC: 1.37 MG/DL (ref 0.7–1.3)
DEPRECATED RDW RBC AUTO: 46.7 FL (ref 35.1–46.3)
EGFRCR SERPLBLD CKD-EPI 2021: 51 ML/MIN/1.73M2 (ref 60–?)
EOSINOPHIL # BLD AUTO: 0.14 X10(3) UL (ref 0–0.7)
EOSINOPHIL NFR BLD AUTO: 2.6 %
ERYTHROCYTE [DISTWIDTH] IN BLOOD BY AUTOMATED COUNT: 14.5 % (ref 11–15)
FLUAV + FLUBV RNA SPEC NAA+PROBE: NEGATIVE
FLUAV + FLUBV RNA SPEC NAA+PROBE: NEGATIVE
GLOBULIN PLAS-MCNC: 2.4 G/DL (ref 2–3.5)
GLUCOSE BLD-MCNC: 111 MG/DL (ref 70–99)
HCT VFR BLD AUTO: 29.2 % (ref 39–53)
HGB BLD-MCNC: 8.7 G/DL (ref 13–17.5)
IMM GRANULOCYTES # BLD AUTO: 0.01 X10(3) UL (ref 0–1)
IMM GRANULOCYTES NFR BLD: 0.2 %
LYMPHOCYTES # BLD AUTO: 0.57 X10(3) UL (ref 1–4)
LYMPHOCYTES NFR BLD AUTO: 10.7 %
MAGNESIUM SERPL-MCNC: 2.3 MG/DL (ref 1.6–2.6)
MCH RBC QN AUTO: 26.3 PG (ref 26–34)
MCHC RBC AUTO-ENTMCNC: 29.8 G/DL (ref 31–37)
MCV RBC AUTO: 88.2 FL (ref 80–100)
MONOCYTES # BLD AUTO: 0.69 X10(3) UL (ref 0.1–1)
MONOCYTES NFR BLD AUTO: 12.9 %
NEUTROPHILS # BLD AUTO: 3.89 X10 (3) UL (ref 1.5–7.7)
NEUTROPHILS # BLD AUTO: 3.89 X10(3) UL (ref 1.5–7.7)
NEUTROPHILS NFR BLD AUTO: 72.7 %
NT-PROBNP SERPL-MCNC: 2693 PG/ML (ref ?–450)
OSMOLALITY SERPL CALC.SUM OF ELEC: 305 MOSM/KG (ref 275–295)
P AXIS: 44 DEGREES
P-R INTERVAL: 178 MS
PLATELET # BLD AUTO: 308 10(3)UL (ref 150–450)
POTASSIUM SERPL-SCNC: 4.6 MMOL/L (ref 3.5–5.1)
PROT SERPL-MCNC: 6.7 G/DL (ref 5.7–8.2)
Q-T INTERVAL: 524 MS
QRS DURATION: 162 MS
QTC CALCULATION (BEZET): 524 MS
R AXIS: -69 DEGREES
RBC # BLD AUTO: 3.31 X10(6)UL (ref 3.8–5.8)
RSV RNA SPEC NAA+PROBE: NEGATIVE
SARS-COV-2 RNA RESP QL NAA+PROBE: NOT DETECTED
SODIUM SERPL-SCNC: 144 MMOL/L (ref 136–145)
T AXIS: 86 DEGREES
TROPONIN I SERPL HS-MCNC: 12 NG/L (ref ?–53)
VENTRICULAR RATE: 60 BPM
WBC # BLD AUTO: 5.4 X10(3) UL (ref 4–11)

## 2025-04-30 PROCEDURE — 71045 X-RAY EXAM CHEST 1 VIEW: CPT | Performed by: EMERGENCY MEDICINE

## 2025-04-30 PROCEDURE — 93306 TTE W/DOPPLER COMPLETE: CPT | Performed by: INTERNAL MEDICINE

## 2025-04-30 PROCEDURE — 99223 1ST HOSP IP/OBS HIGH 75: CPT | Performed by: INTERNAL MEDICINE

## 2025-04-30 RX ORDER — FUROSEMIDE 10 MG/ML
40 INJECTION INTRAMUSCULAR; INTRAVENOUS
Status: DISCONTINUED | OUTPATIENT
Start: 2025-04-30 | End: 2025-05-02

## 2025-04-30 RX ORDER — HYDROCODONE BITARTRATE AND ACETAMINOPHEN 5; 325 MG/1; MG/1
2 TABLET ORAL EVERY 4 HOURS PRN
Refills: 0 | Status: DISCONTINUED | OUTPATIENT
Start: 2025-04-30 | End: 2025-05-02

## 2025-04-30 RX ORDER — IPRATROPIUM BROMIDE AND ALBUTEROL SULFATE 2.5; .5 MG/3ML; MG/3ML
3 SOLUTION RESPIRATORY (INHALATION) ONCE
Status: COMPLETED | OUTPATIENT
Start: 2025-04-30 | End: 2025-04-30

## 2025-04-30 RX ORDER — ATORVASTATIN CALCIUM 20 MG/1
20 TABLET, FILM COATED ORAL DAILY
Status: DISCONTINUED | OUTPATIENT
Start: 2025-04-30 | End: 2025-05-02

## 2025-04-30 RX ORDER — FUROSEMIDE 10 MG/ML
40 INJECTION INTRAMUSCULAR; INTRAVENOUS ONCE
Status: COMPLETED | OUTPATIENT
Start: 2025-04-30 | End: 2025-04-30

## 2025-04-30 RX ORDER — SOTALOL HYDROCHLORIDE 80 MG/1
160 TABLET ORAL DAILY
Status: DISCONTINUED | OUTPATIENT
Start: 2025-04-30 | End: 2025-05-02

## 2025-04-30 RX ORDER — ACETAMINOPHEN 500 MG
500 TABLET ORAL EVERY 4 HOURS PRN
Status: DISCONTINUED | OUTPATIENT
Start: 2025-04-30 | End: 2025-05-02

## 2025-04-30 RX ORDER — ACETAMINOPHEN 325 MG/1
650 TABLET ORAL EVERY 4 HOURS PRN
Status: DISCONTINUED | OUTPATIENT
Start: 2025-04-30 | End: 2025-05-02

## 2025-04-30 RX ORDER — TAMSULOSIN HYDROCHLORIDE 0.4 MG/1
0.4 CAPSULE ORAL DAILY
Status: DISCONTINUED | OUTPATIENT
Start: 2025-04-30 | End: 2025-05-01

## 2025-04-30 RX ORDER — BISACODYL 10 MG
10 SUPPOSITORY, RECTAL RECTAL
Status: DISCONTINUED | OUTPATIENT
Start: 2025-04-30 | End: 2025-05-02

## 2025-04-30 RX ORDER — FINASTERIDE 5 MG/1
5 TABLET, FILM COATED ORAL DAILY
Status: DISCONTINUED | OUTPATIENT
Start: 2025-04-30 | End: 2025-05-01

## 2025-04-30 RX ORDER — SENNOSIDES 8.6 MG
17.2 TABLET ORAL NIGHTLY PRN
Status: DISCONTINUED | OUTPATIENT
Start: 2025-04-30 | End: 2025-05-02

## 2025-04-30 RX ORDER — POLYETHYLENE GLYCOL 3350 17 G/17G
17 POWDER, FOR SOLUTION ORAL DAILY PRN
Status: DISCONTINUED | OUTPATIENT
Start: 2025-04-30 | End: 2025-05-02

## 2025-04-30 RX ORDER — SODIUM PHOSPHATE, DIBASIC AND SODIUM PHOSPHATE, MONOBASIC 7; 19 G/230ML; G/230ML
1 ENEMA RECTAL ONCE AS NEEDED
Status: DISCONTINUED | OUTPATIENT
Start: 2025-04-30 | End: 2025-05-02

## 2025-04-30 RX ORDER — HYDROCODONE BITARTRATE AND ACETAMINOPHEN 5; 325 MG/1; MG/1
1 TABLET ORAL EVERY 4 HOURS PRN
Refills: 0 | Status: DISCONTINUED | OUTPATIENT
Start: 2025-04-30 | End: 2025-05-02

## 2025-04-30 RX ORDER — LATANOPROST 50 UG/ML
1 SOLUTION/ DROPS OPHTHALMIC NIGHTLY
Status: DISCONTINUED | OUTPATIENT
Start: 2025-04-30 | End: 2025-05-02

## 2025-04-30 NOTE — ED INITIAL ASSESSMENT (HPI)
Patient with c/o of shortness of breath that started last night. Expiratory wheezing noted in triage.

## 2025-04-30 NOTE — ED QUICK NOTES
Orders for admission, patient is aware of plan and ready to go upstairs. Any questions, please call ED ESTELLA Diaz  at extension [phone].     Type of COVID test sent:  COVID Suspicion level: negative    Titratable drug(s) infusing: n/a  Rate:    LOC at time of transport: alert and oriented x4    Other pertinent information    CIWA score=   NIH score=

## 2025-04-30 NOTE — ED PROVIDER NOTES
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  Patient reports shortness of breath over the past year, however acutely worsening last night.  Occasional cough, denies chest pain or pressure.  He has had lower extremity swelling which he notes over the last several months as well.      History of Present Illness               Objective:     Past Medical History:    Aortic atherosclerosis    CXR 5-14    ASHD (arteriosclerotic heart disease)    Cardiac CT angiography 9-15 with 40-60% mid LAD stenosis; Lexiscan GXT 8-22 with small fixed apical inferolateral perfusion defect likely artifactual, with EF 30%; CTA coronary arteries 12-23 with less than 20% LMT stenosis, diffuse calcified plaque RCA and LAD, with normal FFR    BPH (benign prostatic hyperplasia)    Chronic kidney disease, stage 3 (HCC)    Essential hypertension    Glaucoma    12/20/17 Started Latanoprost OS qhs due to OCT thinning OS 12/13/18 Start Latanprost qhs OD due to thinning of RNFL OD     Hypercholesterolemia    Iron deficiency anemia    Left ventricular hypertrophy    Echo 7-18 with mild LVH, normal LV function, EF 55-60%, mild MR, severe LAE, mild TR with peak pulmonary artery pressure 38 mmHg    Paroxysmal atrial fibrillation (HCC)    Status post ablation 11-22    Pulmonary hypertension (HCC)    Echo 8-22 with normal LV function, EF 53%, mild LAE, mild MR, moderate pulmonary hypertension with PAP 52 mmHg    Second degree AV block    Status post pacemaker placement 5-14    Thrombocytopenia              Past Surgical History:   Procedure Laterality Date    Appendectomy  1968    Cardiac pacemaker placement  05/2014     Cataract extraction w/ intraocular lens implant Right 2023    Dr. Gar @ [facility]    Ep a-flutter ablation  2022         Ep cardioversion 1x  08/16/2021         Ep cardioversion 1x  09/22/2021         Ep generator change  2022         Ep pulmonary vein/a-fib ablation  2022         Tonsillectomy  Childhood                Social History     Socioeconomic History    Marital status:    Occupational History    Occupation: Retired dentist   Tobacco Use    Smoking status: Former     Current packs/day: 0.00     Average packs/day: 0.1 packs/day for 28.0 years (2.8 ttl pk-yrs)     Types: Cigarettes     Start date: 1970     Quit date: 1998     Years since quittin.3    Smokeless tobacco: Never   Vaping Use    Vaping status: Never Used   Substance and Sexual Activity    Alcohol use: Yes     Alcohol/week: 0.0 standard drinks of alcohol     Comment: 2 glasses of wine most days    Drug use: No   Other Topics Concern    Caffeine Concern Yes     Comment: 6cups      Social Drivers of Health     Food Insecurity: No Food Insecurity (2025)    NCSS - Food Insecurity     Worried About Running Out of Food in the Last Year: No     Ran Out of Food in the Last Year: No   Transportation Needs: No Transportation Needs (2025)    NCSS - Transportation     Lack of Transportation: No   Housing Stability: Not At Risk (2025)    NCSS - Housing/Utilities     Has Housing: Yes     Worried About Losing Housing: No     Unable to Get Utilities: No                                Physical Exam     ED Triage Vitals [25 0632]   BP (!) 167/76   Pulse 60   Resp (!) 28   Temp 98.3 °F (36.8 °C)   Temp src Oral   SpO2 93 %   O2 Device None (Room air)       Current Vitals:   Vital Signs  BP: 137/59  Pulse: 60  Resp: 22  Temp: 96.9 °F (36.1 °C)  Temp src: Oral  MAP (mmHg): 83    Oxygen Therapy  SpO2: 96 %  O2 Device: Nasal cannula  O2 Flow Rate (L/min): 2 L/min        Physical Exam  Vitals and nursing note  reviewed.   Constitutional:       General: He is not in acute distress.     Appearance: He is well-developed.   HENT:      Head: Normocephalic and atraumatic.   Eyes:      Conjunctiva/sclera: Conjunctivae normal.   Cardiovascular:      Rate and Rhythm: Normal rate and regular rhythm.      Heart sounds: Normal heart sounds.   Pulmonary:      Effort: Tachypnea present. No respiratory distress.      Breath sounds: Decreased breath sounds present.   Abdominal:      General: Bowel sounds are normal.      Palpations: Abdomen is soft.      Tenderness: There is no abdominal tenderness.   Musculoskeletal:         General: Normal range of motion.      Cervical back: Normal range of motion and neck supple.   Skin:     General: Skin is warm and dry.      Findings: No rash.   Neurological:      General: No focal deficit present.      Mental Status: He is alert and oriented to person, place, and time.           Physical Exam                ED Course     Labs Reviewed   CBC WITH DIFFERENTIAL WITH PLATELET - Abnormal; Notable for the following components:       Result Value    RBC 3.31 (*)     HGB 8.7 (*)     HCT 29.2 (*)     MCHC 29.8 (*)     RDW-SD 46.7 (*)     Lymphocyte Absolute 0.57 (*)     All other components within normal limits   COMP METABOLIC PANEL (14) - Abnormal; Notable for the following components:    Glucose 111 (*)     BUN 31 (*)     Creatinine 1.37 (*)     BUN/CREA Ratio 22.6 (*)     Calculated Osmolality 305 (*)     eGFR-Cr 51 (*)     All other components within normal limits   PRO BETA NATRIURETIC PEPTIDE - Abnormal; Notable for the following components:    Pro-Beta Natriuretic Peptide 2,693 (*)     All other components within normal limits   TROPONIN I HIGH SENSITIVITY - Normal   MAGNESIUM - Normal   SARS-COV-2/FLU A AND B/RSV BY PCR (GENEXPERT) - Normal    Narrative:     This test is intended for the qualitative detection and differentiation of SARS-CoV-2, influenza A, influenza B, and respiratory syncytial  virus (RSV) viral RNA in nasopharyngeal or nares swabs from individuals suspected of respiratory viral infection consistent with COVID-19 by their healthcare provider. Signs and symptoms of respiratory viral infection due to SARS-CoV-2, influenza, and RSV can be similar.    Test performed using the Xpert Xpress SARS-CoV-2/FLU/RSV (real time RT-PCR)  assay on the GeneXpert instrument, [employer], [place] [address].   This test is being used under the Food and Drug Administration's Emergency Use Authorization.    The authorized Fact Sheet for Healthcare Providers for this assay is available upon request from the laboratory.   RAINBOW DRAW LAVENDER   RAINBOW DRAW LIGHT GREEN   RAINBOW DRAW BLUE     EKG    Rate, intervals and axes as noted on EKG Report.  Rate: 60  Rhythm: paced  Reading: paced EKG              Results            Imaging Results Available and Reviewed while in ED:   XR CHEST AP PORTABLE  (CPT=71045)   Final Result   PROCEDURE: XR CHEST AP PORTABLE  (CPT=71045)   TIME: 653 hours.         COMPARISON: [facility], XR CHEST PA + LAT CHEST    (CPT=71046), 11/01/2022, 9:39 AM.  [facility], XR RIBS WITH CHEST, BILATERAL (MQH=69238), 8/15/2020, 12:50 PM.     [facility],    X CHEST PA LAT ROUTINE, 5/12/2014, 12:33 PM.       INDICATIONS: Shortness of breath and wheezing since last night.       TECHNIQUE:   Single view.         FINDINGS:    CARDIAC/MEDIAST: Borderline cardiomegaly.  Mildly tortuous thoracic aorta.     Dual lead left chest pacemaker again noted.    LUNGS/PLEURA:  Mild diffuse bilateral interstitial opacity.  No    significant pleural effusion.  No pneumothorax.  Mild linear opacity in    the right mid lung probably atelectasis or scarring.   OTHER:  Degenerative change osseous structures.                   =====   CONCLUSION:        Borderline cardiomegaly with mild diffuse bilateral interstitial opacity    which  may reflect interstitial edema.               Dictated by (CST): Demian Santiago MD on 4/30/2025 at 7:17 AM        Finalized by (CST): Demian Santiago MD on 4/30/2025 at 7:19 AM                   ED Medications Administered:   Medications   atorvastatin (Lipitor) tab 20 mg (20 mg Oral Not Given 4/30/25 1118)   apixaban (Eliquis) tab 5 mg (5 mg Oral Not Given 4/30/25 1118)   finasteride (Proscar) tab 5 mg (5 mg Oral Not Given 4/30/25 1118)   latanoprost (Xalatan) 0.005 % ophthalmic solution 1 drop (has no administration in time range)   sotalol (Betapace) tab 160 mg (160 mg Oral Not Given 4/30/25 1117)   tamsulosin (Flomax) cap 0.4 mg (0.4 mg Oral Not Given 4/30/25 1117)   furosemide (Lasix) 10 mg/mL injection 40 mg (has no administration in time range)   acetaminophen (Tylenol Extra Strength) tab 500 mg (has no administration in time range)   acetaminophen (Tylenol) tab 650 mg (has no administration in time range)     Or   HYDROcodone-acetaminophen (Norco) 5-325 MG per tab 1 tablet (has no administration in time range)     Or   HYDROcodone-acetaminophen (Norco) 5-325 MG per tab 2 tablet (has no administration in time range)   polyethylene glycol (PEG 3350) (Miralax) 17 g oral packet 17 g (has no administration in time range)   sennosides (Senokot) tab 17.2 mg (has no administration in time range)   bisacodyl (Dulcolax) 10 MG rectal suppository 10 mg (has no administration in time range)   fleet enema (Fleet) rectal enema 133 mL (has no administration in time range)   ipratropium-albuterol (Duoneb) 0.5-2.5 (3) MG/3ML inhalation solution 3 mL (3 mL Nebulization Given 4/30/25 0655)   furosemide (Lasix) 10 mg/mL injection 40 mg (40 mg Intravenous Given 4/30/25 0747)         Vitals:    04/30/25 0745 04/30/25 0830 04/30/25 0850 04/30/25 1107   BP: 128/62  137/59    BP Location:   Right arm    Pulse: 60 60     Resp: 23  22    Temp:   96.9 °F (36.1 °C)    TempSrc:   Oral    SpO2: 98% 97% 96%    Weight:   95.5 kg 95.5 kg      *I personally reviewed and interpreted all ED vitals.                       [facility]          Admission disposition: 4/30/2025  7:55 AM           Medical Decision Making  Differential diagnosis includes but is not limited to heart failure, cardiomyopathy, pleural effusion, ACS, renal failure, pneumothorax, pneumonia, PE    Patient feeling much better on supplemental nasal cannula oxygen, noted to have elevated BNP and chest x-ray concerning for pulmonary edema.  Given Lasix.  Of note patient's hemoglobin dropped from previous labs, patient denies black or bloody stool.  Discussed with and admitted to hospitalist.  Discussed with LEATHA ALONSO, no further recommendations for the emergency department.    Problems Addressed:  Acute hypoxemic respiratory failure (HCC): acute illness or injury     Details: Differential diagnoses encompassed high-risk conditions with potential threats to life, limb, or major bodily functions, warranting comprehensive evaluation and high-complexity medical decision-making.    Anemia, unspecified type: acute illness or injury  Heart failure, unspecified HF chronicity, unspecified heart failure type (HCC): acute illness or injury    Amount and/or Complexity of Data Reviewed  External Data Reviewed: labs.     Details: Drop in hemoglobin, today 8.7 compared to 13.2 on 8/28/2024, prior to that was 10.2 on 6/11/2024  Labs: ordered.  Radiology: ordered and independent interpretation performed.     Details: Chest x-ray image reviewed, no obvious pneumothorax, other incidental findings for to radiology  ECG/medicine tests: ordered and independent interpretation performed. Decision-making details documented in ED Course.  Discussion of management or test interpretation with external provider(s): Discussed with hospitalist and cardiology        Disposition and Plan     Clinical Impression:  1. Heart failure, unspecified HF chronicity, unspecified heart failure type (HCC)    2. Anemia, unspecified type    3.  Acute hypoxemic respiratory failure (HCC)         Disposition:  Admit  4/30/2025  7:55 am    Follow-up:  No follow-up provider specified.  We recommend that you schedule follow up care with a primary care provider within the next three months to obtain basic health screening including reassessment of your blood pressure.      Medications Prescribed:  Current Discharge Medication List          Supplementary Documentation:         Hospital Problems       Present on Admission  Date Reviewed: 3/3/2025          ICD-10-CM Noted POA    * (Principal) Heart failure, unspecified HF chronicity, unspecified heart failure type (HCC) I50.9 4/30/2025 Unknown    Acute hypoxemic respiratory failure (HCC) J96.01 4/30/2025 Unknown    Anemia, unspecified type D64.9 4/30/2025 Unknown

## 2025-04-30 NOTE — HISTORICAL OFFICE NOTE
CHIEF COMPLAINT    CHIEF COMPLAINT  Reason for Visit/Chief Complaint   F/U  SSS  CHB  AF  HTN  HL  Pacemaker   The patient returns to follow-up the above conditions. He has lower extremity edema, mild exertional dyspnea and fatigue. He has no chest pain, orthopnea, PND, palpitations, lightheadedness, or syncope.     PROBLEMS  Reconcile with Patient's ChartPROBLEMS  Problem Effective Dates Date resolved Problem Status   CAD native (coronary artery disease), [SNOMED-CT: 04931604] 7/26/2022 - Active   Abnormal nuclear stress test, [SNOMED-CT: 348199431] 11/9/2023 - Active   Abnormal computed tomography angiography of heart, [SNOMED-CT: 186824147] 11/11/2024 - Active   Persistent atrial fibrillation, [SNOMED-CT: 063142564] 8/5/2021 - Active   Atrial Flutter, unspecified, [SNOMED-CT: 9148189] 9/7/2021 - Active   Status post circumferential ablation of pulmonary vein, [SNOMED-CT: 577992509] 12/6/2022 - Active   S/P ablation of atrial flutter, [SNOMED-CT: 199929227] 12/6/2022 - Active   Third degree atrioventricular block, [SNOMED-CT: 64146466] 6/27/2019 - Active   Essential hypertension, [SNOMED-CT: 38426412] 6/27/2019 - Active   Dyslipidemia, [SNOMED-CT: 527616821] 6/27/2019 - Active   Pacemaker, [SNOMED-CT: 651139233] 7/2/2019 - Active   Long term current use of antiarrhythmic drug, [SNOMED-CT: 006549975] 6/8/2023 - Active   Anticoagulation, long-term (current) use, [SNOMED-CT: 779810545] 9/14/2021 - Active     ENCOUNTER    ENCOUNTER  Problem Effective Dates Date resolved Problem Status   CAD native (coronary artery disease), [SNOMED-CT: 78197991] 7/26/2022 - Active   Abnormal nuclear stress test, [SNOMED-CT: 449001711] 11/9/2023 - Active   Abnormal computed tomography angiography of heart, [SNOMED-CT: 835991666] 11/11/2024 - Active   Persistent atrial fibrillation, [SNOMED-CT: 437306908] 8/5/2021 - Active   Atrial Flutter, unspecified, [SNOMED-CT: 4780515] 9/7/2021 - Active   Status post circumferential ablation of  pulmonary vein, [SNOMED-CT: 441976558] 12/6/2022 - Active   S/P ablation of atrial flutter, [SNOMED-CT: 296171482] 12/6/2022 - Active   Third degree atrioventricular block, [SNOMED-CT: 16059513] 6/27/2019 - Active   Essential hypertension, [SNOMED-CT: 53621136] 6/27/2019 - Active   Dyslipidemia, [SNOMED-CT: 965763925] 6/27/2019 - Active   Pacemaker, [SNOMED-CT: 058525429] 7/2/2019 - Active   Long term current use of antiarrhythmic drug, [SNOMED-CT: 724664453] 6/8/2023 - Active   Anticoagulation, long-term (current) use, [SNOMED-CT: 677684857] 9/14/2021 - Active     VITAL SIGNS    VITAL SIGNS  Date / Time: 11/11/2024   BP Systolic 112 mmHg   BP Diastolic 70 mmHg   Height 69 inches   Weight 220 lbs   Pulse Rate 64 bpm   BSA (Body Surface Area) 2.2 cc/m2   BMI (Body Mass Index) 32.5 cc/m2   Blood Pressure 112 / 70 mmHg     PHYSICAL EXAMINATION    PHYSICAL EXAMINATION  Header Details   Constitutional 94%O2   Vitals Right Arm Sitting  / 70 mmHg, Pulse rate 64 bpm, Height in 5' 9\", BMI: 32.5, Weight in 220.46 lbs (or) 100 kgs, BSA : 2.24 cc/m²   General Appearance No Acute Distress, Well groomed   Head/Eyes/Ears/Nose/Mouth/Throat Sclera Clear, Mucous membranes Moist   Neck Normal carotid pulsations, No carotid bruits, No JVD   Respiratory Unlabored, Lungs clear with normal breath sounds, Equal bilaterally   Cardiovascular    Gait Normal gait   Upper Extremities No clubbing, No cyanosis, No edema   Skin Warm and dry   Neurologic / Psychiatric Alert and Oriented, Non-focal   Speech Normal speech     ALLERGIES, ADVERSE REACTIONS, ALERTS    ALLERGIES, ADVERSE REACTIONS, ALERTS  Type Substance Reaction Severity Status   Intolerant demelverine - Ingredient upset Severe Active   Allergies DemeroL - Drug Class, [RxNorm: 762442] unknown Unknown Active   Allergies Penicillin G, [RxNorm: 871358]   Mild Active     MEDICATIONS ADMINISTERED DURING VISIT    No data available    MEDICATIONS    MEDICATIONS  Medication Start Date  Route/Frequency Status   atorvastatin (LIPITOR) 20 MG tablet, [RxNorm: 363876] 8/4/2021 Take 20 mg by mouth daily. Active   cholecalciferol (VITAMIN D3) 1000 units tablet, [RxNorm: 368738] 8/4/2021 Take 25 mcg by mouth daily. Active   Eliquis 5 Mg Tab B-Ms, [RxNorm: 5783224] - TAKE ONE TABLET BY MOUTH TWICE DAILY Active   finasteride (PROSCAR) 5 MG tablet, [RxNorm: 015453] 8/4/2021 Take 5 mg by mouth daily. Active   furosemide 40 mg tablet, [RxNorm: 750044] 11/9/2023 Take 1 tablet orally once a day. Active   latanoprost (XALATAN) 0.005 % ophthalmic solution, [RxNorm: 321603] 8/4/2021 INSTILL 1 DROP INTO BOTH EYES NIGHTLY Active   Sotalol Hcl 160 Mg Tab Teva, [RxNorm: 0862057] - TAKE ONE TABLET BY MOUTH ONE TIME DAILY Active   tamsulosin (FLOMAX) 0.4 MG Cap, [RxNorm: 949991] 8/4/2021 Take 0.4 mg by mouth daily after a meal. Active     ASSESSMENT    Coronary artery disease  -Stress test 8/2/22: Small fixed mild intensity apical inferolateral wall defect, no ischemia  -Echo 8/24/22: EF 53%, mild MR, PAP 52 mmHg  -Coronary CTA 11/2023: left main 20% stenosis, LAD/Diag bifurcation calcified plaque with normal FFR, RCA plaque with normal FFR  -No aspirin since he is on Eliquis  -Echocardiogram in 1 year  -Continue with aggressive cardiac risk factor modificationPersistent atrial flutter  Persistent atrial fibrillation  -S/p ablation  -Continue sotalol  -ECG q 3 months  -Blood work q 6 monthsSSS  Pacemaker in situ  -Device interrogations personally reviewed:  Normal [facility] dual-chamber pacemaker function  Sensing, impedance and thresholds reviewed Stable  Presenting rhythm reviewed AP/  No AF  AP 95%,  100%, dependent  Battery: stable  -Follow-up with the [facility] with remote and in-office checks as scheduled.Hypertension  -BP at goal  -Continue current medicationsHypercholesterolemia  -Continue atorvastatin  -Semiannual blood work to monitorLong term use of anticoagulant  -No bleeding problems  -Continue  Eliquis  -Semiannual blood work to monitor     FAMILY HISTORY    FAMILY HISTORY  Relationship Age Diagnosis   Father 0 No history of Family history of heart failure - FHx   Mother 0 No history of Family history of heart failure - FHx     GENERAL STATUS    No data available    PAST MEDICAL HISTORY    PAST MEDICAL HISTORY  Problem Date diagonsed Date resolved Status   CAD native (coronary artery disease), [SNOMED-CT: 58616382] 7/26/2022 - Active   Abnormal nuclear stress test, [SNOMED-CT: 914306859] 11/9/2023 - Active   Abnormal computed tomography angiography of heart, [SNOMED-CT: 055200545] 11/11/2024 - Active   Persistent atrial fibrillation, [SNOMED-CT: 015071394] 8/5/2021 - Active   Atrial Flutter, unspecified, [SNOMED-CT: 5458489] 9/7/2021 - Active   Third degree atrioventricular block, [SNOMED-CT: 15689999] 6/27/2019 - Active   Essential hypertension, [SNOMED-CT: 66697063] 6/27/2019 - Active   Dyslipidemia, [SNOMED-CT: 435733605] 6/27/2019 - Active   Long term current use of antiarrhythmic drug, [SNOMED-CT: 409333585] 6/8/2023 - Active   Anticoagulation, long-term (current) use, [SNOMED-CT: 349821529] 9/14/2021 - Active     HISTORY OF PRESENT ILLNESS    The patient returns to follow-up the above conditions. He has lower extremity edema, mild exertional dyspnea and fatigue. He has no chest pain, orthopnea, PND, palpitations, lightheadedness, or syncope.     IMMUNIZATIONS    No data available    PLAN OF CARE    PLAN OF CARE  Planned Care Date   EKG (electrocardiogram) 1/1/1900   Referral Visit - Honorio Flores [email or web address]) : 1/1/1900     PROCEDURES    No data available    RESULTS    RESULTS  Name Result Date Location - Ordered By   WBC [LOINC: 6690-2] 4.5 x10(3) uL 04/22/2024 09:06:00 AM [facility] LAB ([facility])  Address: [address] [place] [address]  [address]  tel:   RED BLOOD COUNT [LOINC: 789-8] 3.66 x10(6)uL [Low] 04/22/2024 09:06:00 AM [facility] LAB  [facility])  Address: [address]  [facility]  [address]  tel:   HGB [LOINC: 718-7] 10.3 g/dL [Low] 04/22/2024 09:06:00 AM [facility] LAB ([facility])  Address: [address]  [facility]  [address]  tel:   HCT [LOINC: 4544-3] 32.7 % [Low] 04/22/2024 09:06:00 AM [facility] LAB ([facility])  Address: [address]  [facility]  [address]  tel:   MEAN CELL VOLUME [LOINC: 787-2] 89.3 fL 04/22/2024 09:06:00 AM [facility] LAB ([facility])  Address: [address]  [facility]  [address]  tel:   MEAN CORPUSCULAR HEMOGLOBIN [LOINC: 785-6] 28.1 pg 04/22/2024 09:06:00 AM [facility] LAB ([facility])  Address: [address]  [facility]  [address]  tel:   MEAN CORPUSCULAR HGB CONC [LOINC: 786-4] 31.5 g/dL 04/22/2024 09:06:00 AM [facility] LAB ([facility])  Address: [address]  [facility]  [address]  tel:   RED CELL DISTRIBUTION WIDTH-SD [LOINC: 46395-4] 47.2 fL [High] 04/22/2024 09:06:00 AM [facility] LAB ([facility])  Address: [address]  [facility]  [address]  tel:   RED CELL DISTRIBUTION WIDTH CV [LOINC: 788-0] 14.6 % 04/22/2024 09:06:00 AM [facility] LAB ([facility])  Address: [address]  [facility]  [address]  tel:   PLATELETS [LOINC: 777-3] 242.0 10(3)uL 04/22/2024 09:06:00 AM [facility] LAB ([facility])  Address: [address]  [facility]  [address]  tel:   NEUTROPHIL ABS PRELIM [LOINC: 751-8] 3.06 x10 (3) uL 04/22/2024 09:06:00 AM [facility] LAB ([facility])  Address: [address]  [facility]  [address]  tel:   NEUTROPHIL ABSOLUTE [LOINC: 751-8] 3.06 x10(3) uL 04/22/2024 09:06:00 AM [facility] LAB ([facility])  Address: [address]  [facility]  [address]  tel:   LYMPHOCYTE ABSOLUTE [LOINC: 731-0] 0.57 x10(3) uL [Low] 04/22/2024 09:06:00 [facility]  LAB ([facility])  Address: [address]  [place]  [place]  [address]  tel:   MONOCYTE ABSOLUTE [LOINC: 742-7] 0.67 x10(3) uL 04/22/2024 09:06:00 AM [facility] LAB ([facility])  Address: [address]  [place]  [place]  [address]  tel:   EOSINOPHIL ABSOLUTE [LOINC: 711-2] 0.13 x10(3) uL 04/22/2024 09:06:00 AM [facility] LAB ([facility])  Address: [address]  [place]  [place]  [address]  tel:   BASOPHIL ABSOLUTE [LOINC: 704-7] 0.05 x10(3) uL 04/22/2024 09:06:00 AM [facility] LAB ([facility])  Address: [address]  [facility]  [address]  tel:   IMMATURE GRANULOCYTE COUNT [LOINC: 66328-1] 0.01 x10(3) uL 04/22/2024 09:06:00 AM [facility] LAB ([facility])  Address: [address]  [place]  [place]  [address]  tel:   NEUTROPHIL % [LOINC: 770-8] 68.2 % 04/22/2024 09:06:00 AM [facility] LAB ([facility])  Address: [address]  [place]  [place]  [address]  tel:   LYMPHOCYTE % [LOINC: 736-9] 12.7 % 04/22/2024 09:06:00 AM [facility] LAB ([facility])  Address: [address]  [place]  [place]  [address]  tel:   MONOCYTE % [LOINC: 5905-5] 14.9 % 04/22/2024 09:06:00 AM [facility] LAB ([facility])  Address: [address]  [place]  [place]  [address]  tel:   EOSINOPHIL % [LOINC: 713-8] 2.9 % 04/22/2024 09:06:00 AM [facility] LAB ([facility])  Address: [address]  [facility]  [address]  tel:   BASOPHIL % [LOINC: 706-2] 1.1 % 04/22/2024 09:06:00 AM [facility] LAB ([facility])  Address: [address]  [facility]  [address]  tel:   IMMATURE GRANULOCYTE RATIO % [LOINC: 77489-4] 0.2 % 04/22/2024 09:06:00 AM [facility] LAB ([facility])  Address: [address]  [facility]  [address]  tel:   POCT CREATININE [LOINC: 51047-3] 1.20 mg/dL 12/26/2023 10:10:00 AM [facility] LAB ([facility])  Address: [address]  [facility]  [address]  tel:   E GFR CR [LOINC: 19765-4] 60 mL/min/1.73m2 12/26/2023 10:10:00 AM [facility] LAB ([facility])  Address: [address]  [facility]  [address]  tel:   Nuclear PET 1.Stress EKG is non-diagnostic secondary to paced ventricular rhythm.2.No chest pain.3.Rare PVC's.1.This is an abnormal perfusion study. 2.Small fixed perfusion abnormality of mild intensity in the apical inferolateral wall, probably artifactual.3.The left ventricular cavity is noted to be normal on the stress studies. The stress left ventricular ejection fraction was calculated to be 29% and left ventricular global function is severely reduced. The rest left ventricular cavity is noted to be normal. The rest left ventricular ejection fraction was calculated to be 30% and rest left ventricular global function is moderately reduced. 4.This scan is suggestive of low risk for future cardiovascular events. 5.The study quality is average. 8/2/2022 8:00:00 AM Honorio Urban MD   Trans Thoracic Echocardiogram 1.The left ventricle is normal in size, wall thickness, and global systolic function. The left ventricular ejection fraction is 57%. Left ventricular diastolic function is indeterminate. No regional wall motion abnormalities noted.2.The right ventricle is normal in size. Right ventricular systolic function is normal. 3.Mild (1+) mitral regurgitation. 4.The pulmonary artery systolic pressure is 59 mmHg. Moderate-severe pulmonary hypertension. 11/21/2023 12:30:00 PM Isaac Boyer MD     REVIEW OF SYSTEMS    REVIEW OF SYSTEMS  Header Details   Constitutional No history of Weight Loss, Anorexia   Eyes No history of Blurry vision, Visual changes, Double vision   ENT No history of Bloody nose (epistaxis), Gingival bleeding   Hem/Lymphatic No history of Easy bruising, Blood clots, Hx of blood transfusion, Anemia, Bleeding problems     SOCIAL HISTORY    SOCIAL HISTORY  Social History Element Description  Effective Dates   Smoking status Never smoked -     FUNCTIONAL STATUS    No data available    MEDICAL EQUIPMENT    No data available    Goals Sections    No data available    REASON FOR REFERRAL                   Health Concerns Section    No data available    COGNITIVE/MENTAL STATUS    No data available    Patient Demographics    Patient Demographics  Patient Address Patient Name Communication   [address]  [place] [address] Alberto Bray [phone] (Mobile)     Patient Demographics  Language Race / Ethnicity Marital Status   English - Spoken (Preferred) White / Not  or       Document Information    Primary Care Provider Other Service Providers Document Coverage Dates   Isaac Boyer  NPI: [id number]  [phone] (Work)  [address], [address]  [place] [address]  [place] [address]  [employer]  [facility]  [phone] (Work)  [address] [place] [address] Ratnapauly Fulton  NPI: [id number]  [phone] (Work)  [address], [address]  [place] [address]  [place] [address]  Nurses     Ana Carrasco  NPI: [id number]  [phone] (Work)  [address], [address]  [place] [address]  [place] [address]  Nurses Nov. 11, 2024November 11, 2024      Organization   [facility]  [phone] (Work)  [address], [address]  [place] [address]  [place] [address]     Encounter Providers Encounter Date    Nov. 11, 2024November 11, 2024     Legal Authenticator    Isaac Boyer  NPI: [id number]  [phone] (Work)  [address], [address]  [place] [address]  [place] [address]

## 2025-04-30 NOTE — CONSULTS
Cardiology Consult Note    Alberto Bray Patient Status:  Inpatient    1940 MRN [id number]   Location [facility] 3W/SW Attending Julien Platt MD   Hosp Day # 0 PCP Gian Canela MD     [facility].  Alberto Bray is a 85 year old male with a history of paroxysmal atrial fibrillation, pulm hypertension, heart block status post PPM, moderate coronary artery disease by CTA, CKD, hyperlipidemia presenting with shortness of breath.  Triage vitals pertinent for blood pressure 177/76, pulse 60, respiratory rate 28, SpO2 93% labs show creatinine 1.37 up from 1.2 last August, proBNP 2600, hemoglobin 8.7.  Patient was deemed to be volume overloaded, administered nebulizers and Lasix 40 mg IV push admitted for management of volume overload, HFpEF.    Prior cardiac workup  Coronary CTA 2023  LMCA: <20% stenosis  LAD: Proximal  calcified plaque involving first diagonal branch (CT FFR 0.85)  Circumflex: No significant disease  RCA: Scattered proximal plaque (CT FFR 0.94)      [facility] echocardiogram 2022  LVEF 63%  Normal left ventricular chamber size and systolic function  Mild MR  Moderate pulm hypertension, PASP 52 mmHg    Stress perfusion study 2022  Small fixed perfusion abnormality of mild intensity of the apical inferolateral wall  EF 30%      --------------------------------------------------------------------------------------------------------------------------------  ROS 10 systems reviewed, pertinent findings above.  ROS    History:  Past Medical History[1]  Past Surgical History[2]  Family History[3]   reports that he quit smoking about 27 years ago. His smoking use included cigarettes. He started smoking about 55 years ago. He has a 2.8 pack-year smoking history. He has never used smokeless tobacco. He reports current alcohol use. He reports that he does not use drugs.    Objective:   Temp: 96.9 °F (36.1 °C)  Pulse: 60  Resp: 22  BP: 137/59    Intake/Output:   No intake or output  data in the 24 hours ending 04/30/25 0904    Physical Exam:     General: Alert and oriented x 3, no acute distress  HEENT: Normocephalic, anicteric sclera, neck supple.  Neck: No JVD, carotids 2+, no bruits.  Cardiac: Regular rate and rhythm. S1, S2 normal. No murmur, pericardial rub, S3.  Lungs: Clear without wheezes, rales, rhonchi or dullness.  Normal excursions and effort.  Abdomen: Soft, non-tender. BS-present.  Extremities: Without clubbing, cyanosis or edema.  Peripheral pulses are 2+.  Neurologic: Non-focal  Skin: Warm and dry.       Assessment:    Shortness of breath  Multifactorial, bronchospasm and HFpEF  Nonobstructive multivessel coronary disease by CCTA  Heart block status post DC-PPM  Acute on chronic renal failure  Hyperlipidemia      Plan:  85-year-old male with above history presenting with shortness of breath and admitted for workup and management of heart failure and possibly secondary pulmonary etiology.  Continue sotalol, Eliquis  Continue IV Lasix  Follow-up with echocardiogram, if reduction in  LV function then we will need to consider alternate antiarrhythmic to sotalol    Thank you for allowing me to take part in the care of Alberto Kim Asa. Please call with any questions of concerns.    Level of care: C5    Dr. Ariadna Alfonso,   April 30, 2025  9:04 AM           [1]   Past Medical History:   Aortic atherosclerosis    CXR 5-14    ASHD (arteriosclerotic heart disease)    Cardiac CT angiography 9-15 with 40-60% mid LAD stenosis; Lexiscan GXT 8-22 with small fixed apical inferolateral perfusion defect likely artifactual, with EF 30%; CTA coronary arteries 12-23 with less than 20% LMT stenosis, diffuse calcified plaque RCA and LAD, with normal FFR    BPH (benign prostatic hyperplasia)    Chronic kidney disease, stage 3 (HCC)    Essential hypertension    Glaucoma    12/20/17 Started Latanoprost OS qhs due to OCT thinning OS 12/13/18 Start Latanprost qhs OD due to thinning  of RNFL OD     Hypercholesterolemia    Iron deficiency anemia    Left ventricular hypertrophy    Echo 7-18 with mild LVH, normal LV function, EF 55-60%, mild MR, severe LAE, mild TR with peak pulmonary artery pressure 38 mmHg    Paroxysmal atrial fibrillation (HCC)    Status post ablation 11-22    Pulmonary hypertension (HCC)    Echo 8-22 with normal LV function, EF 53%, mild LAE, mild MR, moderate pulmonary hypertension with PAP 52 mmHg    Second degree AV block    Status post pacemaker placement 5-14    Thrombocytopenia   [2]   Past Surgical History:  Procedure Laterality Date    Appendectomy  1968    Cardiac pacemaker placement  05/2014    Cataract extraction w/ intraocular lens implant Right 12/13/2023    Dr. Gar @ [facility]    Ep a-flutter ablation  11/04/2022         Ep cardioversion 1x  08/16/2021         Ep cardioversion 1x  09/22/2021         Ep generator change  11/04/2022         Ep pulmonary vein/a-fib ablation  11/04/2022         Tonsillectomy  Childhood   [3]   Family History  Problem Relation Age of Onset    Diabetes Father     Diabetes Paternal Aunt     Hypertension Mother     Other (CVA) Mother         Age 48    Colon Cancer Paternal Grandmother     Other (Lung Cancer) Paternal Grandfather     Macular degeneration Cousin     Glaucoma Neg

## 2025-04-30 NOTE — PROGRESS NOTES
Sotalol adjusted per P&T Protocol  Estimated Creatinine Clearance: 40.1 mL/min (A) (based on SCr of 1.37 mg/dL (H)).  Normal Dose Calculated CrCl:   For Ventricular Arrhythmias:  Every 12 hours CrCl 30-60:  Every 24 hours    CrCl 10-29:  Every 48 hours    CrCl < 10:  Call Prescriber   For Atrial Fibrillation: Every 12 hours CrCl 40-60:  Every 24 hours    CrCl < 40:  Contraindicated

## 2025-04-30 NOTE — PLAN OF CARE
Patient is from home alone. A&Ox4. Currently on 2L via NC. Patient does not use home O2. Patient denies pain at this time. Bed in lowest position and locked. Call light in hand. Personal belongings w/in reach.     Problem: Patient Centered Care  Goal: Patient preferences are identified and integrated in the patient's plan of care  Description: Interventions:- What would you like us to know as we care for you? From home alone- Provide timely, complete, and accurate information to patient/family- Incorporate patient and family knowledge, values, beliefs, and cultural backgrounds into the planning and delivery of care- Encourage patient/family to participate in care and decision-making at the level they choose- Honor patient and family perspectives and choices  Outcome: Progressing     Problem: Patient/Family Goals  Goal: Patient/Family Long Term Goal  Description: Patient's Long Term Goal: To feel better Interventions:- Continue medication administration as ordered- See additional Care Plan goals for specific interventions  Outcome: Progressing  Goal: Patient/Family Short Term Goal  Description: Patient's Short Term Goal: To be discharged.   Interventions: - Assist patient to all tests and procedures. - See additional Care Plan goals for specific interventions  Outcome: Progressing

## 2025-04-30 NOTE — H&P
[facility]  part of [facility]  HISTORY AND PHYSICAL       Alberto Bray Patient Status:  Emergency    1940  85 year old CSN [id number]   Location  Attending Chacha Douglas MD     PCP Gian Canela MD         DATE OF ADMISSION: 2025     CHIEF COMPLAINT: Shortness of breath    HISTORY OF PRESENT ILLNESS  This is a 85 year oldmale patient presenting complaining of shortness of breath.  Symptoms starting around 1 day prior to admission.  Patient notes that shortness of breath was worse with even minimal exertion.  Patient states shortness of breath is worse when lying flat.  Patient did note associated increased lower extremity swelling.  Upon further review, patient reports going on vacation this past weekend and having increased liquid intake.  Patient denies current cough with sputum production.  Patient denies subjective fevers or chills.  Patient otherwise denies current chest pain, abdominal pain, nausea or vomiting.    PAST MEDICAL HISTORY  Past Medical History[1]     PAST SURGICAL HISTORY  Past Surgical History[2]    ALLERGIES   Azithromycin dihydrate [azithromycin], Penicillin g, and Meperidine hcl  [demerol]    MEDICATIONS  Patient's Medications   New Prescriptions    No medications on file   Previous Medications    AMLODIPINE 5 MG ORAL TAB    Take 1 tablet (5 mg total) by mouth daily.    AMMONIUM LACTATE 12 % EXTERNAL LOTION    Apply topically as needed for Dry Skin.    ATORVASTATIN 20 MG ORAL TAB    Take 1 tablet (20 mg total) by mouth daily.    CHOLECALCIFEROL 25 MCG (1000 UT) ORAL TAB    Take 1 tablet (1,000 Units total) by mouth daily.    ELIQUIS 5 MG ORAL TAB    Take 1 tablet (5 mg total) by mouth 2 (two) times daily.    FINASTERIDE 5 MG ORAL TAB    Take 1 tablet (5 mg total) by mouth daily.    FUROSEMIDE 40 MG ORAL TAB    Take 1 tablet (40 mg total) by mouth daily.    LATANOPROST 0.005 % OPHTHALMIC SOLUTION    Place 1 drop into both eyes nightly     POTASSIUM CHLORIDE 20 MEQ ORAL TAB CR    Take 1 tablet (20 mEq total) by mouth daily.    SOTALOL  MG ORAL TAB    Take 1 tablet (160 mg total) by mouth daily.    TAMSULOSIN (FLOMAX) CAP    Take 1 capsule (0.4 mg total) by mouth daily.   Modified Medications    No medications on file   Discontinued Medications    No medications on file       SOCIAL HISTORY  Social Hx on file[3]    FAMILY HISTORY  Family History[4]    PHYSICAL EXAM  Vital signs:  /62   Pulse 60   Temp 98.3 °F (36.8 °C) (Oral)   Resp 23   SpO2 98%      GENERAL:  Awake and alert, in no acute distress.  HEART:  Paced rhythm.  Regular rate   LUNGS:  Air entry was decreased.  No increased work of breathing or wheezes   ABDOMEN: Soft and non-tender.    EXTREMITIES: + Bilateral extremity edema appreciated  PSYCHIATRIC: Normal mood      IMAGING    XR CHEST AP PORTABLE  (CPT=71045)  Result Date: 4/30/2025  CONCLUSION:   Borderline cardiomegaly with mild diffuse bilateral interstitial opacity which may reflect interstitial edema.    Dictated by (CST): Demian Santiago MD on 4/30/2025 at 7:17 AM     Finalized by (CST): Demian Santiago MD on 4/30/2025 at 7:19 AM             Data:  Recent Labs   Lab 04/30/25  0643   RBC 3.31*   HGB 8.7*   HCT 29.2*   MCV 88.2   MCH 26.3   MCHC 29.8*   RDW 14.5   NEPRELIM 3.89   WBC 5.4   .0     Recent Labs   Lab 04/30/25  0643   *   BUN 31*   CREATSERUM 1.37*   CA 9.1   ALB 4.3      K 4.6      CO2 26.0   ALKPHO 105   AST 26   ALT 16   BILT 0.9   TP 6.7       ASSESSMENT/PLAN    Acute on chronic heart failure with preserved ejection fraction   Acute respiratory failure with hypoxia  -Patient presenting with shortness of breath.  Found to be hypoxic requiring supplemental O2.  -Chest x-ray reviewed.  Noted bilateral interstitial opacities consistent with interstitial edema  - Last known EF 63%  - Starting diuresis with Lasix 40mg IV BID  - Check Echo  - Strict I&Os, Daily weights, Fluid  restriction  - Cardiology on consult, appreciate recs.     Paroxysmal Atrial Fibrillation  -Rates currently controlled  -Continue home regimen with sotalol  -Continue chronic anticoagulation with Eliquis  -Telemetry monitoring  -Cardiology on consult, appreciate further recs    HTN  - controlled  - CPM  - Monitor and adjust as needed    Hx of Heart block   -s/p PPM     CKD  III   - Close monitoring with diuresis as above  - Avoiding Nephrotoxic agents  - Cont to monitor    BPH  -Restart home regimen of tamsulosin and finasteride    Hyperlipidemia  -Statin      Plan of care discussed with patient and family at bedside.  Discussed with ED physician and RN. Decision made that pt needs hospitalization for further management/monitoring.      Julien Platt MD    This note was prepared using Dragon Medical voice recognition dictation software. As a result errors may occur. When identified these errors have been corrected. While every attempt is made to correct errors during dictation discrepancies may still exist         [1]   Past Medical History:   Aortic atherosclerosis    CXR 5-14    ASHD (arteriosclerotic heart disease)    Cardiac CT angiography 9-15 with 40-60% mid LAD stenosis; Lexiscan GXT 8-22 with small fixed apical inferolateral perfusion defect likely artifactual, with EF 30%; CTA coronary arteries 12-23 with less than 20% LMT stenosis, diffuse calcified plaque RCA and LAD, with normal FFR    BPH (benign prostatic hyperplasia)    Chronic kidney disease, stage 3 (HCC)    Essential hypertension    Glaucoma    12/20/17 Started Latanoprost OS qhs due to OCT thinning OS 12/13/18 Start Latanprost qhs OD due to thinning of RNFL OD     Hypercholesterolemia    Iron deficiency anemia    Left ventricular hypertrophy    Echo 7-18 with mild LVH, normal LV function, EF 55-60%, mild MR, severe LAE, mild TR with peak pulmonary artery pressure 38 mmHg    Paroxysmal atrial fibrillation (HCC)    Status post ablation 11-22     Pulmonary hypertension (HCC)    Echo - with normal LV function, EF 53%, mild LAE, mild MR, moderate pulmonary hypertension with PAP 52 mmHg    Second degree AV block    Status post pacemaker placement 5-14    Thrombocytopenia   [2]   Past Surgical History:  Procedure Laterality Date    Appendectomy  1968    Cardiac pacemaker placement  2014    Cataract extraction w/ intraocular lens implant Right 2023    Dr. Gar @ [facility]    Ep a-flutter ablation  2022         Ep cardioversion 1x  08/16/2021         Ep cardioversion 1x  09/22/2021         Ep generator change  2022         Ep pulmonary vein/a-fib ablation  2022         Tonsillectomy  Childhood   [3]   Social History  Socioeconomic History    Marital status:    Occupational History    Occupation: Retired dentist   Tobacco Use    Smoking status: Former     Current packs/day: 0.00     Average packs/day: 0.1 packs/day for 28.0 years (2.8 ttl pk-yrs)     Types: Cigarettes     Start date: 1970     Quit date: 1998     Years since quittin.3    Smokeless tobacco: Never   Vaping Use    Vaping status: Never Used   Substance and Sexual Activity    Alcohol use: Yes     Alcohol/week: 0.0 standard drinks of alcohol     Comment: 2 glasses of wine most days    Drug use: No   Other Topics Concern    Caffeine Concern Yes     Comment: 6cups    [4]   Family History  Problem Relation Age of Onset    Diabetes Father     Diabetes Paternal Aunt     Hypertension Mother     Other (CVA) Mother         Age 48    Colon Cancer Paternal Grandmother     Other (Lung Cancer) Paternal Grandfather     Macular degeneration Cousin     Glaucoma Neg

## 2025-05-01 LAB
ANION GAP SERPL CALC-SCNC: 7 MMOL/L (ref 0–18)
BASOPHILS # BLD AUTO: 0.03 X10(3) UL (ref 0–0.2)
BASOPHILS NFR BLD AUTO: 0.6 %
BUN BLD-MCNC: 36 MG/DL (ref 9–23)
BUN/CREAT SERPL: 26.5 (ref 10–20)
CALCIUM BLD-MCNC: 9.1 MG/DL (ref 8.7–10.4)
CHLORIDE SERPL-SCNC: 104 MMOL/L (ref 98–112)
CO2 SERPL-SCNC: 31 MMOL/L (ref 21–32)
CREAT BLD-MCNC: 1.36 MG/DL (ref 0.7–1.3)
DEPRECATED RDW RBC AUTO: 48.4 FL (ref 35.1–46.3)
EGFRCR SERPLBLD CKD-EPI 2021: 51 ML/MIN/1.73M2 (ref 60–?)
EOSINOPHIL # BLD AUTO: 0.17 X10(3) UL (ref 0–0.7)
EOSINOPHIL NFR BLD AUTO: 3.2 %
ERYTHROCYTE [DISTWIDTH] IN BLOOD BY AUTOMATED COUNT: 14.6 % (ref 11–15)
GLUCOSE BLD-MCNC: 108 MG/DL (ref 70–99)
HCT VFR BLD AUTO: 27 % (ref 39–53)
HGB BLD-MCNC: 8 G/DL (ref 13–17.5)
IMM GRANULOCYTES # BLD AUTO: 0.02 X10(3) UL (ref 0–1)
IMM GRANULOCYTES NFR BLD: 0.4 %
LYMPHOCYTES # BLD AUTO: 0.38 X10(3) UL (ref 1–4)
LYMPHOCYTES NFR BLD AUTO: 7.1 %
MAGNESIUM SERPL-MCNC: 2 MG/DL (ref 1.6–2.6)
MCH RBC QN AUTO: 26.8 PG (ref 26–34)
MCHC RBC AUTO-ENTMCNC: 29.6 G/DL (ref 31–37)
MCV RBC AUTO: 90.6 FL (ref 80–100)
MONOCYTES # BLD AUTO: 0.74 X10(3) UL (ref 0.1–1)
MONOCYTES NFR BLD AUTO: 13.9 %
NEUTROPHILS # BLD AUTO: 3.99 X10 (3) UL (ref 1.5–7.7)
NEUTROPHILS # BLD AUTO: 3.99 X10(3) UL (ref 1.5–7.7)
NEUTROPHILS NFR BLD AUTO: 74.8 %
OSMOLALITY SERPL CALC.SUM OF ELEC: 303 MOSM/KG (ref 275–295)
PLATELET # BLD AUTO: 250 10(3)UL (ref 150–450)
POTASSIUM SERPL-SCNC: 4.6 MMOL/L (ref 3.5–5.1)
RBC # BLD AUTO: 2.98 X10(6)UL (ref 3.8–5.8)
SODIUM SERPL-SCNC: 142 MMOL/L (ref 136–145)
WBC # BLD AUTO: 5.3 X10(3) UL (ref 4–11)

## 2025-05-01 PROCEDURE — 99233 SBSQ HOSP IP/OBS HIGH 50: CPT | Performed by: INTERNAL MEDICINE

## 2025-05-01 RX ORDER — FINASTERIDE 5 MG/1
5 TABLET, FILM COATED ORAL EVERY EVENING
Status: DISCONTINUED | OUTPATIENT
Start: 2025-05-01 | End: 2025-05-02

## 2025-05-01 RX ORDER — TAMSULOSIN HYDROCHLORIDE 0.4 MG/1
0.4 CAPSULE ORAL EVERY EVENING
Status: DISCONTINUED | OUTPATIENT
Start: 2025-05-01 | End: 2025-05-02

## 2025-05-01 NOTE — PHYSICAL THERAPY NOTE
PHYSICAL THERAPY EVALUATION - INPATIENT     Room Number: 311/311-A  Evaluation Date: 5/1/2025  Type of Evaluation: Initial   Physician Order: PT Eval and Treat    Presenting Problem: SOB with wheezing, elevated BNP and pulmonary edema noted, CHF exacerbation  Co-Morbidities : a-fib on eliquis, HTN, second degree heart block s/p PPM, CKD  Reason for Therapy: Mobility Dysfunction and Discharge Planning    PHYSICAL THERAPY ASSESSMENT   Patient is a 85 year old male admitted 4/30/2025 for SOB, wheezing and elevated BNP, pulmonary edema, CHF exacerbation.  Prior to admission, patient's baseline is independent without a device at home, cane in community. He lives in a two story home alone, has a stair lift inside and no steps to enter. He drives and manages his home, sons help with shopping at times.  Patient is currently functioning near baseline with transfers, gait, standing prolonged periods, and performing household tasks.  Patient is requiring supervision and minimal assist as a result of the following impairments: decreased endurance/aerobic capacity, decreased muscular endurance, and medical status.  Physical Therapy will continue to follow for duration of hospitalization.    Patient will benefit from continued skilled PT Services at discharge to promote prior level of function and safety with additional support and return home with OP PT.    PLAN DURING HOSPITALIZATION  Nursing Mobility Recommendation : 1 Assist  PT Device Recommendation: Cane  PT Treatment Plan: Endurance, Energy conservation, Patient education, Gait training  Rehab Potential : Good  Frequency (Obs): 3x/week     PHYSICAL THERAPY MEDICAL/SOCIAL HISTORY   History related to current admission: This is a 85 year oldmale patient presenting complaining of shortness of breath.  Symptoms starting around 1 day prior to admission.  Patient notes that shortness of breath was worse with even minimal exertion.  Patient states shortness of breath is worse  when lying flat.  Patient did note associated increased lower extremity swelling.  Upon further review, patient reports going on vacation this past weekend and having increased liquid intake.  Patient denies current cough with sputum production.  Patient denies subjective fevers or chills.  Patient otherwise denies current chest pain, abdominal pain, nausea or vomiting.      Problem List  Principal Problem:    Heart failure, unspecified HF chronicity, unspecified heart failure type (HCC)  Active Problems:    Anemia, unspecified type    Acute hypoxemic respiratory failure (HCC)      HOME SITUATION  Type of Home: House  Home Layout: Two level, Stairlift, Other (Comment) (goes in through garage with NO steps)  Stairs to Enter : 0        Stairs to Bedroom: 0         Lives With: Alone, Other (Comment) (2 sons live in area and check in regularly)    Drives: Yes   Patient Regularly Uses: Glasses, Cane (no device at home, cane in community;)     Prior Level of [place]: Patient lives in a two story home alone. He manages all day to day tasks, no device in home. He uses a cane in the community and is still driving.    SUBJECTIVE  \"I am feeling a lot better today\"    PHYSICAL THERAPY EXAMINATION   OBJECTIVE  Precautions: Cardiac  Fall Risk: Standard fall risk    WEIGHT BEARING RESTRICTION       PAIN ASSESSMENT  Ratin  Location: no complaints  Management Techniques: Activity promotion    COGNITION  Overall Cognitive Status:  WFL - within functional limits    RANGE OF MOTION AND STRENGTH ASSESSMENT  Upper extremity ROM and strength are within functional limits   Lower extremity ROM is within functional limits   Lower extremity strength is within functional limits     BALANCE  Static Sitting: Normal  Dynamic Sitting: Good  Static Standing: Good  Dynamic Standing: Fair +    ACTIVITY TOLERANCE  Pulse: 86  Heart Rate Source: Monitor     BP: 118/66  BP Location: Right arm  BP Method: Automatic  Patient Position:  Standing    O2 WALK  Oxygen Therapy  SPO2% on Room Air at Rest: 96  SPO2% Ambulation on Room Air: 94    AM-PAC '6-Clicks' INPATIENT SHORT FORM - BASIC MOBILITY  How much difficulty does the patient currently have...  Patient Difficulty: Turning over in bed (including adjusting bedclothes, sheets and blankets)?: None   Patient Difficulty: Sitting down on and standing up from a chair with arms (e.g., wheelchair, bedside commode, etc.): None   Patient Difficulty: Moving from lying on back to sitting on the side of the bed?: None   How much help from another person does the patient currently need...   Help from Another: Moving to and from a bed to a chair (including a wheelchair)?: None   Help from Another: Need to walk in hospital room?: A Little   Help from Another: Climbing 3-5 steps with a railing?: A Little     AM-PAC Score:  Raw Score: 22   Approx Degree of Impairment: 20.91%   Standardized Score (AM-PAC Scale): 53.28   CMS Modifier (G-Code): CJ    FUNCTIONAL ABILITY STATUS  Functional Mobility/Gait Assessment  Gait Assistance: Supervision  Distance (ft): 50'x6 with standing rest breaks  Assistive Device: Cane  Pattern: Shuffle  Rolling: independent  Supine to Sit: independent  Sit to Supine: independent  Sit to Stand: independent    Exercise/Education Provided:  Energy conservation  Functional activity tolerated  Gait training  Posture  Strengthening  Transfer training    Skilled Therapy Provided: RN approves participation in therapy session. He presents sitting up in chair and agreeable to therapy. He is feeling much better. Education regarding pacing, posture, breathing techniques to utilize during mobility. He is using cane for hallway distances and needs standing rest breaks every 50' or so. He is encouraged to be up walking with RN staff throughout the day as well as LE exs and deep breathing. He has the skills to return home at this time, may benefit from OP PT vs cardiac rehab and he will think about this.  RN aware.    The patient's Approx Degree of Impairment: 20.91% has been calculated based on documentation in the [facility] '6 clicks' Inpatient Basic Mobility Short Form.  Research supports that patients with this level of impairment may benefit from home with no needs, may benefit from OP PT or cardiac rehab. Final disposition will be made by interdisciplinary medical team.    Patient End of Session: Up in chair, Needs met, Call light within reach, RN aware of session/findings, All patient questions and concerns addressed, Hospital anti-slip socks, Discussed recommendations with /    CURRENT GOALS  Goals to be met by: 5/15/25  Patient Goal Patient's self-stated goal is: to go home tomorrow   Goal #1      Goal #1   Current Status    Goal #2      Goal #2  Current Status    Goal #3 Patient is able to ambulate 150 feet with assist device: cane - straight at assistance level: modified independent   Goal #3   Current Status    Goal #4    Goal #4   Current Status    Goal #5 Patient to demonstrate independence with home activity/exercise instructions provided to patient in preparation for discharge.   Goal #5   Current Status    Goal #6    Goal #6  Current Status      Patient Evaluation Complexity Level:  History High - 3 or more personal factors and/or co-morbidities   Examination of body systems Moderate - addressing a total of 3 or more elements   Clinical Presentation  Moderate - Evolving   Clinical Decision Making  Moderate Complexity     Gait Trainin minutes  Therapeutic Exercise: 12 minutes

## 2025-05-01 NOTE — DISCHARGE INSTRUCTIONS
Going Home Instructions  In this section you will find the tools which will guide you through the first few days after you leave the hospital. Continued use of these tools will help you develop the skills necessary to keep your heart failure under control.     Home Care Instructions Following Heart Failure - the most important things to do every day include:   Weigh yourself and review the “Self-Check Plan” sheet every morning.   Call your cardiologist office if you are in the “Pay Attention-Use Caution” (yellow zone) or “Medical Alert-Warning!” (red zone) as outlined in the Self-Check Plan sheet.  Take your medicines as prescribed.  Limit your sodium (salt) intake.  Know when to call your cardiologist, primary doctor, or nurse.  Know when to seek emergency care.      Things for You to Remember:   1. See your doctor or healthcare provider as written on your discharge instructions.  It is important that you attend this appointment to make sure your symptoms are under control.     2. Your recommended sodium intake is 2555-3131 mg daily.    3.  Weigh yourself every day.    4. Some exercise and activity is important to help keep your heart functioning and strong. Unless instructed not to exercise, you may walk at a slow to moderate pace for 10-15 minutes 2-3 days per week to start. Pace your activity to prevent shortness of breath or fatigue. Stop exercising if you develop chest pain, lightheadedness, or significant shortness of breath.       Call Your Cardiologist If:   You gain 2-3 pounds in one day or 5 pounds in one week.  You have more difficulty breathing.  You are getting more tired with normal activity.  You are more short of breath lying down, or awaken at night short of breath.  You have swelling of your feet or legs.  You urinate less often during the day and more often at night.  You have cramps in your legs.  You have blurred vision or see yellowish-green halos around objects of lights.    Go to the  Emergency Room If:   You have pain or tightness in your chest  You are extremely short of breath  You are coughing up pink-frothy mucus  You are traveling and develop symptoms of worsening heart failure      ** Please follow up with your cardiologist or Advanced Practice Provider as written on your discharge instructions. If you are not provided with an appointment, let your nurse know so you can get an appointment**

## 2025-05-01 NOTE — OCCUPATIONAL THERAPY NOTE
OCCUPATIONAL THERAPY EVALUATION - INPATIENT     Room Number: 311/311-A  Evaluation Date: 5/1/2025  Type of Evaluation: Initial   Presenting problem: SOB; heart failure   Co-morbidities: paroxysmal atrial fibrillation, pulm hypertension, heart block status post PPM, moderate coronary artery disease by CTA, CKD, hyperlipidemia     Physician Order: IP Consult to Occupational Therapy  Reason for Therapy: ADL/IADL Dysfunction and Discharge Planning    OCCUPATIONAL THERAPY ASSESSMENT   Patient is a 85 year old male admitted 4/30/2025 for SOB; heart failure.  Prior to admission, patient's baseline is independent with ADLs, IADLs and mod I for mobility using cane for community distances.  Patient is currently functioning near baseline with lower body dressing, grooming, transfers, and functional standing tolerance.  Patient is requiring supervision, stand-by assist, and contact guard assist as a result of the following impairments: decreased functional strength, decreased endurance, and medical status. Occupational Therapy will continue to follow for duration of hospitalization.    Patient will benefit from continued skilled OT Services with no additional skilled services but increased support at home.    PLAN DURING HOSPITALIZATION  OT Device Recommendations: None  OT Treatment Plan: Balance activities, Energy conservation/work simplification techniques, ADL training, Functional transfer training, Endurance training, Compensatory technique education     OCCUPATIONAL THERAPY MEDICAL/SOCIAL HISTORY   Problem List  Principal Problem:    Heart failure, unspecified HF chronicity, unspecified heart failure type (HCC)  Active Problems:    Anemia, unspecified type    Acute hypoxemic respiratory failure (HCC)    HOME SITUATION  Type of Home: House  Home Layout: Two level; Stairlift  Lives With: Alone  Toilet and Equipment: Toilet riser  Shower/Tub and Equipment: Tub-shower combo  Other Equipment: None  Drives: Yes  Patient  Regularly Uses: Glasses; Cane (cane only for community distances)    Prior Level of [place]: Prior to admission pt was independent with ADLs and IADLs. Pt reports being mod I for mobility using cane for community distances; no device in the house.     SUBJECTIVE  \"My granddaughter is in college now but she wants to go to grad school for physical therapy.\"     OCCUPATIONAL THERAPY EXAMINATION      OBJECTIVE       PAIN ASSESSMENT  Ratin      ACTIVITY TOLERANCE  Pulse: 60  Heart Rate Source: Monitor     BP: 130/62  BP Location: Right arm  BP Method: Automatic  Patient Position: Sitting    O2 SATURATIONS  Oxygen Therapy  SPO2% on Room Air at Rest: 95    COGNITION  Overall Cognitive Status:  WFL - within functional limits  Safety Judgement:  good awareness of safety precautions    RANGE OF MOTION   Upper extremity ROM is within functional limits     STRENGTH ASSESSMENT  Upper extremity strength is within functional limits     COORDINATION  Gross Motor: WFL   Fine Motor: WFL     ACTIVITIES OF DAILY LIVING ASSESSMENT  AM-PAC ‘6-Clicks’ Inpatient Daily Activity Short Form  How much help from another person does the patient currently need…  -   Putting on and taking off regular lower body clothing?: A Little  -   Bathing (including washing, rinsing, drying)?: A Little  -   Toileting, which includes using toilet, bedpan or urinal? : None  -   Putting on and taking off regular upper body clothing?: None  -   Taking care of personal grooming such as brushing teeth?: None  -   Eating meals?: None    AM-PAC Score:  Score: 22  Approx Degree of Impairment: 25.8%  Standardized Score (AM-PAC Scale): 47.1  CMS Modifier (G-Code): CJ    FUNCTIONAL TRANSFER ASSESSMENT  Sit to Stand: Edge of Bed  Edge of Bed: Stand-by Assist  Toilet Transfer: Stand-by Assist    FUNCTIONAL MOBILITY  Pt required CGA-SBA with cane support to complete bathroom distance mobility to simulate household distances.   Comments: Pt demo no LOB, SOB, or  dizziness with activity.     BED MOBILITY  Supine to Sit : Stand-by Assist    BALANCE ASSESSMENT  Static Sitting: Independent  Static Standing: Stand-by Assist  Dynamic sitting: SUP  Dynamic Standing: CGA-SBA     FUNCTIONAL ADL ASSESSMENT  Eating: Independent  Grooming Standing: Stand-by Assist  LB Dressing Seated: Supervision (return demo figure four to manage LB clothing)    Skilled Therapy Provided:   RN cleared pt for participation. Pt received in bed with no visitors present. Pt agreeable to participation in therapy. To assess pt's participation during tasks while also providing intermittent education to maximize participation, OT facilitated the following: bed mobility, functional transfers, functional mobility, and ADL tasks. Pt tolerated treatment well and completed all tasks with assist levels listed above. Pt demo good safety awareness and appropriate dynamic standing balance to complete ADLs with SBA. Pt remained in recliner with all needs in reach and alarm on at end of session. RN aware.     EDUCATION PROVIDED  Patient Education : Role of Occupational Therapy; Plan of Care; Functional Transfer Techniques; Fall Prevention; Posture/Positioning; Energy Conservation; Proper Body Mechanics  Patient's Response to Education: Verbalized Understanding; Returned Demonstration    The patient's Approx Degree of Impairment: 25.8% has been calculated based on documentation in the [facility] '6 clicks' Inpatient Daily Activity Short Form.  Research supports that patients with this level of impairment may benefit from return home.  Final disposition will be made by interdisciplinary medical team.     Patient End of Session: Up in chair, Needs met, Call light within reach, RN aware of session/findings, All patient questions and concerns addressed, Hospital anti-slip socks, Alarm set    OT Goals  Patients self stated goal is: none stated      Patient will complete functional transfer with mod I   Comment:     Patient will  complete toileting with mod I   Comment:     Patient will tolerate standing for 5 minutes in prep for adls with mod I    Comment:    Patient will complete item retrieval with mod I   Comment:          Goals  on: 5/15/25  Frequency: 3-5x/week    Patient Evaluation Complexity Level:   Occupational Profile/Medical History MODERATE - Expanded review of history including review of medical or therapy record   Specific performance deficits impacting engagement in ADL/IADL MODERATE  3 - 5 performance deficits   Client Assessment/Performance Deficits MODERATE - Comorbidities and min to mod modifications of tasks    Clinical Decision Making MODERATE - Analysis of occupational profile, detailed assessments, several treatment options    Overall Complexity MODERATE     OT Session Time: 15 minutes  Self-Care Home Management: 15 minutes

## 2025-05-01 NOTE — PLAN OF CARE
Problem: Patient Centered Care  Goal: Patient preferences are identified and integrated in the patient's plan of care  Description: Interventions:- What would you like us to know as we care for you? Lives home with wife- Provide timely, complete, and accurate information to patient/family- Incorporate patient and family knowledge, values, beliefs, and cultural backgrounds into the planning and delivery of care- Encourage patient/family to participate in care and decision-making at the level they choose- Honor patient and family perspectives and choices  Outcome: Progressing     Problem: CARDIOVASCULAR - ADULT  Goal: Maintains optimal cardiac output and hemodynamic stability  Description: INTERVENTIONS:- Monitor vital signs, rhythm, and trends- Monitor for bleeding, hypotension and signs of decreased cardiac output- Evaluate effectiveness of vasoactive medications to optimize hemodynamic stability- Monitor arterial and/or venous puncture sites for bleeding and/or hematoma- Assess quality of pulses, skin color and temperature- Assess for signs of decreased coronary artery perfusion - ex. Angina- Evaluate fluid balance, assess for edema, trend weights  Outcome: Progressing  Goal: Absence of cardiac arrhythmias or at baseline  Description: INTERVENTIONS:- Continuous cardiac monitoring, monitor vital signs, obtain 12 lead EKG if indicated- Evaluate effectiveness of antiarrhythmic and heart rate control medications as ordered- Initiate emergency measures for life threatening arrhythmias- Monitor electrolytes and administer replacement therapy as ordered  Outcome: Progressing     Problem: RESPIRATORY - ADULT  Goal: Achieves optimal ventilation and oxygenation  Description: INTERVENTIONS:- Assess for changes in respiratory status- Assess for changes in mentation and behavior- Position to facilitate oxygenation and minimize respiratory effort- Oxygen supplementation based on oxygen saturation or ABGs- Provide Smoking  Cessation handout, if applicable- Encourage broncho-pulmonary hygiene including cough, deep breathe, Incentive Spirometry- Assess the need for suctioning and perform as needed- Assess and instruct to report SOB or any respiratory difficulty- Respiratory Therapy support as indicated- Manage/alleviate anxiety- Monitor for signs/symptoms of CO2 retention  Outcome: Progressing

## 2025-05-01 NOTE — PROGRESS NOTES
Progress Note  Alberto Bray Patient Status:  Inpatient    1940 MRN [id number]   Location [facility] 3W/SW Attending Julien Platt MD   Hosp Day # 1 PCP Gian Canela MD     Subjective:  Denies cardiac complaints, wants to go home    Objective:  /62 (BP Location: Right arm)   Pulse 60   Temp 98.6 °F (37 °C) (Oral)   Resp 18   Wt 206 lb (93.4 kg)   SpO2 100%   BMI 30.42 kg/m²     Telemetry: AV paced    Intake/Output:    Intake/Output Summary (Last 24 hours) at 2025 1421  Last data filed at 2025 0950  Gross per 24 hour   Intake 518 ml   Output 2100 ml   Net -1582 ml     Last 3 Weights   25 0537 206 lb (93.4 kg)   25 1107 210 lb 9.6 oz (95.5 kg)   25 0850 210 lb 9.6 oz (95.5 kg)   25 1107 206 lb (93.4 kg)   24 0940 205 lb 9.6 oz (93.3 kg)     Labs:  Recent Labs   Lab 25  0643 25  0738   * 108*   BUN 31* 36*   CREATSERUM 1.37* 1.36*   EGFRCR 51* 51*   CA 9.1 9.1    142   K 4.6 4.6    104   CO2 26.0 31.0     Recent Labs   Lab 25  0643 25  0738   RBC 3.31* 2.98*   HGB 8.7* 8.0*   HCT 29.2* 27.0*   MCV 88.2 90.6   MCH 26.3 26.8   MCHC 29.8* 29.6*   RDW 14.5 14.6   NEPRELIM 3.89 3.99   WBC 5.4 5.3   .0 250.0     Recent Labs   Lab 25  0643   TROPHS 12     Lab Results   Component Value Date/Time     (H) 2024 10:20 AM    LDL 76 2024 10:20 AM    TRIG 62 2024 10:20 AM     No results found for: \"DDIMER\"  Lab Results   Component Value Date    TSH 1.656 2024     Review of Systems:   Constitutional: No fevers, chills, fatigue or night sweats.  Respiratory: Denies cough, wheezing or shortness of breath.  CV: Denies chest pain, palpitations, orthopnea, PND or dizziness.  GI: No nausea, vomiting or diarrhea. No blood in stools.    Physical Exam:   General: Alert, cooperative, no distress, appears stated age.  Neck: no JVD.  Lungs: Clear to auscultation  bilaterally.  Chest wall: No tenderness or deformity.  Heart: Regular rate and rhythm, S1, S2 normal, no murmur, click, rub or gallop.  Abdomen: Soft, non-tender. Bowel sounds normal. No masses,  No organomegaly.  Extremities: Extremities normal, atraumatic, no cyanosis or edema.  Pulses: 2+ and symmetric all extremities.  Neurologic: Grossly intact.    Medications:  Scheduled Medications[1]  Medication Infusions[2]    Assessment:    85 year old male with a history of paroxysmal atrial fibrillation, pulm hypertension, heart block status post PPM, moderate coronary artery disease by CTA, CKD, hyperlipidemia presenting with shortness of breath.     Acute on chronic HFpEF  Admitted with shortness of breath  -proBNP 2,693 on admission  -LVEF 50-55% with mild mitral regurgitation  -diuresing with IV lasix BID with good response  -I/Os net neg 2.3L, weight down 4#, Cr stable  -GDMT: lasix, sotalol    Paroxysmal atrial fibrillation  AV paced on tele  -maintaining sinus with sotalol  -LVEF preserved as above  -OAC with eliquis 5mg BID  -CHADS-VASc= 5 for age, htn, hf, cad    CAD  Non-obstructive on CCTA  -statin, BB    Complete heart block s/p PPM  AV paced on tele    HLD-statin    Pulmonary HTN    CKD-Cr slightly elevated on admission, stable with diuresis      Plan:  -continue IV lasix for one more day, likely transition to PO tomorrow  -continue eliquis, sotalol, statin  -possible dc home tomorrow if stable      Plan of care discussed with patient, RN.        Alexa Archuleta, MSN, APN, FNP-BC, CCK  5/1/2025  2:21 PM  [phone] [place]  [phone] [place]           [1]    finasteride  5 mg Oral QPM    tamsulosin  0.4 mg Oral QPM    atorvastatin  20 mg Oral Daily    apixaban  5 mg Oral BID    latanoprost  1 drop Both Eyes Nightly    Sotalol HCl  160 mg Oral Daily    furosemide  40 mg Intravenous BID (Diuretic)   [2]

## 2025-05-01 NOTE — PROGRESS NOTES
Heart Failure Nurse  Progress Note    Patient was evaluated by the Heart Failure Nurse  for understanding, verbalization, demonstration, and recall of education related to heart failure, overall adherence to the behaviors necessary to maintain a compensated status, and risk for readmission.     Patient assessment: Patient alert and oriented, lives alone and independent in all activities. Patient does have two sons that live in the area. Patient said he was in [place] so did not take his lasix Friday, Saturday, or Sunday and believes this is the cause of his exacerbation. Patient was engaged in the conversation, admits he knows what he should do but does not always want to.     Patient is able to verbalize signs/symptoms fluid overload/impending HF exacerbation and who to contact with problems                                          _X__ yes  ___ no      Patient is following a 2000 mg sodium diet                                             __X_ yes  ___ no    If no, barriers to 2000 mg sodium diet:_______________________________________________    Patient informed of 2-Part dietician classes that is free if sign up within 30 days of discharge or $40  ___ yes  __X_ no      Patient is adherent to medication regimen                                              ___ yes  _X__ no    If no, barriers to medication regimen: Did not take his lasix due to being out of town.     Patient has sufficient funds to purchase medication                      _X__ yes  ___ no      Patient has a scale in the home              __X_ yes  ___ no      Patient is adherent to daily weight monitoring                                        ___ yes   _X__ no    If no, barriers to daily weight monitoring: Patient said he will weigh himself daily moving forward.     Symptom Tracker Worksheet reviewed with patient  _X__ yes   ___ no      Patient verbalizes understanding of stoplight/heart failure zones          _X__ yes   ___  no      Patient understands the importance of 7-day follow-up appointment      _X__ yes  ___ no    Appointment Date:          Patient has adequate transportation to attend follow-up appointments    __X_ yes  ___ no  Patient drives himself.     If no, was referral to Social Work made  ___yes  ___ no      Family/Friend present during education: Nilesh Dominguez    Additional consultations required: DOT Pike RN (signature)  Extension [phone]

## 2025-05-01 NOTE — PROGRESS NOTES
[facility]  part of [facility]     Hospitalist Progress Note     Alberto Bray Patient Status:  Inpatient    1940 MRN [id number]   Location [facility] 3W/SW Attending Julien Platt MD   Hosp Day # 1 PCP Gian Canela MD     Chief Complaint:   Chief Complaint   Patient presents with    Difficulty Breathing        Subjective:     Patient seen sitting in chair.  Family at bedside.  Patient denies acute events overnight.  Patient reports shortness of breath improving.  Patient states having good urine output.  Denies current chest pain, abdominal pain, nausea or vomiting.    Objective:      Vital signs:  Vitals:    25 0300 25 0537 25 0825 25 0901   BP:  124/49 132/54 130/62   BP Location:  Right arm Right arm Right arm   Pulse: 61 60 63 60   Resp:  18 18    Temp:   98.6 °F (37 °C)    TempSrc:   Oral    SpO2:  98% 100%    Weight:  206 lb (93.4 kg)         Intake/Output Summary (Last 24 hours) at 2025 1203  Last data filed at 2025 0950  Gross per 24 hour   Intake 518 ml   Output 2450 ml   Net -1932 ml           Physical Exam:    GENERAL:  Awake and alert, in no acute distress.  HEART:  Paced rhythm.  Regular rate   LUNGS:  Air entry was decreased, worse at bilateral bases.  No increased work of breathing or wheezes   ABDOMEN: Soft and non-tender.    EXTREMITIES: + Bilateral extremity edema improving from previous  PSYCHIATRIC: Normal mood    Diagnostic Data:    Labs:    Recent Labs   Lab 25  0643 25  0738   WBC 5.4 5.3   HGB 8.7* 8.0*   MCV 88.2 90.6   .0 250.0       Recent Labs   Lab 25  0643 25  0738   * 108*   BUN 31* 36*   CREATSERUM 1.37* 1.36*   CA 9.1 9.1   ALB 4.3  --     142   K 4.6 4.6    104   CO2 26.0 31.0   ALKPHO 105  --    AST 26  --    ALT 16  --    BILT 0.9  --    TP 6.7  --            Estimated Creatinine Clearance: 40.4 mL/min (A) (based on SCr of 1.36 mg/dL (H)).    No  results for input(s): \"PTP\", \"INR\" in the last 168 hours.         COVID-19  Lab Results   Component Value Date    COVID19 Not Detected 04/30/2025    COVID19 Not Detected 11/01/2022    COVID19 Not Detected 09/19/2021       Pro-Calcitonin  No results for input(s): \"PCT\" in the last 168 hours.    Cardiac  Recent Labs   Lab 04/30/25  0643   PBNP 2,693*       Inflammatory Markers  No results for input(s): \"CRP\", \"LIZZY\", \"LDH\", \"DDIMER\" in the last 168 hours.    Culture:  No results found for this visit on 04/30/25.    XR CHEST AP PORTABLE  (CPT=71045)  Result Date: 4/30/2025  CONCLUSION:   Borderline cardiomegaly with mild diffuse bilateral interstitial opacity which may reflect interstitial edema.    Dictated by (CST): Demian Santiago MD on 4/30/2025 at 7:17 AM     Finalized by (CST): Demian Santiago MD on 4/30/2025 at 7:19 AM            EKG 12 Lead  Result Date: 4/30/2025  AV dual-paced rhythm Abnormal ECG No previous ECGs found in [facility] Confirmed by ROOSEVELT INGRAM ([id number]) on 4/30/2025 2:41:16 PM      Medications: Scheduled Medications[1]    Assessment & Plan:       Acute on chronic heart failure with preserved ejection fraction   Acute respiratory failure with hypoxia  -Improving  -Patient presented with shortness of breath.  Found to be hypoxic requiring supplemental O2.  -Chest x-ray reviewed.  Noted bilateral interstitial opacities consistent with interstitial edema  - Last known EF 63%  - Continue diuresis with Lasix 40mg IV BID  - Echo reviewed  - Strict I&Os, Daily weights, Fluid restriction  - Cardiology on consult, appreciate recs.      Paroxysmal Atrial Fibrillation  -Rates currently controlled  -Continue sotalol  -Continue chronic anticoagulation with Eliquis  -Telemetry monitoring  -Cardiology on consult, appreciate further recs     HTN  - controlled  - CPM  - Monitor and adjust as needed     Hx of Heart block   -s/p PPM      CKD  III   - Close monitoring with diuresis as above  - Avoiding Nephrotoxic  agents  - Cont to monitor     BPH  - Continue tamsulosin and finasteride     Hyperlipidemia  -Statin         Plan of care discussed with patient and family at bedside . Discussed management/test result(s) with Rn and cardiology consultant    Quality:  DVT Prophylaxis: Eliquis  CODE status: Full  Estimated date of discharge: TBD  Discharge is dependent on: clinical stability    Julien Platt MD          This note was prepared using Dragon Medical voice recognition dictation software. As a result errors may occur. When identified these errors have been corrected. While every attempt is made to correct errors during dictation discrepancies may still exist         [1]    finasteride  5 mg Oral QPM    tamsulosin  0.4 mg Oral QPM    atorvastatin  20 mg Oral Daily    apixaban  5 mg Oral BID    latanoprost  1 drop Both Eyes Nightly    Sotalol HCl  160 mg Oral Daily    furosemide  40 mg Intravenous BID (Diuretic)

## 2025-05-01 NOTE — PLAN OF CARE
Problem: Patient Centered Care  Goal: Patient preferences are identified and integrated in the patient's plan of care  Description: Interventions:- What would you like us to know as we care for you? - Provide timely, complete, and accurate information to patient/family- Incorporate patient and family knowledge, values, beliefs, and cultural backgrounds into the planning and delivery of care- Encourage patient/family to participate in care and decision-making at the level they choose- Honor patient and family perspectives and choices  5/1/2025 0656 by David Childress RN  Outcome: Progressing  5/1/2025 0656 by David Childress RN  Outcome: Progressing     Problem: Patient/Family Goals  Goal: Patient/Family Long Term Goal  Description: Patient's Long Term Goal: Interventions:- - See additional Care Plan goals for specific interventions  5/1/2025 0656 by David Childress RN  Outcome: Progressing  5/1/2025 0656 by David Childress RN  Outcome: Progressing  Goal: Patient/Family Short Term Goal  Description: Patient's Short Term Goal: Interventions: - - See additional Care Plan goals for specific interventions  5/1/2025 0656 by David Childress RN  Outcome: Progressing  5/1/2025 0656 by David Childress RN  Outcome: Progressing     Problem: CARDIOVASCULAR - ADULT  Goal: Maintains optimal cardiac output and hemodynamic stability  Description: INTERVENTIONS:- Monitor vital signs, rhythm, and trends- Monitor for bleeding, hypotension and signs of decreased cardiac output- Evaluate effectiveness of vasoactive medications to optimize hemodynamic stability- Monitor arterial and/or venous puncture sites for bleeding and/or hematoma- Assess quality of pulses, skin color and temperature- Assess for signs of decreased coronary artery perfusion - ex. Angina- Evaluate fluid balance, assess for edema, trend weights  Outcome: Progressing  Goal: Absence of cardiac arrhythmias or at baseline  Description:  INTERVENTIONS:- Continuous cardiac monitoring, monitor vital signs, obtain 12 lead EKG if indicated- Evaluate effectiveness of antiarrhythmic and heart rate control medications as ordered- Initiate emergency measures for life threatening arrhythmias- Monitor electrolytes and administer replacement therapy as ordered  Outcome: Progressing     Problem: RESPIRATORY - ADULT  Goal: Achieves optimal ventilation and oxygenation  Description: INTERVENTIONS:- Assess for changes in respiratory status- Assess for changes in mentation and behavior- Position to facilitate oxygenation and minimize respiratory effort- Oxygen supplementation based on oxygen saturation or ABGs- Provide Smoking Cessation handout, if applicable- Encourage broncho-pulmonary hygiene including cough, deep breathe, Incentive Spirometry- Assess the need for suctioning and perform as needed- Assess and instruct to report SOB or any respiratory difficulty- Respiratory Therapy support as indicated- Manage/alleviate anxiety- Monitor for signs/symptoms of CO2 retention  Outcome: Progressing

## 2025-05-02 LAB
ANION GAP SERPL CALC-SCNC: 7 MMOL/L (ref 0–18)
BASOPHILS # BLD AUTO: 0.03 X10(3) UL (ref 0–0.2)
BASOPHILS NFR BLD AUTO: 0.7 %
BUN BLD-MCNC: 38 MG/DL (ref 9–23)
BUN/CREAT SERPL: 27.3 (ref 10–20)
CALCIUM BLD-MCNC: 9.1 MG/DL (ref 8.7–10.4)
CHLORIDE SERPL-SCNC: 101 MMOL/L (ref 98–112)
CO2 SERPL-SCNC: 32 MMOL/L (ref 21–32)
CREAT BLD-MCNC: 1.39 MG/DL (ref 0.7–1.3)
DEPRECATED RDW RBC AUTO: 47.5 FL (ref 35.1–46.3)
EGFRCR SERPLBLD CKD-EPI 2021: 50 ML/MIN/1.73M2 (ref 60–?)
EOSINOPHIL # BLD AUTO: 0.24 X10(3) UL (ref 0–0.7)
EOSINOPHIL NFR BLD AUTO: 5.4 %
ERYTHROCYTE [DISTWIDTH] IN BLOOD BY AUTOMATED COUNT: 14.6 % (ref 11–15)
GLUCOSE BLD-MCNC: 106 MG/DL (ref 70–99)
HCT VFR BLD AUTO: 26.5 % (ref 39–53)
HGB BLD-MCNC: 8 G/DL (ref 13–17.5)
IMM GRANULOCYTES # BLD AUTO: 0.01 X10(3) UL (ref 0–1)
IMM GRANULOCYTES NFR BLD: 0.2 %
LYMPHOCYTES # BLD AUTO: 0.43 X10(3) UL (ref 1–4)
LYMPHOCYTES NFR BLD AUTO: 9.7 %
MCH RBC QN AUTO: 26.7 PG (ref 26–34)
MCHC RBC AUTO-ENTMCNC: 30.2 G/DL (ref 31–37)
MCV RBC AUTO: 88.3 FL (ref 80–100)
MONOCYTES # BLD AUTO: 0.66 X10(3) UL (ref 0.1–1)
MONOCYTES NFR BLD AUTO: 14.9 %
NEUTROPHILS # BLD AUTO: 3.05 X10 (3) UL (ref 1.5–7.7)
NEUTROPHILS # BLD AUTO: 3.05 X10(3) UL (ref 1.5–7.7)
NEUTROPHILS NFR BLD AUTO: 69.1 %
NT-PROBNP SERPL-MCNC: 2396 PG/ML (ref ?–450)
OSMOLALITY SERPL CALC.SUM OF ELEC: 299 MOSM/KG (ref 275–295)
PLATELET # BLD AUTO: 260 10(3)UL (ref 150–450)
POTASSIUM SERPL-SCNC: 4 MMOL/L (ref 3.5–5.1)
RBC # BLD AUTO: 3 X10(6)UL (ref 3.8–5.8)
SODIUM SERPL-SCNC: 140 MMOL/L (ref 136–145)
WBC # BLD AUTO: 4.4 X10(3) UL (ref 4–11)

## 2025-05-02 PROCEDURE — 99239 HOSP IP/OBS DSCHRG MGMT >30: CPT | Performed by: INTERNAL MEDICINE

## 2025-05-02 RX ORDER — FUROSEMIDE 40 MG/1
40 TABLET ORAL 2 TIMES DAILY
Status: SHIPPED | COMMUNITY
Start: 2025-05-02

## 2025-05-02 RX ORDER — FUROSEMIDE 40 MG/1
40 TABLET ORAL
Status: DISCONTINUED | OUTPATIENT
Start: 2025-05-02 | End: 2025-05-02

## 2025-05-02 NOTE — CM/SW NOTE
Per chart, PT worked w/ pt yesterday and Anticipated therapy need: Home with Outpatient Rehab      MD to enter Order/Rx for Outpatient PT prior to pt's DC from [facility].      PLAN: Home w/ Outpatient PT - pending Rx & med clear      SW/CM to remain available for support and/or discharge planning.       Loretta MSW, LSW [id number]

## 2025-05-02 NOTE — PROGRESS NOTES
Progress Note  Alberto Bray Patient Status:  Inpatient    1940 MRN [id number]   Location [facility] 3W/SW Attending Julien Platt MD   Hosp Day # 2 PCP Gian Canela MD     Subjective:  Denies cardiac complaints, wants to go home, states his LE edema is \"back to the way it always is\"    Objective:  /61 (BP Location: Right arm)   Pulse 63   Temp 98.2 °F (36.8 °C) (Oral)   Resp 18   Wt 206 lb 9.6 oz (93.7 kg)   SpO2 96%   BMI 30.51 kg/m²     Telemetry: AV paced    Intake/Output:    Intake/Output Summary (Last 24 hours) at 2025 1035  Last data filed at 2025 0934  Gross per 24 hour   Intake 687 ml   Output 1750 ml   Net -1063 ml     Last 3 Weights   25 0513 206 lb 9.6 oz (93.7 kg)   25 0537 206 lb (93.4 kg)   25 1107 210 lb 9.6 oz (95.5 kg)   25 0850 210 lb 9.6 oz (95.5 kg)   25 1107 206 lb (93.4 kg)   24 0940 205 lb 9.6 oz (93.3 kg)     Labs:  Recent Labs   Lab 25  0643 25  0738 25  0639   * 108* 106*   BUN 31* 36* 38*   CREATSERUM 1.37* 1.36* 1.39*   EGFRCR 51* 51* 50*   CA 9.1 9.1 9.1    142 140   K 4.6 4.6 4.0    104 101   CO2 26.0 31.0 32.0     Recent Labs   Lab 25  0643 25  0738 25  0639   RBC 3.31* 2.98* 3.00*   HGB 8.7* 8.0* 8.0*   HCT 29.2* 27.0* 26.5*   MCV 88.2 90.6 88.3   MCH 26.3 26.8 26.7   MCHC 29.8* 29.6* 30.2*   RDW 14.5 14.6 14.6   NEPRELIM 3.89 3.99 3.05   WBC 5.4 5.3 4.4   .0 250.0 260.0     Recent Labs   Lab 25  0643   TROPHS 12     Lab Results   Component Value Date/Time     (H) 2024 10:20 AM    LDL 76 2024 10:20 AM    TRIG 62 2024 10:20 AM     No results found for: \"DDIMER\"  Lab Results   Component Value Date    TSH 1.656 2024     Review of Systems:   Constitutional: No fevers, chills, fatigue or night sweats.  Respiratory: Denies cough, wheezing or shortness of breath.  CV: Denies chest pain, palpitations,  orthopnea, PND or dizziness.  GI: No nausea, vomiting or diarrhea. No blood in stools.    Physical Exam:   General: Alert, cooperative, no distress, appears stated age.  Neck: no JVD.  Lungs: Clear to auscultation bilaterally.  Chest wall: No tenderness or deformity.  Heart: Regular rate and rhythm, S1, S2 normal, no murmur, click, rub or gallop.  Abdomen: Soft, non-tender. Bowel sounds normal. No masses,  No organomegaly.  Extremities: Extremities normal, atraumatic, no cyanosis, mild RLE edema, 1+ LLE edema, pt states this is baseline edema  Pulses: 2+ and symmetric all extremities.  Neurologic: Grossly intact.    Medications:  Scheduled Medications[1]  Medication Infusions[2]    Assessment:    85 year old male with a history of paroxysmal atrial fibrillation, pulm hypertension, heart block status post PPM, moderate coronary artery disease by CTA, CKD, hyperlipidemia presenting with shortness of breath.     Acute on chronic HFpEF  Admitted with shortness of breath  -proBNP 2,693 on admission  -LVEF 50-55% with mild mitral regurgitation  -diuresed with IV lasix BID with good response  -I/Os net neg 3L, weight down 4#, Cr stable  -GDMT: lasix, sotalol    Paroxysmal atrial fibrillation  AV paced on tele  -maintaining sinus with sotalol  -LVEF preserved as above  -OAC with eliquis 5mg BID  -CHADS-VASc= 5 for age, htn, hf, cad    CAD  Non-obstructive on CCTA  -statin, BB    Complete heart block s/p PPM  AV paced on tele    HLD-statin    Pulmonary HTN    CKD-Cr slightly elevated on admission, stable with diuresis      Plan:  -stop IV lasix, transition to PO lasix 40mg BID  -continue eliquis, sotalol, statin  -stable for discharge home from cardiac standpoint, will  need one week BMP and follow up with [facility] HF APN      Plan of care discussed with patient, RN.        Alexa Archuleta, MSN, APN, FNP-BC, CCK  05/02/25  10:36 AM  [phone] [place]  [phone]  [place]        =======================================================  Note reviewed and labs reviewed.  Agree with above assessment and plan.  Volume appears near euvolemia, pt reports at his baseline.   Transition to PO diuresis.   Needs close followup as OP with HF clinic.   I have personally performed the medical decision making in its entirety  Gian Arias DO           [1]    finasteride  5 mg Oral QPM    tamsulosin  0.4 mg Oral QPM    atorvastatin  20 mg Oral Daily    apixaban  5 mg Oral BID    latanoprost  1 drop Both Eyes Nightly    Sotalol HCl  160 mg Oral Daily    furosemide  40 mg Intravenous BID (Diuretic)   [2]

## 2025-05-02 NOTE — DISCHARGE PLANNING
Patient was provided with discharge instructions, education, and follow up information.  Patient verbalizes understanding of follow up information, specifically follow ups, when to take next dose of each medication, physical therapy outpatient, heart failure discharge instructions, signs and symptoms of when to call MD or EMS. Patient has no questions after reviewing all instructions and will be going home with his son.     Marisa SORIA, Discharge Leader [id number]

## 2025-05-02 NOTE — DISCHARGE SUMMARY
[facility]  part of [facility]    Discharge Summary    Alberto Bray Patient Status:  Inpatient    1940 MRN [id number]   Location [facility] 3W/SW Attending Julien Platt MD   Hosp Day # 2 PCP Gian Canela MD     Date of Admission: 2025     Date of Discharge: 25      Lace+ Score: 66  59-90 High Risk  29-58 Medium Risk  0-28   Low Risk.    TCM Follow-Up Recommendation:  LACE > 58: High Risk of readmission after discharge from the hospital.    DISCHARGE DX: Principal Problem:    Heart failure, unspecified HF chronicity, unspecified heart failure type (HCC)  Active Problems:    Anemia, unspecified type    Acute hypoxemic respiratory failure (HCC)       The patient was seen and examined on day of discharge and this discharge summary is in conjunction with any daily progress note from day of discharge.    HPI per admitting physician: \"This is a 85 year oldmale patient presenting complaining of shortness of breath.  Symptoms starting around 1 day prior to admission.  Patient notes that shortness of breath was worse with even minimal exertion.  Patient states shortness of breath is worse when lying flat.  Patient did note associated increased lower extremity swelling.  Upon further review, patient reports going on vacation this past weekend and having increased liquid intake.  Patient denies current cough with sputum production.  Patient denies subjective fevers or chills.  Patient otherwise denies current chest pain, abdominal pain, nausea or vomiting. \"    Hospital Course:      Acute on chronic heart failure with preserved ejection fraction   Acute respiratory failure with hypoxia  -Improving  -Patient presented with shortness of breath.  Found to be hypoxic requiring supplemental O2. Weaned to RA  -Chest x-ray reviewed.  Noted bilateral interstitial opacities consistent with interstitial edema  - Last known EF 63%  - Improved with diuresis with Lasix 40mg IV  BID, transitioned to PO  - Echo reviewed  - Daily weights, Fluid restriction  - Cardiology on consult, f/u as outpt     Paroxysmal Atrial Fibrillation  -Rates currently controlled  -Continue sotalol  -Continue chronic anticoagulation with Eliquis  -Cardiology on consult,  f/u as outpt     HTN  - controlled  - CPM  - PCP to monitor and adjust as needed     Hx of Heart block   -s/p PPM      CKD  III   - Close monitoring with diuresis as above  - Avoiding Nephrotoxic agents  - Cont to monitor     BPH  - Continue tamsulosin and finasteride     Hyperlipidemia  -Statin      Physical Exam:    Vitals:    05/02/25 0300 05/02/25 0513 05/02/25 0923 05/02/25 1116   BP:   126/61 112/59   BP Location:   Right arm Right arm   Pulse: 62  63 64   Resp:   18    Temp:   98.2 °F (36.8 °C)    TempSrc:   Oral    SpO2:   96% 98%   Weight:  206 lb 9.6 oz (93.7 kg)       Patient Weight for the past 72 hrs:   Weight   04/30/25 0850 210 lb 9.6 oz (95.5 kg)   04/30/25 1107 210 lb 9.6 oz (95.5 kg)   05/01/25 0537 206 lb (93.4 kg)   05/02/25 0513 206 lb 9.6 oz (93.7 kg)       Intake/Output Summary (Last 24 hours) at 5/2/2025 1138  Last data filed at 5/2/2025 1120  Gross per 24 hour   Intake 987 ml   Output 1750 ml   Net -763 ml       GENERAL:  Awake and alert, in no acute distress.  HEART:  Paced rhythm.  Regular rate   LUNGS:  Air entry was minimally decreased.  No increased work of breathing or wheezes   ABDOMEN: Soft and non-tender.    EXTREMITIES: + Bilateral extremity edema continues to improve from previous  PSYCHIATRIC: Normal mood    CULTURE:   No results found for this visit on 04/30/25.    IMAGING STUDIES: SOME MAY NEED FOLLOW UP WITH PCP   XR CHEST AP PORTABLE  (CPT=71045)  Result Date: 4/30/2025  CONCLUSION:   Borderline cardiomegaly with mild diffuse bilateral interstitial opacity which may reflect interstitial edema.    Dictated by (CST): Demian Santiago MD on 4/30/2025 at 7:17 AM     Finalized by (CST): Demian Santiago MD on  4/30/2025 at 7:19 AM            LABS :     Lab Results   Component Value Date    WBC 4.4 05/02/2025    HGB 8.0 (L) 05/02/2025    HCT 26.5 (L) 05/02/2025    .0 05/02/2025    CREATSERUM 1.39 (H) 05/02/2025    BUN 38 (H) 05/02/2025     05/02/2025    K 4.0 05/02/2025     05/02/2025    CO2 32.0 05/02/2025     (H) 05/02/2025    CA 9.1 05/02/2025    ALB 4.3 04/30/2025    ALKPHO 105 04/30/2025    BILT 0.9 04/30/2025    TP 6.7 04/30/2025    AST 26 04/30/2025    ALT 16 04/30/2025    T4F 1.22 09/11/2015    TSH 1.656 08/28/2024    PSA 2.8 12/21/2018    MG 2.0 05/01/2025    PHOS 3.0 09/28/2021    B12 249 06/11/2024       Recent Labs   Lab 04/30/25  0643 05/01/25  0738 05/02/25  0639   RBC 3.31* 2.98* 3.00*   HGB 8.7* 8.0* 8.0*   HCT 29.2* 27.0* 26.5*   MCV 88.2 90.6 88.3   MCH 26.3 26.8 26.7   MCHC 29.8* 29.6* 30.2*   RDW 14.5 14.6 14.6   NEPRELIM 3.89 3.99 3.05   WBC 5.4 5.3 4.4   .0 250.0 260.0     Recent Labs   Lab 04/30/25  0643 05/01/25  0738 05/02/25  0639   * 108* 106*   BUN 31* 36* 38*   CREATSERUM 1.37* 1.36* 1.39*   CA 9.1 9.1 9.1   ALB 4.3  --   --     142 140   K 4.6 4.6 4.0    104 101   CO2 26.0 31.0 32.0   ALKPHO 105  --   --    AST 26  --   --    ALT 16  --   --    BILT 0.9  --   --    TP 6.7  --   --      No results found for: \"PT\", \"INR\"    Disposition: Discharge to Home    Condition at Discharge: Stable     Discharge Medications:      Discharge Medications        CHANGE how you take these medications        Instructions Prescription details   furosemide 40 MG Tabs  Commonly known as: Lasix  What changed: when to take this      Take 1 tablet (40 mg total) by mouth 2 (two) times daily.   Refills: 0            CONTINUE taking these medications        Instructions Prescription details   amLODIPine 5 MG Tabs  Commonly known as: Norvasc      Take 1 tablet (5 mg total) by mouth daily.   Quantity: 90 tablet  Refills: 3     ammonium lactate 12 % Lotn  Commonly known  as: Lac-Hydrin      Apply topically as needed for Dry Skin.   Refills: 0     atorvastatin 20 MG Tabs  Commonly known as: Lipitor      Take 1 tablet (20 mg total) by mouth daily.   Quantity: 90 tablet  Refills: 1     cholecalciferol 25 MCG (1000 UT) Tabs  Commonly known as: Vitamin D3      Take 1 tablet (1,000 Units total) by mouth in the morning.   Refills: 0     Eliquis 5 MG Tabs  Generic drug: apixaban      Take 1 tablet (5 mg total) by mouth in the morning and 1 tablet (5 mg total) before bedtime.   Refills: 5     finasteride 5 MG Tabs  Commonly known as: Proscar      Take 1 tablet (5 mg total) by mouth in the morning.   Refills: 0     latanoprost 0.005 % Soln  Commonly known as: Xalatan      Place 1 drop into both eyes nightly   Quantity: 3 each  Refills: 3     potassium chloride 20 MEQ Tbcr  Commonly known as: Klor-Con M20      Take 1 tablet (20 mEq total) by mouth daily.   Quantity: 90 tablet  Refills: 3     Sotalol HCl 160 MG Tabs  Commonly known as: BETAPACE      Take 1 tablet (160 mg total) by mouth in the morning.   Refills: 0     tamsulosin 0.4 MG Caps  Commonly known as: Flomax      Take 1 capsule (0.4 mg total) by mouth in the morning.   Refills: 0              Follow up Visits  Mansi Logan APRN  [address] [place] [address]  [facility] [address]  [phone]    Go on 5/13/2025  You have an appointment scheduled for 5/13/25 at 3:30 pm    The correct provider is GEOVANNI Peacock MD    Consultants         Provider   Role Specialty     Isaac Boyer MD      Consulting Physician Cardiac Electrophysiology              Other Discharge Instructions:       Discharge Instructions         Going Home Instructions  In this section you will find the tools which will guide you through the first few days after you leave the hospital. Continued use of these tools will help you develop the skills necessary to keep your heart failure under control.     Home Care Instructions Following Heart  Failure - the most important things to do every day include:   Weigh yourself and review the “Self-Check Plan” sheet every morning.   Call your cardiologist office if you are in the “Pay Attention-Use Caution” (yellow zone) or “Medical Alert-Warning!” (red zone) as outlined in the Self-Check Plan sheet.  Take your medicines as prescribed.  Limit your sodium (salt) intake.  Know when to call your cardiologist, primary doctor, or nurse.  Know when to seek emergency care.      Things for You to Remember:   1. See your doctor or healthcare provider as written on your discharge instructions.  It is important that you attend this appointment to make sure your symptoms are under control.     2. Your recommended sodium intake is 2176-6487 mg daily.    3.  Weigh yourself every day.    4. Some exercise and activity is important to help keep your heart functioning and strong. Unless instructed not to exercise, you may walk at a slow to moderate pace for 10-15 minutes 2-3 days per week to start. Pace your activity to prevent shortness of breath or fatigue. Stop exercising if you develop chest pain, lightheadedness, or significant shortness of breath.       Call Your Cardiologist If:   You gain 2-3 pounds in one day or 5 pounds in one week.  You have more difficulty breathing.  You are getting more tired with normal activity.  You are more short of breath lying down, or awaken at night short of breath.  You have swelling of your feet or legs.  You urinate less often during the day and more often at night.  You have cramps in your legs.  You have blurred vision or see yellowish-green halos around objects of lights.    Go to the Emergency Room If:   You have pain or tightness in your chest  You are extremely short of breath  You are coughing up pink-frothy mucus  You are traveling and develop symptoms of worsening heart failure      ** Please follow up with your cardiologist or Advanced Practice Provider as written on your  discharge instructions. If you are not provided with an appointment, let your nurse know so you can get an appointment**          ----------------------------------------------------  35 MIN SPENT ON THIS DC   Julien Platt MD    5/2/2025

## 2025-05-02 NOTE — CM/SW NOTE
05/02/25 1100   Discharge disposition   Expected discharge disposition Home or Self   Outpatient services Outpatient rehab services   Discharge transportation Private car     Per chart, pt has DC order for today.    Message sent to Dr. Platt, ESTELLA Pastrana, and DC RN Marisa maguire MD enter order for Outpatient PT .      Pt is cleared from SW/CM stand point.      PLAN: Home w/ Outpatient PT           ELLIOTT Burgos, LINDSAYW [phone]

## 2025-05-02 NOTE — TRANSITION NOTE
Heart Failure Transition of Care    HPI:  85 year old male with a history of paroxysmal atrial fibrillation, pulm hypertension, heart block status post PPM, moderate coronary artery disease by CTA, CKD, hyperlipidemia presenting with shortness of breath.  Triage vitals pertinent for blood pressure 177/76, pulse 60, respiratory rate 28, SpO2 93% labs show creatinine 1.37 up from 1.2 last August, proBNP 2600, hemoglobin 8.7.  Patient was deemed to be volume overloaded, administered nebulizers and Lasix 40 mg IV push admitted for management of volume overload, HFpEF.     Date of Admission/ Weight/proBNP: 4/30/25/ 210#/ 2,693    Was this hospitalization a readmission for HF within the past 30 days? No      LVEF/ Date Assessed: 4/30/25 50-55%    Date of Discharge/ Weight/proBNP: 5/2/25/ 206#/ 2,396    Renal Function at Discharge: 3.0    Brief Summary of IV diuretic and/or inotrope/pressor use: IV lasix    Mechanical support required throughout hospital stay? no    Current Devices and/or Candidacy for Devices: no    Outpatient Labs/Diagnostics Needed: BMP 1 week    Heart Failure Follow-up appointment:    5/13/25 Mansi    HF Type: HFpEF  Stage: B  NYHA Class: II    GDMT on Discharge:  ACE/ARB/ARNI: no, HFpEF  Beta-Blocker: Yes , sotalol  Diuretic: lasix 40mg BID  MRA: no  SGLT-2 Inhibitor: no      Alexa Archuleta, MSN, APN, FNP-BC, CCK  05/02/25  [facility]  [phone] [place]  [phone] [place]

## 2025-05-02 NOTE — PLAN OF CARE
Patient alert. Denies SOB/pain. No edema noted to BLE. Ambulation encouraged. Per care team, patient ok to discharge home. IV access and tele removed. Belongings gathered and returned back to patient. Discharge nurse provided and reviewed discharge paperwork with patient and son. No concerns expressed at this time. Assisted to lobby via wheelchair.      Problem: CARDIOVASCULAR - ADULT  Goal: Maintains optimal cardiac output and hemodynamic stability  Description: INTERVENTIONS:- Monitor vital signs, rhythm, and trends- Monitor for bleeding, hypotension and signs of decreased cardiac output- Evaluate effectiveness of vasoactive medications to optimize hemodynamic stability- Monitor arterial and/or venous puncture sites for bleeding and/or hematoma- Assess quality of pulses, skin color and temperature- Assess for signs of decreased coronary artery perfusion - ex. Angina- Evaluate fluid balance, assess for edema, trend weights  Outcome: Progressing  Goal: Absence of cardiac arrhythmias or at baseline  Description: INTERVENTIONS:- Continuous cardiac monitoring, monitor vital signs, obtain 12 lead EKG if indicated- Evaluate effectiveness of antiarrhythmic and heart rate control medications as ordered- Initiate emergency measures for life threatening arrhythmias- Monitor electrolytes and administer replacement therapy as ordered  Outcome: Progressing     Problem: RESPIRATORY - ADULT  Goal: Achieves optimal ventilation and oxygenation  Description: INTERVENTIONS:- Assess for changes in respiratory status- Assess for changes in mentation and behavior- Position to facilitate oxygenation and minimize respiratory effort- Oxygen supplementation based on oxygen saturation or ABGs- Provide Smoking Cessation handout, if applicable- Encourage broncho-pulmonary hygiene including cough, deep breathe, Incentive Spirometry- Assess the need for suctioning and perform as needed- Assess and instruct to report SOB or any respiratory  difficulty- Respiratory Therapy support as indicated- Manage/alleviate anxiety- Monitor for signs/symptoms of CO2 retention  Outcome: Progressing     Problem: PAIN - ADULT  Goal: Verbalizes/displays adequate comfort level or patient's stated pain goal  Description: INTERVENTIONS:- Encourage pt to monitor pain and request assistance- Assess pain using appropriate pain scale- Administer analgesics based on type and severity of pain and evaluate response- Implement non-pharmacological measures as appropriate and evaluate response- Consider cultural and social influences on pain and pain management- Manage/alleviate anxiety- Utilize distraction and/or relaxation techniques- Monitor for opioid side effects- Notify MD/LIP if interventions unsuccessful or patient reports new pain- Anticipate increased pain with activity and pre-medicate as appropriate  Outcome: Progressing     Problem: SAFETY ADULT - FALL  Goal: Free from fall injury  Description: INTERVENTIONS:- Assess pt frequently for physical needs- Identify cognitive and physical deficits and behaviors that affect risk of falls.- Rebuck fall precautions as indicated by assessment.- Educate pt/family on patient safety including physical limitations- Instruct pt to call for assistance with activity based on assessment- Modify environment to reduce risk of injury- Provide assistive devices as appropriate- Consider OT/PT consult to assist with strengthening/mobility- Encourage toileting schedule  Outcome: Progressing     Problem: DISCHARGE PLANNING  Goal: Discharge to home or other facility with appropriate resources  Description: INTERVENTIONS:- Identify barriers to discharge w/pt and caregiver- Include patient/family/discharge partner in discharge planning- Arrange for needed discharge resources and transportation as appropriate- Identify discharge learning needs (meds, wound care, etc)- Arrange for interpreters to assist at discharge as needed- Consider  post-discharge preferences of patient/family/discharge partner- Complete POLST form as appropriate- Assess patient's ability to be responsible for managing their own health- Refer to Case Management Department for coordinating discharge planning if the patient needs post-hospital services based on physician/LIP order or complex needs related to functional status, cognitive ability or social support system  Outcome: Progressing

## 2025-05-05 ENCOUNTER — PATIENT OUTREACH (OUTPATIENT)
Dept: CASE MANAGEMENT | Age: 85
End: 2025-05-05

## 2025-05-05 ENCOUNTER — TELEPHONE (OUTPATIENT)
Dept: INTERNAL MEDICINE CLINIC | Facility: CLINIC | Age: 85
End: 2025-05-05

## 2025-05-05 NOTE — TELEPHONE ENCOUNTER
Spoke with patient for Transitions of Care call today.  Pt confirms 5/13/25 [facility] appt, as scheduled--will repeat BMP, prior to cardiology appt.     Offered TCM/SABINE appt with PCP/partners--pt declines, unless PCP requires TCM appt--prefers to f/u with [facility] only, at this time. Pt also prefers to wait until after [facility] appt, to schedule outpt PT.    TCM/SABINE appointment needed by 5/09/25.  Please advise.    BOOK BY DATE: 5/16/25    Please discuss with PCP  if TCM appt required, or ok to f/u with [facility], only, and follow-up with patient, accordingly. Thank you!           DISCHARGE DX: Principal Problem:    Heart failure, unspecified HF chronicity, unspecified heart failure type (HCC)  Active Problems:    Anemia, unspecified type    Acute hypoxemic respiratory failure (HCC)      Follow-up Appointments:    Follow-up Information    Follow up With Specialties Details Why Contact Info   Mansi Logan APRN Nurse Practitioner Go on 5/13/2025 You have an appointment scheduled for 5/13/25 at 3:30 pm    The correct provider is GEOVANNI Peacock [address]  [address]  [facility] [address]  [phone]   Gian Canela MD Internal Medicine Follow up  [address] [facility] [address]  [phone]     Discharge Orders  Basic Metabolic Panel (8)  Basic Metabolic Panel (8)    Physical Therapy Referral - [place] Locations    Your appointments       Date & Time Appointment Department ([place])    Sep 29, 2025 10:00 AM CDT Medicare Annual Well Visit with Gian Canela MD [facility] ([facility]              [address] [id number] [phone]

## 2025-05-05 NOTE — PROGRESS NOTES
Transitions of Care Navigation  Discharge Date: 25  Contact Date: 2025    Transitions of Care Assessment:  SABINE Initial Assessment    General:  Assessment completed with: Patient  Patient Subjective: Pt feeling better, since hospital discharge--following low sodium diet with FR, aware he needs to weigh daily--waiting for one of his sons to change battery in his home scale, independent with ADLs. Pt denies fever, chills, headache, vision changes, dizziness, nausea, vomiting, diarrhea, bleeding, irregular heartbeat or fast pulse, loss of vision, speech or strength or coordination in any body part, chest pain or shortness of breath at this time--speaking in full, clear sentences. Pt reports LE swelling improving dramatically, since resuming Furosemide 40 mg BID.  Chief Complaint: DISCHARGE DX: Principal Problem:    Heart failure, unspecified HF chronicity, unspecified heart failure type (HCC)  Active Problems:    Anemia, unspecified type    Acute hypoxemic respiratory failure (HCC)  Verify patient name and  with patient/ caregiver: Yes    Hospital Stay/Discharge:  Tell me what you understand of why you were in the hospital or emergency department: presenting complaining of shortness of breath. Symptoms starting around 1 day prior to admission. Patient notes that shortness of breath was worse with even minimal exertion. Patient states shortness of breath is worse when lying flat. Patient did note associated increased lower extremity swelling.  Prior to leaving the hospital were your Discharge Instructions reviewed with you?: Yes  Did you receive a copy of your written Discharge Instructions?: Yes  What questions do you have about your Discharge Instructions?: none  Do you feel better or worse since you left the hospital or emergency department?: Better    Follow - Up Appointment:  Do you have a follow-up appointment?: Yes  Date: 25  Physician: LEATHA Schaefer  Are there any barriers to getting to your  follow-up appointment?: No    Home Health/DME:  Prior to leaving the hospital was Home Health (HH) arranged for you?: No     Prior to leaving the hospital or emergency department was Durable Medical Equipment (DME), medical supplies, or infusions arranged for you?: No  Are DME/medical supply/infusions needs identified by staff during this assessment?: No     Medications/Diet:  Did any of your medications change, during or after your hospital stay or ED visit?: Yes  Do you have your new or updated medications?: Yes  Do you understand what your medications are for and possible side effects?: Yes  Are there any reasons that keep you from taking your medication as prescribed?: No  Any concerns about medication refills?: No    Were you given a different diet per your Discharge Instructions?: Yes  Diet Type: low sodium with FR  Reason: CHF  Are there any barriers to following that diet?: No     Questions/Concerns:  Do you have any questions or concerns that have not been discussed?: No     SABINE Follow-up Assessment    General:  Assessment completed with: Patient  Patient Subjective: Pt feeling better, since hospital discharge--following low sodium diet with FR, aware he needs to weigh daily--waiting for one of his sons to change battery in his home scale, independent with ADLs. Pt denies fever, chills, headache, vision changes, dizziness, nausea, vomiting, diarrhea, bleeding, irregular heartbeat or fast pulse, loss of vision, speech or strength or coordination in any body part, chest pain or shortness of breath at this time--speaking in full, clear sentences. Pt reports LE swelling improving dramatically, since resuming Furosemide 40 mg BID.  Chief Complaint: DISCHARGE DX: Principal Problem:    Heart failure, unspecified HF chronicity, unspecified heart failure type (HCC)  Active Problems:    Anemia, unspecified type    Acute hypoxemic respiratory failure (HCC)  Community Resources: Other (outpt PT--pt will schedule after  [facility] appt)    Progress/Care Plan:  Is the patient progressing as planned?: Yes  Frequency/Follow Up Plan: 7 day follow up, next week     Nursing Interventions: Discussed diet, activity, medications, home care and need for f/u visits. Pt confirms 5/13/25 [facility] appt, as scheduled--will repeat BMP, prior to cardiology appt. Pt declines TCM/SABINE appt, unless PCP requires TCM appt--prefers to f/u with [facility] only, at this time. Pt also prefers to wait until after [facility] appt, to schedule outpt PT. Sent TE to office staff as FYI/SABINE protocol.  Patient aware when to contact PCP/specialists and when to seek emergency care. No further questions/concerns at this time.    Medications:  Medication Reconciliation:  I am aware of an inpatient discharge within the last 30 days.  The discharge medication list has been reconciled with the patient's current medication list and reviewed by me. See medication list for additions of new medication, and changes to current doses of medications and discontinued medications.  Current Outpatient Medications   Medication Sig Dispense Refill    furosemide 40 MG Oral Tab Take 1 tablet (40 mg total) by mouth 2 (two) times daily.      ammonium lactate 12 % External Lotion Apply topically as needed for Dry Skin.      Sotalol HCl 160 MG Oral Tab Take 1 tablet (160 mg total) by mouth in the morning.      cholecalciferol 25 MCG (1000 UT) Oral Tab Take 1 tablet (1,000 Units total) by mouth in the morning.      atorvastatin 20 MG Oral Tab Take 1 tablet (20 mg total) by mouth daily. 90 tablet 1    potassium chloride 20 MEQ Oral Tab CR Take 1 tablet (20 mEq total) by mouth daily. 90 tablet 3    amLODIPine 5 MG Oral Tab Take 1 tablet (5 mg total) by mouth daily. 90 tablet 3    LATANOPROST 0.005 % Ophthalmic Solution Place 1 drop into both eyes nightly 3 each 3    ELIQUIS 5 MG Oral Tab Take 1 tablet (5 mg total) by mouth in the morning and 1 tablet (5 mg total) before bedtime.  5    finasteride 5 MG Oral Tab Take 1  tablet (5 mg total) by mouth in the morning.      tamsulosin (FLOMAX) cap Take 1 capsule (0.4 mg total) by mouth in the morning.     CHANGE how you take: furosemide (Lasix) Review your updated medication list below.     Basic Metabolic Panel (8) Complete by:  05/02/25 (Approximate) Basic Metabolic Panel (8) Complete by:  05/09/25 (Approximate)     Diagnosis specifics:  CHF: With your CHF diagnosis weighing yourself is very important.  How often are you weighing yourself? Not weighing yet  Is there any reason you are unable to weigh yourself daily?  Has scale--needs new batteries   What was your weight yesterday? Did not weigh   Today? Did not weigh  Were you told about any fluid restrictions? Yes  Have you noticed any shortness of breath or waking up short of breath? yes  Since discharge do you feel you are urinating more or less? more  Are you urinating more at night? yes  Do you notice any pain or swelling in your abdomen? no   Ankles or legs? no  Questions/Concerns: none    Things for You to Remember:   1. See your doctor or healthcare provider as written on your discharge instructions.  It is important that you attend this appointment to make sure your symptoms are under control.      2. Your recommended sodium intake is 7883-2998 mg daily.     3.  Weigh yourself every day.     4. Some exercise and activity is important to help keep your heart functioning and strong. Unless instructed not to exercise, you may walk at a slow to moderate pace for 10-15 minutes 2-3 days per week to start. Pace your activity to prevent shortness of breath or fatigue. Stop exercising if you develop chest pain, lightheadedness, or significant shortness of breath.         Call Your Cardiologist If:   You gain 2-3 pounds in one day or 5 pounds in one week.  You have more difficulty breathing.  You are getting more tired with normal activity.  You are more short of breath lying down, or awaken at night short of breath.  You have swelling  of your feet or legs.  You urinate less often during the day and more often at night.  You have cramps in your legs.  You have blurred vision or see yellowish-green halos around objects of lights.     Go to the Emergency Room If:   You have pain or tightness in your chest  You are extremely short of breath  You are coughing up pink-frothy mucus  You are traveling and develop symptoms of worsening heart failure  Did   Follow-up Appointments:    Follow-up Information    Follow up With Specialties Details Why Contact Info   Mansi Logan APRN Nurse Practitioner Go on 5/13/2025 You have an appointment scheduled for 5/13/25 at 3:30 pm    The correct provider is GEOVANNI Peacock [address] [facility]  [address]  [facility] [id number] [phone]   Gian Canela MD Internal Medicine Follow up  [address] [facility] [id number] [phone]     Your appointments       Date & Time Appointment Department ([place])    Sep 29, 2025 10:00 AM CDT Medicare Annual Well Visit with Gian Canela MD [facility] ([facility])              [address] [address] [phone]     Transitional [facility] Clinic  Was TCC Ordered: No    Primary Care Provider (If no TCC appointment)  Does patient already have a PCP appointment scheduled? No  Nurse Care Manager Attempted to schedule PCP office TCM/SABINE appointment with patient   -If no appointment scheduled: Explain message sent to office staff for PCP appt recommendations    Specialist  Does the patient have any other follow-up appointment(s) need to be scheduled? No     [x]  Patient verbally agrees to additional follow-up calls from Nurse Care Manager    Book By Date: 5/09/2025

## 2025-05-06 ENCOUNTER — TELEPHONE (OUTPATIENT)
Dept: CARDIOLOGY UNIT | Facility: HOSPITAL | Age: 85
End: 2025-05-06

## 2025-05-06 VITALS
DIASTOLIC BLOOD PRESSURE: 59 MMHG | HEART RATE: 64 BPM | RESPIRATION RATE: 18 BRPM | WEIGHT: 206.63 LBS | TEMPERATURE: 98 F | BODY MASS INDEX: 31 KG/M2 | OXYGEN SATURATION: 98 % | SYSTOLIC BLOOD PRESSURE: 112 MMHG

## 2025-05-06 NOTE — TELEPHONE ENCOUNTER
Future Appointments   Date Time Provider Department Center   5/14/2025 12:00 PM Gian Canela MD [facility]   9/29/2025 10:00 AM Gian Canela MD [facility]

## 2025-05-11 ENCOUNTER — LAB ENCOUNTER (OUTPATIENT)
Dept: LAB | Facility: HOSPITAL | Age: 85
End: 2025-05-11
Attending: INTERNAL MEDICINE
Payer: MEDICARE

## 2025-05-11 DIAGNOSIS — E78.5 HYPERLIPEMIA: Primary | ICD-10-CM

## 2025-05-11 DIAGNOSIS — I10 ESSENTIAL HYPERTENSION, MALIGNANT: ICD-10-CM

## 2025-05-11 LAB
BASOPHILS # BLD AUTO: 0.02 X10(3) UL (ref 0–0.2)
BASOPHILS NFR BLD AUTO: 0.5 %
CHOLEST SERPL-MCNC: 149 MG/DL (ref ?–200)
DEPRECATED RDW RBC AUTO: 49.5 FL (ref 35.1–46.3)
EOSINOPHIL # BLD AUTO: 0.15 X10(3) UL (ref 0–0.7)
EOSINOPHIL NFR BLD AUTO: 3.5 %
ERYTHROCYTE [DISTWIDTH] IN BLOOD BY AUTOMATED COUNT: 16.6 % (ref 11–15)
FASTING PATIENT LIPID ANSWER: YES
HCT VFR BLD AUTO: 30.6 % (ref 39–53)
HDLC SERPL-MCNC: 83 MG/DL (ref 40–59)
HGB BLD-MCNC: 9.3 G/DL (ref 13–17.5)
IMM GRANULOCYTES # BLD AUTO: 0.01 X10(3) UL (ref 0–1)
IMM GRANULOCYTES NFR BLD: 0.2 %
LDLC SERPL CALC-MCNC: 51 MG/DL (ref ?–100)
LYMPHOCYTES # BLD AUTO: 0.42 X10(3) UL (ref 1–4)
LYMPHOCYTES NFR BLD AUTO: 9.7 %
MCH RBC QN AUTO: 26 PG (ref 26–34)
MCHC RBC AUTO-ENTMCNC: 30.4 G/DL (ref 31–37)
MCV RBC AUTO: 85.5 FL (ref 80–100)
MONOCYTES # BLD AUTO: 0.72 X10(3) UL (ref 0.1–1)
MONOCYTES NFR BLD AUTO: 16.6 %
NEUTROPHILS # BLD AUTO: 3.01 X10 (3) UL (ref 1.5–7.7)
NEUTROPHILS # BLD AUTO: 3.01 X10(3) UL (ref 1.5–7.7)
NEUTROPHILS NFR BLD AUTO: 69.5 %
NONHDLC SERPL-MCNC: 66 MG/DL (ref ?–130)
PLATELET # BLD AUTO: 303 10(3)UL (ref 150–450)
RBC # BLD AUTO: 3.58 X10(6)UL (ref 3.8–5.8)
TRIGL SERPL-MCNC: 77 MG/DL (ref 30–149)
VLDLC SERPL CALC-MCNC: 11 MG/DL (ref 0–30)
WBC # BLD AUTO: 4.3 X10(3) UL (ref 4–11)

## 2025-05-11 PROCEDURE — 80061 LIPID PANEL: CPT

## 2025-05-11 PROCEDURE — 36415 COLL VENOUS BLD VENIPUNCTURE: CPT

## 2025-05-11 PROCEDURE — 85025 COMPLETE CBC W/AUTO DIFF WBC: CPT

## 2025-05-12 ENCOUNTER — TELEPHONE (OUTPATIENT)
Dept: INTERNAL MEDICINE CLINIC | Facility: CLINIC | Age: 85
End: 2025-05-12

## 2025-05-12 ENCOUNTER — PATIENT OUTREACH (OUTPATIENT)
Dept: CASE MANAGEMENT | Age: 85
End: 2025-05-12

## 2025-05-12 NOTE — TELEPHONE ENCOUNTER
COMPREHENSIVE MEDICATION REVIEW         Alberto Bray MRN [id number]    1940 PCP Gian Canela MD     Comments: Medication history completed by Ambulatory Clinic Pharmacist over the phone on 25. Patient has upcoming [facility] hospital follow up with PCP on 25.     After thorough medication review, no discrepancies have been identified and corrected on patient's medication list. See updated list below:     Outpatient Encounter Medications as of 2025   Medication Sig    furosemide 40 MG Oral Tab Take 1 tablet (40 mg total) by mouth 2 (two) times daily.    ammonium lactate 12 % External Lotion Apply topically as needed for Dry Skin.    Sotalol HCl 160 MG Oral Tab Take 1 tablet (160 mg total) by mouth in the morning.    cholecalciferol 25 MCG (1000 UT) Oral Tab Take 1 tablet (1,000 Units total) by mouth in the morning.    atorvastatin 20 MG Oral Tab Take 1 tablet (20 mg total) by mouth daily.    potassium chloride 20 MEQ Oral Tab CR Take 1 tablet (20 mEq total) by mouth daily.    amLODIPine 5 MG Oral Tab Take 1 tablet (5 mg total) by mouth daily.    LATANOPROST 0.005 % Ophthalmic Solution Place 1 drop into both eyes nightly    ELIQUIS 5 MG Oral Tab Take 1 tablet (5 mg total) by mouth in the morning and 1 tablet (5 mg total) before bedtime.    finasteride 5 MG Oral Tab Take 1 tablet (5 mg total) by mouth in the morning.    tamsulosin (FLOMAX) cap Take 1 capsule (0.4 mg total) by mouth in the morning.     Medication Assessment:   Reviewed all medications in detail with patient including dose, indication, timing of administration, monitoring parameters, and potential side effects of medications.     Patient reports the only medication change that was made at hospital discharge was his furosemide was increased to furosemide 40 mg twice daily. He has been taking the increased dose as prescribed and does report his ankles are less swollen. Patient confirms he is still taking amlodipine 5 mg daily,  atorvastatin 20 mg daily, Eliquis 5 mg twice daily and sotalol 160 mg daily as prescribed. He has NOT checked his blood pressure at home since hospital discharged. Did recommend he monitor his blood pressure 2-3 times weekly and bring readings to all MD appointments for review.     Did provide education and stressed the importance of taking medication just like prescribed to get the most benefit. Patient denies forgetting or missing medication doses and denies any questions or concerns with medications at this time.     Thank you,    Deepika Rodriguez, PharmD, 5/12/2025, 1:46 PM

## 2025-05-12 NOTE — PROGRESS NOTES
SABINE Follow-up Assessment    General:  Assessment completed with: Patient  Chief Complaint: Chief Complaint: DISCHARGE DX: Principal Problem:    Heart failure, unspecified HF chronicity, unspecified heart failure type (HCC)  Active Problems:    Anemia, unspecified type    Acute hypoxemic respiratory failure (HCC)    Progress/Care Plan:  Is the patient progressing as planned?: Yes  Care Plan Update: \"I'm doing just fine--I changed the batteries in my scale and I'm weighing, every day. I am at a constant 188 pounds.\"  New Care Plan: Pt confirms 5/13/25 [facility] appt, as scheduled--will repeat BMP, prior to cardiology appt and TCM appt 5/14/25 with PCP.  Frequency/Follow Up Plan: 14 day follow up, next week     Notes:  Navigator Notes: Pt continues to improve, since hospital discharge--following low sodium diet with FR, independent with ADLs. Pt denies fever, chills, headache, vision changes, dizziness, nausea, vomiting, diarrhea, bleeding, irregular heartbeat or fast pulse, loss of vision, speech or strength or coordination in any body part, calf pain or swelling, worsening bilateral LE swelling, chest pain or shortness of breath at this time--speaking in full, clear sentences.       Future Appointments   Date Time Provider Department Center   5/14/2025 12:00 PM Gian Canela MD [facility]   9/29/2025 10:00 AM Gian Canela MD [facility]

## 2025-05-14 ENCOUNTER — OFFICE VISIT (OUTPATIENT)
Dept: INTERNAL MEDICINE CLINIC | Facility: CLINIC | Age: 85
End: 2025-05-14
Payer: MEDICARE

## 2025-05-14 VITALS
WEIGHT: 197 LBS | DIASTOLIC BLOOD PRESSURE: 62 MMHG | OXYGEN SATURATION: 93 % | BODY MASS INDEX: 29.18 KG/M2 | TEMPERATURE: 97 F | HEART RATE: 83 BPM | HEIGHT: 69 IN | SYSTOLIC BLOOD PRESSURE: 116 MMHG

## 2025-05-14 DIAGNOSIS — N18.30 STAGE 3 CHRONIC KIDNEY DISEASE, UNSPECIFIED WHETHER STAGE 3A OR 3B CKD (HCC): ICD-10-CM

## 2025-05-14 DIAGNOSIS — I50.31 ACUTE DIASTOLIC HEART FAILURE (HCC): Primary | ICD-10-CM

## 2025-05-14 DIAGNOSIS — D64.9 ANEMIA, UNSPECIFIED TYPE: ICD-10-CM

## 2025-05-14 PROCEDURE — 99495 TRANSJ CARE MGMT MOD F2F 14D: CPT | Performed by: INTERNAL MEDICINE

## 2025-05-14 NOTE — PROGRESS NOTES
Subjective:   Alberto Bray is a 85 year old male who presents for hospital follow up.   He was discharged from [facility] to Home or Self Care  Admission Date: 4/30/25   Discharge Date: 5/2/25  Hospital Discharge Diagnosis: Acute respiratory failure    Interactive contact within 2 business days post discharge first initiated on Date: 5/12/2025    During the visit, the following was completed:  Obtained and reviewed discharge summary, continuity of care documents, and Hospitalist notes  Reviewed Labs (CBC, CMP)    HPI:   Dc summary copied  \"This is a 85 year oldmale patient presenting complaining of shortness of breath.  Symptoms starting around 1 day prior to admission.  Patient notes that shortness of breath was worse with even minimal exertion.  Patient states shortness of breath is worse when lying flat.  Patient did note associated increased lower extremity swelling.  Upon further review, patient reports going on vacation this past weekend and having increased liquid intake.  Patient denies current cough with sputum production.  Patient denies subjective fevers or chills.  Patient otherwise denies current chest pain, abdominal pain, nausea or vomiting. \"     Hospital Course:       Acute on chronic heart failure with preserved ejection fraction   Acute respiratory failure with hypoxia  -Improving  -Patient presented with shortness of breath.  Found to be hypoxic requiring supplemental O2. Weaned to RA  -Chest x-ray reviewed.  Noted bilateral interstitial opacities consistent with interstitial edema  - Last known EF 63%  - Improved with diuresis with Lasix 40mg IV BID, transitioned to PO  - Echo reviewed  - Daily weights, Fluid restriction  - Cardiology on consult, f/u as outpt     Paroxysmal Atrial Fibrillation  -Rates currently controlled  -Continue sotalol  -Continue chronic anticoagulation with Eliquis  -Cardiology on consult,  f/u as outpt     HTN  - controlled  - CPM  - PCP to monitor and adjust as  needed     Hx of Heart block   -s/p PPM      CKD  III   - Close monitoring with diuresis as above  - Avoiding Nephrotoxic agents  - Cont to monitor     BPH  - Continue tamsulosin and finasteride     Hyperlipidemia  -Statin      he is here for follow-up.  He reports that he is doing better since discharge.  Taking diuretic as prescribed.  Weight is stable.  Breathing got better.  Denies any orthopnea.  He has been taking his medications regularly.    History/Other:   Current Medications:  Medication Reconciliation:  I am aware of an inpatient discharge within the last 30 days.  The discharge medication list has been reconciled with the patient's current medication list and reviewed by me. See medication list for additions of new medication, and changes to current doses of medications and discontinued medications.  Active Meds, Sig Only[1]    Review of Systems   Constitutional:  Negative for activity change, appetite change and fever.   HENT:  Negative for congestion and voice change.    Respiratory:  Negative for cough and shortness of breath.    Cardiovascular:  Negative for chest pain.   Gastrointestinal:  Negative for abdominal distention, abdominal pain and vomiting.   Genitourinary:  Negative for hematuria.   Skin:  Negative for wound.   Psychiatric/Behavioral:  Negative for behavioral problems.          Objective:   Physical Exam  Constitutional:       Appearance: Normal appearance.   HENT:      Head: Normocephalic.   Eyes:      Conjunctiva/sclera: Conjunctivae normal.   Cardiovascular:      Rate and Rhythm: Normal rate and regular rhythm.      Heart sounds: Normal heart sounds. No murmur heard.  Pulmonary:      Effort: Pulmonary effort is normal.      Breath sounds: Normal breath sounds. No rhonchi or rales.   Abdominal:      General: Bowel sounds are normal.      Palpations: Abdomen is soft.      Tenderness: There is no abdominal tenderness.   Musculoskeletal:      Cervical back: Neck supple.   Skin:      General: Skin is warm and dry.   Neurological:      General: No focal deficit present.      Mental Status: He is alert and oriented to person, place, and time. Mental status is at baseline.   Psychiatric:         Mood and Affect: Mood normal.         Behavior: Behavior normal.     Edema present bilaterally.    /62   Pulse 83   Temp 97.1 °F (36.2 °C) (Temporal)   Ht 5' 9\" (1.753 m)   Wt 197 lb (89.4 kg)   SpO2 93%   BMI 29.09 kg/m²  Estimated body mass index is 29.09 kg/m² as calculated from the following:    Height as of this encounter: 5' 9\" (1.753 m).    Weight as of this encounter: 197 lb (89.4 kg).    Assessment & Plan:   1. Acute diastolic heart failure (HCC) (Primary)  2. Anemia, unspecified type  3. Stage 3 chronic kidney disease, unspecified whether stage 3a or 3b CKD (HCC)  Plan  Heart failure exacerbation has been resolved.  Continue with the current dose of diuretics.  Encourage patient to check weight.  Continue to follow-up with cardiology.  He is taking iron supplements.  No bleeding reported.  Hemoglobin is improving.  Kidney function is stable.  He already has orders for basic metabolic panel.  I have encouraged him to complete it.      No follow-ups on file.          [1]   Outpatient Medications Marked as Taking for the 5/14/25 encounter (Office Visit) with Gian Canela MD   Medication Sig    furosemide 40 MG Oral Tab Take 1 tablet (40 mg total) by mouth 2 (two) times daily.    ammonium lactate 12 % External Lotion Apply topically as needed for Dry Skin.    Sotalol HCl 160 MG Oral Tab Take 1 tablet (160 mg total) by mouth in the morning.    cholecalciferol 25 MCG (1000 UT) Oral Tab Take 1 tablet (1,000 Units total) by mouth in the morning.    atorvastatin 20 MG Oral Tab Take 1 tablet (20 mg total) by mouth daily.    potassium chloride 20 MEQ Oral Tab CR Take 1 tablet (20 mEq total) by mouth daily.    amLODIPine 5 MG Oral Tab Take 1 tablet (5 mg total) by mouth daily.    LATANOPROST  0.005 % Ophthalmic Solution Place 1 drop into both eyes nightly    ELIQUIS 5 MG Oral Tab Take 1 tablet (5 mg total) by mouth in the morning and 1 tablet (5 mg total) before bedtime.    finasteride 5 MG Oral Tab Take 1 tablet (5 mg total) by mouth in the morning.    tamsulosin (FLOMAX) cap Take 1 capsule (0.4 mg total) by mouth in the morning.

## 2025-05-18 ENCOUNTER — LAB ENCOUNTER (OUTPATIENT)
Dept: LAB | Facility: HOSPITAL | Age: 85
End: 2025-05-18
Attending: NURSE PRACTITIONER
Payer: MEDICARE

## 2025-05-18 DIAGNOSIS — I50.9 HEART FAILURE, UNSPECIFIED HF CHRONICITY, UNSPECIFIED HEART FAILURE TYPE (HCC): ICD-10-CM

## 2025-05-18 LAB
ANION GAP SERPL CALC-SCNC: 9 MMOL/L (ref 0–18)
BUN BLD-MCNC: 38 MG/DL (ref 9–23)
BUN/CREAT SERPL: 24.2 (ref 10–20)
CALCIUM BLD-MCNC: 9 MG/DL (ref 8.7–10.4)
CHLORIDE SERPL-SCNC: 98 MMOL/L (ref 98–112)
CO2 SERPL-SCNC: 32 MMOL/L (ref 21–32)
CREAT BLD-MCNC: 1.57 MG/DL (ref 0.7–1.3)
EGFRCR SERPLBLD CKD-EPI 2021: 43 ML/MIN/1.73M2 (ref 60–?)
GLUCOSE BLD-MCNC: 96 MG/DL (ref 70–99)
OSMOLALITY SERPL CALC.SUM OF ELEC: 297 MOSM/KG (ref 275–295)
POTASSIUM SERPL-SCNC: 4.9 MMOL/L (ref 3.5–5.1)
SODIUM SERPL-SCNC: 139 MMOL/L (ref 136–145)

## 2025-05-18 PROCEDURE — 36415 COLL VENOUS BLD VENIPUNCTURE: CPT

## 2025-05-18 PROCEDURE — 80048 BASIC METABOLIC PNL TOTAL CA: CPT

## 2025-05-19 ENCOUNTER — PATIENT OUTREACH (OUTPATIENT)
Dept: CASE MANAGEMENT | Age: 85
End: 2025-05-19

## 2025-05-19 NOTE — PROGRESS NOTES
SABINE Follow-up Assessment    General:  Assessment completed with: Patient  Chief Complaint: DISCHARGE DX: Principal Problem:    Heart failure, unspecified HF chronicity, unspecified heart failure type (HCC)  Active Problems:    Anemia, unspecified type    Acute hypoxemic respiratory failure (HCC)    Progress/Care Plan:  Is the patient progressing as planned?: Yes  Care Plan Update: \"I'm getting along,fine. I am at a constant 188 pounds.\" Pt did see PCP 5/14/25, as scheduled. Pt confirms he was seen 5/13/25 for [facility] appt--pt did repeat BMP, yesterday, as ordered by ANAHI ZHANG.  New Care Plan: Pt will continue to monitor symptoms and will f/u with [facility] RE: repeat BMP results and recommendations.  Frequency/Follow Up Plan: 21 day fiollow up, next week     Notes:  Navigator Notes: Pt continues to improve, since hospital discharge--following low sodium diet with FR, independent with ADLs. Pt denies fever, chills, headache, vision changes, dizziness, nausea, vomiting, diarrhea, bleeding, irregular heartbeat or fast pulse, loss of vision, speech or strength or coordination in any body part, calf pain or swelling, worsening bilateral LE swelling, abdominal distension or pain, low back or flank pain, chest pain or shortness of breath at this time--speaking in full, clear sentences.  Patient aware when to contact PCP/specialists and when to seek emergency care. No further questions/concerns at this time.         Future Appointments   Date Time Provider Department Center   9/29/2025 10:00 AM Gian Canela MD [facility]

## 2025-05-20 ENCOUNTER — PATIENT MESSAGE (OUTPATIENT)
Dept: INTERNAL MEDICINE CLINIC | Facility: CLINIC | Age: 85
End: 2025-05-20

## 2025-05-21 NOTE — TELEPHONE ENCOUNTER
Discussed with the patient.  Kidney function has been relatively stable for the last 8 years.  Decided to monitor.

## 2025-05-27 ENCOUNTER — PATIENT OUTREACH (OUTPATIENT)
Dept: CASE MANAGEMENT | Age: 85
End: 2025-05-27

## 2025-05-29 ENCOUNTER — MED REC SCAN ONLY (OUTPATIENT)
Dept: INTERNAL MEDICINE CLINIC | Facility: CLINIC | Age: 85
End: 2025-05-29

## 2025-06-03 ENCOUNTER — PATIENT OUTREACH (OUTPATIENT)
Dept: CASE MANAGEMENT | Age: 85
End: 2025-06-03

## 2025-06-03 NOTE — PROGRESS NOTES
SABINE Graduation Assessment    General:  Assessment completed with: Patient    Care Plan/Instructions:   Care Plan Summary (Recap of navigation period including # of ED & Hospital Admission, and if goals met or unmet): \"Everything is still fine--I'm back down to 188 lbs, this morning.\" Pt confirms he did have outpt left cataract surgery 5/28/25, without issue. Pt denies fever, chills, headache, vision changes, dizziness, nausea, vomiting, diarrhea, bleeding, irregular heartbeat or fast pulse, loss of vision, speech or strength or coordination in any body part, calf pain or swelling, worsening bilateral LE swelling, abdominal distension or pain, low back or flank pain, chest pain or shortness of breath at this time--speaking in full, clear sentences. Patient has met goals, no further outreach needed.  Patient Graduation Instructions (Ongoing barriers to care identified, Areas of Need, Areas of Progress): Pt will continue to monitor symptoms, continue with daily weights and following low sodium diet with FR, independent with ADLs. Pt confirms [facility] Well Visit 9/29/25, as scheduled. Patient aware when to contact PCP/specialists and when to seek emergency care. No further questions/concerns at this time.     Care Management/Programs:  Does the patient require further care management?: No      Future Appointments   Date Time Provider Department Center   9/29/2025 10:00 AM Gian Canela MD [facility]

## 2025-08-11 ENCOUNTER — LAB ENCOUNTER (OUTPATIENT)
Dept: LAB | Facility: HOSPITAL | Age: 85
End: 2025-08-11
Attending: STUDENT IN AN ORGANIZED HEALTH CARE EDUCATION/TRAINING PROGRAM

## 2025-08-11 DIAGNOSIS — R06.02 SHORTNESS OF BREATH: ICD-10-CM

## 2025-08-11 DIAGNOSIS — I10 HYPERTENSION, ESSENTIAL: Primary | ICD-10-CM

## 2025-08-11 LAB
ALBUMIN SERPL-MCNC: 4.1 G/DL (ref 3.2–4.8)
ALBUMIN/GLOB SERPL: 1.7 (ref 1–2)
ALP LIVER SERPL-CCNC: 316 U/L (ref 45–117)
ALT SERPL-CCNC: 30 U/L (ref 10–49)
ANION GAP SERPL CALC-SCNC: 7 MMOL/L (ref 0–18)
AST SERPL-CCNC: 56 U/L (ref ?–34)
BILIRUB SERPL-MCNC: 0.6 MG/DL (ref 0.2–1.1)
BUN BLD-MCNC: 33 MG/DL (ref 9–23)
BUN/CREAT SERPL: 22.6 (ref 10–20)
CALCIUM BLD-MCNC: 9.8 MG/DL (ref 8.7–10.4)
CHLORIDE SERPL-SCNC: 102 MMOL/L (ref 98–112)
CO2 SERPL-SCNC: 31 MMOL/L (ref 21–32)
CREAT BLD-MCNC: 1.46 MG/DL (ref 0.7–1.3)
EGFRCR SERPLBLD CKD-EPI 2021: 47 ML/MIN/1.73M2 (ref 60–?)
FASTING STATUS PATIENT QL REPORTED: YES
GLOBULIN PLAS-MCNC: 2.4 G/DL (ref 2–3.5)
GLUCOSE BLD-MCNC: 95 MG/DL (ref 70–99)
NT-PROBNP SERPL-MCNC: 1849 PG/ML (ref ?–450)
OSMOLALITY SERPL CALC.SUM OF ELEC: 297 MOSM/KG (ref 275–295)
POTASSIUM SERPL-SCNC: 4.5 MMOL/L (ref 3.5–5.1)
PROT SERPL-MCNC: 6.5 G/DL (ref 5.7–8.2)
SODIUM SERPL-SCNC: 140 MMOL/L (ref 136–145)

## 2025-08-11 PROCEDURE — 36415 COLL VENOUS BLD VENIPUNCTURE: CPT

## 2025-08-11 PROCEDURE — 80053 COMPREHEN METABOLIC PANEL: CPT

## 2025-08-11 PROCEDURE — 83880 ASSAY OF NATRIURETIC PEPTIDE: CPT

## 2025-08-21 ENCOUNTER — PATIENT MESSAGE (OUTPATIENT)
Dept: INTERNAL MEDICINE CLINIC | Facility: CLINIC | Age: 85
End: 2025-08-21

## 2025-08-21 DIAGNOSIS — R74.8 ELEVATED ALKALINE PHOSPHATASE LEVEL: Primary | ICD-10-CM

## 2025-08-21 DIAGNOSIS — Z12.5 SCREENING PSA (PROSTATE SPECIFIC ANTIGEN): ICD-10-CM

## 2025-08-26 ENCOUNTER — LAB ENCOUNTER (OUTPATIENT)
Dept: LAB | Facility: HOSPITAL | Age: 85
End: 2025-08-26
Attending: INTERNAL MEDICINE

## 2025-08-26 DIAGNOSIS — R74.8 ELEVATED ALKALINE PHOSPHATASE LEVEL: ICD-10-CM

## 2025-08-26 DIAGNOSIS — Z12.5 SCREENING PSA (PROSTATE SPECIFIC ANTIGEN): ICD-10-CM

## 2025-08-26 LAB
COMPLEXED PSA SERPL-MCNC: 1.78 NG/ML (ref ?–4)
GGT SERPL-CCNC: 360 U/L (ref ?–73)

## 2025-08-26 PROCEDURE — 36415 COLL VENOUS BLD VENIPUNCTURE: CPT

## 2025-08-26 PROCEDURE — 82977 ASSAY OF GGT: CPT

## 2025-08-28 RX ORDER — AMLODIPINE BESYLATE 5 MG/1
5 TABLET ORAL DAILY
Qty: 90 TABLET | Refills: 0 | Status: SHIPPED | OUTPATIENT
Start: 2025-08-28

## (undated) NOTE — Clinical Note
Patient Graduated - Patient completed the SABINE Navigation Program---no readmission in 30 days. Patient has met goals, no further outreach needed. Thank you!  Future Appointments 9/29/2025  10:00 AM   Gian Canela MD [facility]

## (undated) NOTE — LETTER
[facility]  Administration of Anesthesia  1. Luly Mercado, or _________________________________ acting on his behalf, (Patient) (Dependent/Representative) request to receive anesthesia for my pending procedure/operation/treatment.   A bleeding, seizure, cardiac arrest and death. 7. AWARENESS: I understand that it is possible (but unlikely) to have explicit memory of events from the operating room while under general anesthesia.   8. ELECTROCONVULSIVE THERAPY PATIENTS: This consent serve below affirms that prior to the time of the procedure, I have explained to the patient and/or his/her guardian, the risks and benefits of undergoing anesthesia, as well as any reasonable alternatives.     ___________________________________________________

## (undated) NOTE — LETTER
12/13/2018    Patient: Logan Campoverde   MR Number: [id number]   YOB: 1940   Date of Visit: 12/13/2018   Physician: Rhonda Qureshi MD     Dear Medicare Patient:  Sebastian Meier TO BENEFICIARY:  Please know that while a refraction is

## (undated) NOTE — Clinical Note
Initial assessment completed with patient. Pt confirms 5/13/25 [facility] appt, as scheduled--will repeat BMP, prior to cardiology appt. Pt declines TCM/SABINE appt, unless you require TCM appt--prefers to f/u with [facility] only, at this time. Pt also prefers to wait until after [facility] appt, to schedule outpt PT. Sent TE to office staff as FYI/SABINE protocol RE: appt recommendations. Patient agrees to additional follow-up calls from nurse care manager.  Thank you!  Future Appointments 9/29/2025  10:00 AM   Gian Canela MD [facility]

## (undated) NOTE — LETTER
[facility]  Administration of Anesthesia  1. Palma Cline, or _________________________________ acting on his behalf, (Patient) (Dependent/Representative) request to receive anesthesia for my pending procedure/operation/treatment.   A bleeding, seizure, cardiac arrest and death. 7. AWARENESS: I understand that it is possible (but unlikely) to have explicit memory of events from the operating room while under general anesthesia.   8. ELECTROCONVULSIVE THERAPY PATIENTS: This consent serve below affirms that prior to the time of the procedure, I have explained to the patient and/or his/her guardian, the risks and benefits of undergoing anesthesia, as well as any reasonable alternatives.     ___________________________________________________

## (undated) NOTE — LETTER
12/6/2017              Pavithra Tidwell        [address] [place] [address]         Dear Danya Caballero,    This medical office is an off-site department of [facility].  Since this is an offsite [address] [place] [address] Discission of secondary Cataract with Laser $70.92  68448 Correct Trichiasis, Epilation.  Forceps only  $12.50  [id number] Excision of Lesion, eyelid without closure or with simple    direct closure $36.84  29725 Closure of lacrimal punctum by plug, each e